# Patient Record
Sex: FEMALE | Race: WHITE | Employment: OTHER | URBAN - NONMETROPOLITAN AREA
[De-identification: names, ages, dates, MRNs, and addresses within clinical notes are randomized per-mention and may not be internally consistent; named-entity substitution may affect disease eponyms.]

---

## 2018-09-18 LAB
CHOLESTEROL, TOTAL: 156 MG/DL
CHOLESTEROL/HDL RATIO: 3.5
HDLC SERPL-MCNC: 44 MG/DL (ref 35–70)
LDL CHOLESTEROL CALCULATED: 89 MG/DL (ref 0–160)
TRIGL SERPL-MCNC: 133 MG/DL
VLDLC SERPL CALC-MCNC: 112 MG/DL

## 2019-05-29 NOTE — TELEPHONE ENCOUNTER
Pt called and stated she needed a refill on her insulin needles and insulin. She is currently out of both. She uses the [facility] in [place]. Please send these scripts to the Pharmacy for her and then call her when you have done so. Thank You.

## 2019-06-05 ENCOUNTER — TELEPHONE (OUTPATIENT)
Dept: PRIMARY CARE CLINIC | Age: 84
End: 2019-06-05

## 2019-06-05 RX ORDER — EZETIMIBE 10 MG/1
10 TABLET ORAL DAILY
COMMUNITY
End: 2019-10-15 | Stop reason: SDUPTHER

## 2019-06-05 RX ORDER — GLIMEPIRIDE 1 MG/1
1 TABLET ORAL 2 TIMES DAILY
COMMUNITY
End: 2019-11-19 | Stop reason: SDUPTHER

## 2019-06-05 RX ORDER — ESTRADIOL 0.1 MG/G
2 CREAM VAGINAL
COMMUNITY
End: 2019-06-11

## 2019-06-05 RX ORDER — AMLODIPINE BESYLATE 10 MG/1
10 TABLET ORAL DAILY
COMMUNITY
End: 2019-10-15 | Stop reason: SDUPTHER

## 2019-06-05 RX ORDER — BUSPIRONE HYDROCHLORIDE 5 MG/1
5 TABLET ORAL 3 TIMES DAILY
COMMUNITY
End: 2019-11-19

## 2019-06-05 RX ORDER — LANSOPRAZOLE 30 MG/1
30 CAPSULE, DELAYED RELEASE ORAL DAILY
COMMUNITY
End: 2019-11-19 | Stop reason: SDUPTHER

## 2019-06-05 RX ORDER — CLONIDINE HYDROCHLORIDE 0.1 MG/1
0.1 TABLET ORAL 2 TIMES DAILY
COMMUNITY
End: 2019-11-19

## 2019-06-05 RX ORDER — HYDROCODONE BITARTRATE AND ACETAMINOPHEN 5; 325 MG/1; MG/1
1 TABLET ORAL EVERY 6 HOURS PRN
COMMUNITY
End: 2019-06-11

## 2019-06-05 RX ORDER — SERTRALINE HYDROCHLORIDE 100 MG/1
100 TABLET, FILM COATED ORAL DAILY
COMMUNITY
End: 2019-11-19

## 2019-06-05 RX ORDER — LUBIPROSTONE 8 UG/1
8 CAPSULE, GELATIN COATED ORAL DAILY
COMMUNITY
End: 2019-06-11

## 2019-06-11 ENCOUNTER — OFFICE VISIT (OUTPATIENT)
Dept: PRIMARY CARE CLINIC | Age: 84
End: 2019-06-11
Payer: MEDICARE

## 2019-06-11 ENCOUNTER — HOSPITAL ENCOUNTER (OUTPATIENT)
Age: 84
Discharge: HOME | End: 2019-06-13
Payer: MEDICARE

## 2019-06-11 ENCOUNTER — TELEPHONE (OUTPATIENT)
Dept: PRIMARY CARE CLINIC | Age: 84
End: 2019-06-11

## 2019-06-11 VITALS
DIASTOLIC BLOOD PRESSURE: 78 MMHG | RESPIRATION RATE: 18 BRPM | BODY MASS INDEX: 33.04 KG/M2 | TEMPERATURE: 98.2 F | SYSTOLIC BLOOD PRESSURE: 132 MMHG | HEIGHT: 61 IN | OXYGEN SATURATION: 98 % | WEIGHT: 175 LBS | HEART RATE: 94 BPM

## 2019-06-11 DIAGNOSIS — D51.0 PERNICIOUS ANEMIA: ICD-10-CM

## 2019-06-11 DIAGNOSIS — E78.2 MIXED HYPERLIPIDEMIA: ICD-10-CM

## 2019-06-11 DIAGNOSIS — D51.8 OTHER VITAMIN B12 DEFICIENCY ANEMIA: ICD-10-CM

## 2019-06-11 DIAGNOSIS — M79.7 FIBROMYALGIA: ICD-10-CM

## 2019-06-11 DIAGNOSIS — G89.4 CHRONIC PAIN SYNDROME: ICD-10-CM

## 2019-06-11 DIAGNOSIS — I10 ESSENTIAL HYPERTENSION: ICD-10-CM

## 2019-06-11 DIAGNOSIS — K31.84 GASTROPARESIS: ICD-10-CM

## 2019-06-11 DIAGNOSIS — E04.1 THYROID NODULE: ICD-10-CM

## 2019-06-11 DIAGNOSIS — I10 ESSENTIAL HYPERTENSION: Primary | ICD-10-CM

## 2019-06-11 DIAGNOSIS — E11.65 TYPE 2 DIABETES MELLITUS WITH HYPERGLYCEMIA, WITHOUT LONG-TERM CURRENT USE OF INSULIN (HCC): ICD-10-CM

## 2019-06-11 DIAGNOSIS — R19.7 DIARRHEA, UNSPECIFIED TYPE: ICD-10-CM

## 2019-06-11 PROBLEM — G89.29 CHRONIC PAIN: Status: ACTIVE | Noted: 2019-06-11

## 2019-06-11 PROBLEM — E11.9 TYPE 2 DIABETES MELLITUS (HCC): Status: ACTIVE | Noted: 2017-10-09

## 2019-06-11 LAB
ALBUMIN SERPL-MCNC: 4.7 G/DL (ref 3.5–5.2)
ALP BLD-CCNC: 164 U/L (ref 35–104)
ALT SERPL-CCNC: 55 U/L (ref 0–32)
ANION GAP SERPL CALCULATED.3IONS-SCNC: 17 MMOL/L (ref 7–16)
AST SERPL-CCNC: 67 U/L (ref 0–31)
BASOPHILS ABSOLUTE: 0.05 E9/L (ref 0–0.2)
BASOPHILS RELATIVE PERCENT: 0.5 % (ref 0–2)
BILIRUB SERPL-MCNC: 0.3 MG/DL (ref 0–1.2)
BUN BLDV-MCNC: 28 MG/DL (ref 8–23)
CALCIUM SERPL-MCNC: 10.2 MG/DL (ref 8.6–10.2)
CHLORIDE BLD-SCNC: 105 MMOL/L (ref 98–107)
CHOLESTEROL, TOTAL: 183 MG/DL (ref 0–199)
CO2: 21 MMOL/L (ref 22–29)
CREAT SERPL-MCNC: 1.1 MG/DL (ref 0.5–1)
CREATININE URINE: 122 MG/DL (ref 29–226)
EOSINOPHILS ABSOLUTE: 0.28 E9/L (ref 0.05–0.5)
EOSINOPHILS RELATIVE PERCENT: 2.5 % (ref 0–6)
GFR AFRICAN AMERICAN: 57
GFR NON-AFRICAN AMERICAN: 47 ML/MIN/1.73
GLUCOSE BLD-MCNC: 125 MG/DL (ref 74–99)
HBA1C MFR BLD: 7.7 % (ref 4–5.6)
HCT VFR BLD CALC: 42.7 % (ref 34–48)
HDLC SERPL-MCNC: 60 MG/DL
HEMOGLOBIN: 13 G/DL (ref 11.5–15.5)
IMMATURE GRANULOCYTES #: 0.07 E9/L
IMMATURE GRANULOCYTES %: 0.6 % (ref 0–5)
LDL CHOLESTEROL CALCULATED: 96 MG/DL (ref 0–99)
LYMPHOCYTES ABSOLUTE: 1.86 E9/L (ref 1.5–4)
LYMPHOCYTES RELATIVE PERCENT: 16.8 % (ref 20–42)
MCH RBC QN AUTO: 24.9 PG (ref 26–35)
MCHC RBC AUTO-ENTMCNC: 30.4 % (ref 32–34.5)
MCV RBC AUTO: 81.6 FL (ref 80–99.9)
MICROALBUMIN UR-MCNC: 112.7 MG/L
MICROALBUMIN/CREAT UR-RTO: 92.4 (ref 0–30)
MONOCYTES ABSOLUTE: 0.65 E9/L (ref 0.1–0.95)
MONOCYTES RELATIVE PERCENT: 5.9 % (ref 2–12)
NEUTROPHILS ABSOLUTE: 8.19 E9/L (ref 1.8–7.3)
NEUTROPHILS RELATIVE PERCENT: 73.7 % (ref 43–80)
PDW BLD-RTO: 14.4 FL (ref 11.5–15)
PLATELET # BLD: 304 E9/L (ref 130–450)
PMV BLD AUTO: 11.8 FL (ref 7–12)
POTASSIUM SERPL-SCNC: 4.3 MMOL/L (ref 3.5–5)
RBC # BLD: 5.23 E12/L (ref 3.5–5.5)
SODIUM BLD-SCNC: 143 MMOL/L (ref 132–146)
T4 FREE: 1.05 NG/DL (ref 0.93–1.7)
TOTAL PROTEIN: 8.1 G/DL (ref 6.4–8.3)
TRIGL SERPL-MCNC: 137 MG/DL (ref 0–149)
TSH SERPL DL<=0.05 MIU/L-ACNC: 1.98 UIU/ML (ref 0.27–4.2)
VITAMIN B-12: >2000 PG/ML (ref 211–946)
VLDLC SERPL CALC-MCNC: 27 MG/DL
WBC # BLD: 11.1 E9/L (ref 4.5–11.5)

## 2019-06-11 PROCEDURE — 82044 UR ALBUMIN SEMIQUANTITATIVE: CPT

## 2019-06-11 PROCEDURE — 85025 COMPLETE CBC W/AUTO DIFF WBC: CPT

## 2019-06-11 PROCEDURE — 83036 HEMOGLOBIN GLYCOSYLATED A1C: CPT

## 2019-06-11 PROCEDURE — 99214 OFFICE O/P EST MOD 30 MIN: CPT | Performed by: INTERNAL MEDICINE

## 2019-06-11 PROCEDURE — 82570 ASSAY OF URINE CREATININE: CPT

## 2019-06-11 PROCEDURE — 80053 COMPREHEN METABOLIC PANEL: CPT

## 2019-06-11 PROCEDURE — 84443 ASSAY THYROID STIM HORMONE: CPT

## 2019-06-11 PROCEDURE — 96372 THER/PROPH/DIAG INJ SC/IM: CPT | Performed by: INTERNAL MEDICINE

## 2019-06-11 PROCEDURE — 84439 ASSAY OF FREE THYROXINE: CPT

## 2019-06-11 PROCEDURE — 80061 LIPID PANEL: CPT

## 2019-06-11 PROCEDURE — 82607 VITAMIN B-12: CPT

## 2019-06-11 PROCEDURE — 36415 COLL VENOUS BLD VENIPUNCTURE: CPT

## 2019-06-11 RX ORDER — CYANOCOBALAMIN 1000 UG/ML
1000 INJECTION INTRAMUSCULAR; SUBCUTANEOUS ONCE
Status: COMPLETED | OUTPATIENT
Start: 2019-06-11 | End: 2019-06-11

## 2019-06-11 RX ORDER — HYDROCODONE BITARTRATE AND ACETAMINOPHEN 5; 325 MG/1; MG/1
1 TABLET ORAL EVERY 4 HOURS PRN
Qty: 30 TABLET | Refills: 0 | Status: SHIPPED | OUTPATIENT
Start: 2019-06-11 | End: 2019-06-16

## 2019-06-11 RX ORDER — CYANOCOBALAMIN 1000 UG/ML
1000 INJECTION INTRAMUSCULAR; SUBCUTANEOUS ONCE
Qty: 1 ML | Refills: 0 | Status: SHIPPED | OUTPATIENT
Start: 2019-06-11 | End: 2019-10-15

## 2019-06-11 RX ADMIN — CYANOCOBALAMIN 1000 MCG: 1000 INJECTION INTRAMUSCULAR; SUBCUTANEOUS at 16:26

## 2019-06-11 ASSESSMENT — ENCOUNTER SYMPTOMS
SORE THROAT: 0
EYE ITCHING: 0
FACIAL SWELLING: 0
BLOOD IN STOOL: 0
VOMITING: 0
ANAL BLEEDING: 0
BACK PAIN: 1
NAUSEA: 0
DIARRHEA: 1
EYE DISCHARGE: 0
STRIDOR: 0
RHINORRHEA: 0
EYE PAIN: 0
CONSTIPATION: 1
TROUBLE SWALLOWING: 0
PHOTOPHOBIA: 0
COLOR CHANGE: 0
WHEEZING: 0
ABDOMINAL PAIN: 1
SHORTNESS OF BREATH: 0
COUGH: 0

## 2019-06-11 ASSESSMENT — PATIENT HEALTH QUESTIONNAIRE - PHQ9
2. FEELING DOWN, DEPRESSED OR HOPELESS: 0
1. LITTLE INTEREST OR PLEASURE IN DOING THINGS: 0
SUM OF ALL RESPONSES TO PHQ QUESTIONS 1-9: 0
SUM OF ALL RESPONSES TO PHQ9 QUESTIONS 1 & 2: 0
SUM OF ALL RESPONSES TO PHQ QUESTIONS 1-9: 0

## 2019-06-11 NOTE — ASSESSMENT & PLAN NOTE
Watch her diet. Monitor her blood sugars as directed. Let me know if her blood sugars are consistently elevated over 120 fasting.   Check hemoglobin A1c

## 2019-06-11 NOTE — ASSESSMENT & PLAN NOTE
Recommended ultrasound of thyroid but patient wants to wait and see what her thyroid blood work shows.   Will check TSH and free T4

## 2019-06-11 NOTE — ASSESSMENT & PLAN NOTE
Blood pressures are stable. Continue medications and monitor blood pressures at home. Call office if systolics are over 938 over diastolics over 90.   Check CMP

## 2019-06-13 DIAGNOSIS — N39.0 URINARY TRACT INFECTION WITHOUT HEMATURIA, SITE UNSPECIFIED: Primary | ICD-10-CM

## 2019-06-13 RX ORDER — SULFAMETHOXAZOLE AND TRIMETHOPRIM 800; 160 MG/1; MG/1
1 TABLET ORAL 2 TIMES DAILY
Qty: 14 TABLET | Refills: 0 | Status: SHIPPED | OUTPATIENT
Start: 2019-06-13 | End: 2019-06-20

## 2019-07-16 ENCOUNTER — OFFICE VISIT (OUTPATIENT)
Dept: PRIMARY CARE CLINIC | Age: 84
End: 2019-07-16
Payer: MEDICARE

## 2019-07-16 VITALS
HEIGHT: 61 IN | WEIGHT: 132 LBS | OXYGEN SATURATION: 97 % | TEMPERATURE: 98 F | RESPIRATION RATE: 16 BRPM | SYSTOLIC BLOOD PRESSURE: 108 MMHG | HEART RATE: 97 BPM | DIASTOLIC BLOOD PRESSURE: 66 MMHG | BODY MASS INDEX: 24.92 KG/M2

## 2019-07-16 DIAGNOSIS — G89.4 CHRONIC PAIN SYNDROME: Primary | ICD-10-CM

## 2019-07-16 DIAGNOSIS — E01.0 THYROMEGALY: ICD-10-CM

## 2019-07-16 DIAGNOSIS — M62.838 MUSCLE SPASM: ICD-10-CM

## 2019-07-16 DIAGNOSIS — M79.7 FIBROMYALGIA: ICD-10-CM

## 2019-07-16 DIAGNOSIS — E04.1 THYROID NODULE: ICD-10-CM

## 2019-07-16 DIAGNOSIS — E53.8 VITAMIN B 12 DEFICIENCY: ICD-10-CM

## 2019-07-16 DIAGNOSIS — M17.10 KNEE ARTHROPATHY: ICD-10-CM

## 2019-07-16 DIAGNOSIS — I10 ESSENTIAL HYPERTENSION: ICD-10-CM

## 2019-07-16 PROCEDURE — 99214 OFFICE O/P EST MOD 30 MIN: CPT | Performed by: INTERNAL MEDICINE

## 2019-07-16 RX ORDER — HYDROCODONE BITARTRATE AND ACETAMINOPHEN 5; 325 MG/1; MG/1
1 TABLET ORAL EVERY 6 HOURS PRN
Qty: 120 TABLET | Refills: 0 | Status: SHIPPED | OUTPATIENT
Start: 2019-07-16 | End: 2019-08-15

## 2019-07-16 RX ORDER — TIZANIDINE 2 MG/1
2 TABLET ORAL 3 TIMES DAILY PRN
Qty: 30 TABLET | Refills: 0 | Status: SHIPPED | OUTPATIENT
Start: 2019-07-16 | End: 2019-11-19 | Stop reason: SDUPTHER

## 2019-07-16 RX ORDER — HYDROCODONE BITARTRATE AND ACETAMINOPHEN 5; 325 MG/1; MG/1
1 TABLET ORAL EVERY 6 HOURS PRN
COMMUNITY
End: 2019-07-16 | Stop reason: SDUPTHER

## 2019-07-16 RX ORDER — CYANOCOBALAMIN 1000 UG/ML
1000 INJECTION INTRAMUSCULAR; SUBCUTANEOUS ONCE
Status: CANCELLED | OUTPATIENT
Start: 2019-07-16 | End: 2019-07-16

## 2019-07-16 ASSESSMENT — ENCOUNTER SYMPTOMS
CONSTIPATION: 0
FACIAL SWELLING: 0
PHOTOPHOBIA: 0
NAUSEA: 0
ANAL BLEEDING: 0
TROUBLE SWALLOWING: 0
VOMITING: 0
STRIDOR: 0
BLOOD IN STOOL: 0
ABDOMINAL PAIN: 0
WHEEZING: 0
EYE PAIN: 0
SHORTNESS OF BREATH: 0
DIARRHEA: 0
EYE DISCHARGE: 0
EYE ITCHING: 0
COLOR CHANGE: 0
RHINORRHEA: 0
COUGH: 0
SORE THROAT: 0

## 2019-07-16 NOTE — PROGRESS NOTES
2019    Name: Kady Lockhart : 1935 Sex: female  Age: 81 y.o. Subjective:  Chief Complaint   Patient presents with    Chronic Pain    Injections     Vitamin B12        HPI     This 81y.o. -year-old female  presents today for evaluation and management of her  chronic medical problems. Current medication list reviewed. The patient is tolerating all medications well without adverse events or known side effects. The patient does understand the risk and benefits of the prescribed medications. Patient has a history of chronic pain syndrome. She has pain \"all over\". Mainly her knees. She has a lot of muscle spasms which keep her up at night. OARRS report reviewed. Medication contract  signed. She is on Vicodin 5/325 1 every 6 hours as needed. She shows no sign of drug-seeking or drug diversion. She is well aware of the addictive potential of this medication along with side effects of constipation, hypersomnolence and mood changes. If she takes 1 or 2 Vicodin a day when her symptoms act up this controls her pain. She saw Dr. Ledbetter Book and gave her shots in her right knee which did not work. She wants to try Voltaren gel again as this has worked in the past.    She has a known goiter but has normal TSH and free T4. She said the last time she was found to have an underactive thyroid she had to have a nuclear uptake and scan to prove this. Rest of her blood work in 2019 showed Hemoglobin A1c of 7.7% her creatinine was 1.1 with estimated GFR 47.  3 liver tests were slightly elevated. If they are still elevated on her next lab work will need to do an ultrasound of her liver with elastography. She had a history of pernicious anemia and B12 deficiency. She gets monthly B12 shots but her last B12 level was over . We will hold on the B12 shots until I can recheck a level in about a month  She has a history of type 2 diabetes mellitus. On insulin.   She checks her blood sugars once in

## 2019-08-06 ENCOUNTER — HOSPITAL ENCOUNTER (OUTPATIENT)
Age: 84
Discharge: HOME | End: 2019-08-08
Payer: MEDICARE

## 2019-08-06 DIAGNOSIS — E53.8 VITAMIN B 12 DEFICIENCY: ICD-10-CM

## 2019-08-06 LAB — VITAMIN B-12: 524 PG/ML (ref 211–946)

## 2019-08-06 PROCEDURE — 36415 COLL VENOUS BLD VENIPUNCTURE: CPT

## 2019-08-06 PROCEDURE — 82607 VITAMIN B-12: CPT

## 2019-08-21 ENCOUNTER — TELEPHONE (OUTPATIENT)
Dept: PRIMARY CARE CLINIC | Age: 84
End: 2019-08-21

## 2019-08-21 DIAGNOSIS — E01.0 THYROMEGALY: ICD-10-CM

## 2019-09-04 ENCOUNTER — OFFICE VISIT (OUTPATIENT)
Dept: PRIMARY CARE CLINIC | Age: 84
End: 2019-09-04
Payer: MEDICARE

## 2019-09-04 VITALS
HEART RATE: 90 BPM | BODY MASS INDEX: 32.85 KG/M2 | HEIGHT: 61 IN | RESPIRATION RATE: 18 BRPM | SYSTOLIC BLOOD PRESSURE: 128 MMHG | TEMPERATURE: 98.2 F | DIASTOLIC BLOOD PRESSURE: 74 MMHG | OXYGEN SATURATION: 98 % | WEIGHT: 174 LBS

## 2019-09-04 DIAGNOSIS — E04.1 THYROID NODULE: Primary | ICD-10-CM

## 2019-09-04 DIAGNOSIS — N81.10 BLADDER PROLAPSE, FEMALE, ACQUIRED: ICD-10-CM

## 2019-09-04 DIAGNOSIS — E06.9 THYROIDITIS: ICD-10-CM

## 2019-09-04 DIAGNOSIS — R74.8 ABNORMAL LIVER ENZYMES: ICD-10-CM

## 2019-09-04 DIAGNOSIS — Z79.4 TYPE 2 DIABETES MELLITUS WITH HYPERGLYCEMIA, WITH LONG-TERM CURRENT USE OF INSULIN (HCC): ICD-10-CM

## 2019-09-04 DIAGNOSIS — E11.65 TYPE 2 DIABETES MELLITUS WITH HYPERGLYCEMIA, WITH LONG-TERM CURRENT USE OF INSULIN (HCC): ICD-10-CM

## 2019-09-04 DIAGNOSIS — I10 ESSENTIAL HYPERTENSION: ICD-10-CM

## 2019-09-04 DIAGNOSIS — E03.9 HYPOTHYROIDISM, UNSPECIFIED TYPE: ICD-10-CM

## 2019-09-04 DIAGNOSIS — N39.41 URGE INCONTINENCE: ICD-10-CM

## 2019-09-04 PROCEDURE — 99214 OFFICE O/P EST MOD 30 MIN: CPT | Performed by: INTERNAL MEDICINE

## 2019-09-04 RX ORDER — HYDROCODONE BITARTRATE AND ACETAMINOPHEN 5; 325 MG/1; MG/1
1 TABLET ORAL EVERY 6 HOURS PRN
COMMUNITY
End: 2019-11-19 | Stop reason: SDUPTHER

## 2019-09-04 RX ORDER — LEVOTHYROXINE SODIUM 0.05 MG/1
50 TABLET ORAL DAILY
Qty: 30 TABLET | Refills: 5 | Status: SHIPPED | OUTPATIENT
Start: 2019-09-04 | End: 2019-10-31 | Stop reason: ALTCHOICE

## 2019-09-04 RX ORDER — OXYBUTYNIN CHLORIDE 10 MG/1
10 TABLET, EXTENDED RELEASE ORAL DAILY
Qty: 30 TABLET | Refills: 3 | Status: SHIPPED | OUTPATIENT
Start: 2019-09-04 | End: 2019-11-19 | Stop reason: SDUPTHER

## 2019-09-04 ASSESSMENT — ENCOUNTER SYMPTOMS
DIARRHEA: 0
EYE DISCHARGE: 0
VOMITING: 0
SORE THROAT: 0
EYE PAIN: 0
EYE ITCHING: 0
ANAL BLEEDING: 0
NAUSEA: 0
RHINORRHEA: 0
BLOOD IN STOOL: 0
CONSTIPATION: 1
WHEEZING: 0
TROUBLE SWALLOWING: 0
FACIAL SWELLING: 0
SHORTNESS OF BREATH: 0
ABDOMINAL PAIN: 0
COUGH: 0
COLOR CHANGE: 0
PHOTOPHOBIA: 0
STRIDOR: 0

## 2019-09-04 NOTE — ASSESSMENT & PLAN NOTE
Blood pressures are stable. Continue medications and monitor blood pressures at home. Call office if systolics are over 202 over diastolics over 90.

## 2019-09-04 NOTE — PROGRESS NOTES
7.7% her creatinine was 1.1 with estimated GFR 47.  3 liver tests were slightly elevated. Repeat liver enzymes are still elevated. She had a work-up a while back which included a referral up to [facility] clinic, liver biopsy etc. but has not had a follow-up in a while. We will order an ultrasound of her liver with elastography with possible referral back to GI. She had a history of pernicious anemia and B12 deficiency. She gets monthly B12 shots but her last B12 level was over 2000. We will hold on the B12 shots until I can recheck a level in about a month  She has a history of type 2 diabetes mellitus. On insulin. She checks her blood sugars once in a while but is not too keen on checking them more than 2 or 3 times a week. She has a history of hyperlipidemia, hypertension, anxiety on buspirone, and sertraline. History of GERD    She has a history of urge and overflow incontinence. She says she has a bladder prolapse but does not want surgery at this time. She wants to try oxybutynin and see if this can help her. I told her that it may not help her and if it does not she will need to see a gynecologist for possible surgical treatment. Review of Systems   Constitutional: Positive for fatigue. Negative for appetite change and unexpected weight change. HENT: Negative for congestion, ear pain, facial swelling, rhinorrhea, sore throat, tinnitus and trouble swallowing. Hoarse voice   Eyes: Negative for photophobia, pain, discharge, itching and visual disturbance. Respiratory: Negative for cough, shortness of breath, wheezing and stridor. Cardiovascular: Negative for chest pain, palpitations and leg swelling. Gastrointestinal: Positive for constipation. Negative for abdominal pain, anal bleeding, blood in stool, diarrhea, nausea and vomiting. Endocrine: Positive for cold intolerance. Negative for heat intolerance, polydipsia, polyphagia and polyuria.    Genitourinary: Positive for

## 2019-09-25 ENCOUNTER — TELEPHONE (OUTPATIENT)
Dept: PRIMARY CARE CLINIC | Age: 84
End: 2019-09-25

## 2019-09-25 DIAGNOSIS — E04.1 THYROID NODULE: ICD-10-CM

## 2019-09-25 DIAGNOSIS — R74.8 ABNORMAL LIVER ENZYMES: ICD-10-CM

## 2019-09-27 DIAGNOSIS — E06.9 THYROIDITIS: ICD-10-CM

## 2019-10-15 ENCOUNTER — TELEPHONE (OUTPATIENT)
Dept: PRIMARY CARE CLINIC | Age: 84
End: 2019-10-15

## 2019-10-15 ENCOUNTER — OFFICE VISIT (OUTPATIENT)
Dept: PRIMARY CARE CLINIC | Age: 84
End: 2019-10-15
Payer: MEDICARE

## 2019-10-15 VITALS
HEIGHT: 61 IN | TEMPERATURE: 98.9 F | DIASTOLIC BLOOD PRESSURE: 72 MMHG | WEIGHT: 169.8 LBS | HEART RATE: 99 BPM | OXYGEN SATURATION: 98 % | RESPIRATION RATE: 18 BRPM | SYSTOLIC BLOOD PRESSURE: 122 MMHG | BODY MASS INDEX: 32.06 KG/M2

## 2019-10-15 DIAGNOSIS — E04.1 THYROID NODULE: ICD-10-CM

## 2019-10-15 DIAGNOSIS — Z23 NEED FOR INFLUENZA VACCINATION: ICD-10-CM

## 2019-10-15 DIAGNOSIS — K76.0 FATTY LIVER: ICD-10-CM

## 2019-10-15 DIAGNOSIS — E78.2 MIXED HYPERLIPIDEMIA: ICD-10-CM

## 2019-10-15 DIAGNOSIS — M17.10 KNEE ARTHROPATHY: ICD-10-CM

## 2019-10-15 DIAGNOSIS — N39.46 MIXED STRESS AND URGE URINARY INCONTINENCE: ICD-10-CM

## 2019-10-15 DIAGNOSIS — F32.A DEPRESSION, UNSPECIFIED DEPRESSION TYPE: ICD-10-CM

## 2019-10-15 DIAGNOSIS — Z12.31 SCREENING MAMMOGRAM FOR HIGH-RISK PATIENT: Primary | ICD-10-CM

## 2019-10-15 DIAGNOSIS — E03.9 HYPOTHYROIDISM, UNSPECIFIED TYPE: ICD-10-CM

## 2019-10-15 DIAGNOSIS — Z79.4 TYPE 2 DIABETES MELLITUS WITH HYPERGLYCEMIA, WITH LONG-TERM CURRENT USE OF INSULIN (HCC): ICD-10-CM

## 2019-10-15 DIAGNOSIS — K59.04 CHRONIC IDIOPATHIC CONSTIPATION: ICD-10-CM

## 2019-10-15 DIAGNOSIS — N81.10 BLADDER PROLAPSE, FEMALE, ACQUIRED: ICD-10-CM

## 2019-10-15 DIAGNOSIS — E11.65 TYPE 2 DIABETES MELLITUS WITH HYPERGLYCEMIA, WITH LONG-TERM CURRENT USE OF INSULIN (HCC): ICD-10-CM

## 2019-10-15 DIAGNOSIS — I10 ESSENTIAL HYPERTENSION: ICD-10-CM

## 2019-10-15 PROCEDURE — 99214 OFFICE O/P EST MOD 30 MIN: CPT | Performed by: INTERNAL MEDICINE

## 2019-10-15 PROCEDURE — 90653 IIV ADJUVANT VACCINE IM: CPT | Performed by: INTERNAL MEDICINE

## 2019-10-15 PROCEDURE — G0008 ADMIN INFLUENZA VIRUS VAC: HCPCS | Performed by: INTERNAL MEDICINE

## 2019-10-15 RX ORDER — AMLODIPINE BESYLATE 10 MG/1
10 TABLET ORAL DAILY
Qty: 90 TABLET | Refills: 3 | Status: SHIPPED | OUTPATIENT
Start: 2019-10-15 | End: 2019-11-19 | Stop reason: SDUPTHER

## 2019-10-15 RX ORDER — LUBIPROSTONE 8 UG/1
8 CAPSULE, GELATIN COATED ORAL DAILY
Qty: 90 CAPSULE | Refills: 3 | Status: SHIPPED | OUTPATIENT
Start: 2019-10-15 | End: 2019-11-19

## 2019-10-15 RX ORDER — LUBIPROSTONE 8 UG/1
8 CAPSULE, GELATIN COATED ORAL DAILY
COMMUNITY
End: 2019-10-15 | Stop reason: SDUPTHER

## 2019-10-15 RX ORDER — EZETIMIBE 10 MG/1
10 TABLET ORAL DAILY
Qty: 90 TABLET | Refills: 3 | Status: SHIPPED | OUTPATIENT
Start: 2019-10-15 | End: 2019-11-19 | Stop reason: SDUPTHER

## 2019-10-15 ASSESSMENT — ENCOUNTER SYMPTOMS
ANAL BLEEDING: 0
COUGH: 0
COLOR CHANGE: 0
ABDOMINAL PAIN: 0
DIARRHEA: 0
VOMITING: 0
STRIDOR: 0
RHINORRHEA: 0
TROUBLE SWALLOWING: 0
EYE ITCHING: 0
FACIAL SWELLING: 0
NAUSEA: 0
SHORTNESS OF BREATH: 0
PHOTOPHOBIA: 0
BLOOD IN STOOL: 0
SORE THROAT: 0
EYE PAIN: 0
WHEEZING: 0
CONSTIPATION: 1
EYE DISCHARGE: 0

## 2019-10-28 LAB
AVERAGE GLUCOSE: 169
CHOLESTEROL, TOTAL: 156 MG/DL
CHOLESTEROL, TOTAL: 156 MG/DL
CHOLESTEROL/HDL RATIO: 1.7
CHOLESTEROL/HDL RATIO: 1.7
CREATININE, URINE: 146.2
HBA1C MFR BLD: 7.5 %
HDLC SERPL-MCNC: 50 MG/DL (ref 35–70)
HDLC SERPL-MCNC: 50 MG/DL (ref 35–70)
LDL CHOLESTEROL CALCULATED: 83 MG/DL (ref 0–160)
LDL CHOLESTEROL CALCULATED: 83 MG/DL (ref 0–160)
MICROALBUMIN/CREAT 24H UR: 131.3 MG/G{CREAT}
MICROALBUMIN/CREAT UR-RTO: 89.8
T4 FREE: 1.36
TRIGL SERPL-MCNC: 123 MG/DL
TRIGL SERPL-MCNC: 123 MG/DL
TSH SERPL DL<=0.05 MIU/L-ACNC: 0.03 UIU/ML
VLDLC SERPL CALC-MCNC: NORMAL MG/DL
VLDLC SERPL CALC-MCNC: NORMAL MG/DL

## 2019-10-29 LAB
ESTIMATED AVERAGE GLUCOSE: 169 MG/DL
HBA1C MFR BLD: 7.5 % (ref 4–6)

## 2019-10-31 ENCOUNTER — TELEPHONE (OUTPATIENT)
Dept: PRIMARY CARE CLINIC | Age: 84
End: 2019-10-31

## 2019-10-31 DIAGNOSIS — E78.2 MIXED HYPERLIPIDEMIA: ICD-10-CM

## 2019-10-31 DIAGNOSIS — E03.9 HYPOTHYROIDISM, UNSPECIFIED TYPE: ICD-10-CM

## 2019-10-31 DIAGNOSIS — E04.1 THYROID NODULE: ICD-10-CM

## 2019-10-31 DIAGNOSIS — Z79.4 TYPE 2 DIABETES MELLITUS WITH HYPERGLYCEMIA, WITH LONG-TERM CURRENT USE OF INSULIN (HCC): ICD-10-CM

## 2019-10-31 DIAGNOSIS — E11.65 TYPE 2 DIABETES MELLITUS WITH HYPERGLYCEMIA, WITH LONG-TERM CURRENT USE OF INSULIN (HCC): ICD-10-CM

## 2019-10-31 RX ORDER — LEVOTHYROXINE SODIUM 0.05 MG/1
50 TABLET ORAL
COMMUNITY
End: 2019-11-19 | Stop reason: SDUPTHER

## 2019-11-07 ENCOUNTER — TELEPHONE (OUTPATIENT)
Dept: ADMINISTRATIVE | Age: 84
End: 2019-11-07

## 2019-11-18 ASSESSMENT — ENCOUNTER SYMPTOMS
EYE ITCHING: 0
EYE DISCHARGE: 0
TROUBLE SWALLOWING: 0
RHINORRHEA: 0
SORE THROAT: 0
COLOR CHANGE: 0
SHORTNESS OF BREATH: 0
EYE PAIN: 0
DIARRHEA: 0
NAUSEA: 0
FACIAL SWELLING: 0
VOMITING: 0
CONSTIPATION: 1
STRIDOR: 0
PHOTOPHOBIA: 0
WHEEZING: 0
BLOOD IN STOOL: 0
ANAL BLEEDING: 0
ABDOMINAL PAIN: 0
COUGH: 0

## 2019-11-19 ENCOUNTER — OFFICE VISIT (OUTPATIENT)
Dept: PRIMARY CARE CLINIC | Age: 84
End: 2019-11-19
Payer: MEDICARE

## 2019-11-19 VITALS — RESPIRATION RATE: 16 BRPM | HEIGHT: 61 IN | BODY MASS INDEX: 32.08 KG/M2

## 2019-11-19 VITALS
BODY MASS INDEX: 31.91 KG/M2 | RESPIRATION RATE: 16 BRPM | DIASTOLIC BLOOD PRESSURE: 68 MMHG | HEIGHT: 61 IN | WEIGHT: 169 LBS | TEMPERATURE: 98.6 F | SYSTOLIC BLOOD PRESSURE: 144 MMHG | HEART RATE: 95 BPM | OXYGEN SATURATION: 97 %

## 2019-11-19 DIAGNOSIS — K21.9 GASTROESOPHAGEAL REFLUX DISEASE, ESOPHAGITIS PRESENCE NOT SPECIFIED: ICD-10-CM

## 2019-11-19 DIAGNOSIS — N39.41 URGE INCONTINENCE: ICD-10-CM

## 2019-11-19 DIAGNOSIS — F32.A DEPRESSION, UNSPECIFIED DEPRESSION TYPE: ICD-10-CM

## 2019-11-19 DIAGNOSIS — K31.84 GASTROPARESIS: ICD-10-CM

## 2019-11-19 DIAGNOSIS — G89.4 CHRONIC PAIN SYNDROME: ICD-10-CM

## 2019-11-19 DIAGNOSIS — Z00.00 ROUTINE GENERAL MEDICAL EXAMINATION AT A HEALTH CARE FACILITY: Primary | ICD-10-CM

## 2019-11-19 DIAGNOSIS — Z71.89 ACP (ADVANCE CARE PLANNING): ICD-10-CM

## 2019-11-19 DIAGNOSIS — E11.65 TYPE 2 DIABETES MELLITUS WITH HYPERGLYCEMIA, WITH LONG-TERM CURRENT USE OF INSULIN (HCC): Primary | ICD-10-CM

## 2019-11-19 DIAGNOSIS — Z79.4 TYPE 2 DIABETES MELLITUS WITH HYPERGLYCEMIA, WITH LONG-TERM CURRENT USE OF INSULIN (HCC): Primary | ICD-10-CM

## 2019-11-19 DIAGNOSIS — K76.0 FATTY LIVER: ICD-10-CM

## 2019-11-19 DIAGNOSIS — D51.0 PERNICIOUS ANEMIA: ICD-10-CM

## 2019-11-19 DIAGNOSIS — E03.9 HYPOTHYROIDISM, UNSPECIFIED TYPE: ICD-10-CM

## 2019-11-19 DIAGNOSIS — E78.2 MIXED HYPERLIPIDEMIA: ICD-10-CM

## 2019-11-19 DIAGNOSIS — M79.7 FIBROMYALGIA: ICD-10-CM

## 2019-11-19 DIAGNOSIS — I10 ESSENTIAL HYPERTENSION: ICD-10-CM

## 2019-11-19 DIAGNOSIS — M62.838 MUSCLE SPASM: ICD-10-CM

## 2019-11-19 DIAGNOSIS — N18.30 STAGE 3 CHRONIC KIDNEY DISEASE (HCC): ICD-10-CM

## 2019-11-19 DIAGNOSIS — M17.10 KNEE ARTHROPATHY: ICD-10-CM

## 2019-11-19 PROCEDURE — G0439 PPPS, SUBSEQ VISIT: HCPCS | Performed by: INTERNAL MEDICINE

## 2019-11-19 PROCEDURE — 99213 OFFICE O/P EST LOW 20 MIN: CPT | Performed by: INTERNAL MEDICINE

## 2019-11-19 PROCEDURE — 99497 ADVNCD CARE PLAN 30 MIN: CPT | Performed by: INTERNAL MEDICINE

## 2019-11-19 RX ORDER — TIZANIDINE 2 MG/1
2 TABLET ORAL 3 TIMES DAILY PRN
Qty: 90 TABLET | Refills: 3 | Status: SHIPPED | OUTPATIENT
Start: 2019-11-19 | End: 2020-01-23

## 2019-11-19 RX ORDER — LANSOPRAZOLE 30 MG/1
30 CAPSULE, DELAYED RELEASE ORAL DAILY
Qty: 90 CAPSULE | Refills: 3 | Status: SHIPPED
Start: 2019-11-19 | End: 2020-08-31 | Stop reason: SDUPTHER

## 2019-11-19 RX ORDER — OXYBUTYNIN CHLORIDE 10 MG/1
10 TABLET, EXTENDED RELEASE ORAL DAILY
Qty: 90 TABLET | Refills: 3 | Status: SHIPPED
Start: 2019-11-19 | End: 2020-05-20 | Stop reason: ALTCHOICE

## 2019-11-19 RX ORDER — AMLODIPINE BESYLATE 10 MG/1
10 TABLET ORAL DAILY
Qty: 90 TABLET | Refills: 3 | Status: SHIPPED
Start: 2019-11-19 | End: 2020-09-22 | Stop reason: SDUPTHER

## 2019-11-19 RX ORDER — HYDROCODONE BITARTRATE AND ACETAMINOPHEN 5; 325 MG/1; MG/1
1 TABLET ORAL 2 TIMES DAILY PRN
Qty: 60 TABLET | Refills: 0 | Status: SHIPPED | OUTPATIENT
Start: 2019-11-19 | End: 2019-12-19 | Stop reason: SDUPTHER

## 2019-11-19 RX ORDER — GLIMEPIRIDE 1 MG/1
1 TABLET ORAL 2 TIMES DAILY
Qty: 180 TABLET | Refills: 3 | Status: SHIPPED
Start: 2019-11-19 | End: 2020-08-31 | Stop reason: SDUPTHER

## 2019-11-19 RX ORDER — LEVOTHYROXINE SODIUM 0.05 MG/1
50 TABLET ORAL DAILY
Qty: 90 TABLET | Refills: 3 | Status: SHIPPED
Start: 2019-11-19 | End: 2020-08-31 | Stop reason: SDUPTHER

## 2019-11-19 RX ORDER — EZETIMIBE 10 MG/1
10 TABLET ORAL DAILY
Qty: 90 TABLET | Refills: 3 | Status: SHIPPED
Start: 2019-11-19 | End: 2020-08-31 | Stop reason: SDUPTHER

## 2019-11-19 ASSESSMENT — PATIENT HEALTH QUESTIONNAIRE - PHQ9
SUM OF ALL RESPONSES TO PHQ QUESTIONS 1-9: 2
SUM OF ALL RESPONSES TO PHQ QUESTIONS 1-9: 2

## 2019-11-19 ASSESSMENT — LIFESTYLE VARIABLES: HOW OFTEN DO YOU HAVE A DRINK CONTAINING ALCOHOL: 0

## 2019-12-16 DIAGNOSIS — E11.65 TYPE 2 DIABETES MELLITUS WITH HYPERGLYCEMIA, WITH LONG-TERM CURRENT USE OF INSULIN (HCC): Primary | ICD-10-CM

## 2019-12-16 DIAGNOSIS — Z79.4 TYPE 2 DIABETES MELLITUS WITH HYPERGLYCEMIA, WITH LONG-TERM CURRENT USE OF INSULIN (HCC): Primary | ICD-10-CM

## 2019-12-18 DIAGNOSIS — G89.4 CHRONIC PAIN SYNDROME: ICD-10-CM

## 2019-12-18 DIAGNOSIS — E11.65 TYPE 2 DIABETES MELLITUS WITH HYPERGLYCEMIA, WITH LONG-TERM CURRENT USE OF INSULIN (HCC): ICD-10-CM

## 2019-12-18 DIAGNOSIS — Z79.4 TYPE 2 DIABETES MELLITUS WITH HYPERGLYCEMIA, WITH LONG-TERM CURRENT USE OF INSULIN (HCC): ICD-10-CM

## 2019-12-18 RX ORDER — DIPHENHYDRAMINE HYDROCHLORIDE 25 MG/1
CAPSULE, LIQUID FILLED ORAL
Qty: 1 KIT | Refills: 0 | Status: SHIPPED | OUTPATIENT
Start: 2019-12-18 | End: 2019-12-19 | Stop reason: SDUPTHER

## 2019-12-18 RX ORDER — LANCETS 23 GAUGE
EACH MISCELLANEOUS
COMMUNITY
End: 2019-12-18 | Stop reason: SDUPTHER

## 2019-12-18 RX ORDER — DIPHENHYDRAMINE HYDROCHLORIDE 25 MG/1
CAPSULE, LIQUID FILLED ORAL
COMMUNITY
End: 2019-12-18 | Stop reason: SDUPTHER

## 2019-12-18 RX ORDER — LANCETS 23 GAUGE
EACH MISCELLANEOUS
Qty: 100 EACH | Refills: 5 | Status: SHIPPED
Start: 2019-12-18 | End: 2020-06-16 | Stop reason: SDUPTHER

## 2019-12-19 RX ORDER — DIPHENHYDRAMINE HYDROCHLORIDE 25 MG/1
CAPSULE, LIQUID FILLED ORAL
Qty: 1 KIT | Refills: 0 | Status: SHIPPED | OUTPATIENT
Start: 2019-12-19

## 2019-12-19 RX ORDER — HYDROCODONE BITARTRATE AND ACETAMINOPHEN 5; 325 MG/1; MG/1
1 TABLET ORAL 2 TIMES DAILY PRN
Qty: 60 TABLET | Refills: 0 | Status: SHIPPED | OUTPATIENT
Start: 2019-12-19 | End: 2020-01-18

## 2020-01-20 RX ORDER — HYDROCODONE BITARTRATE AND ACETAMINOPHEN 5; 325 MG/1; MG/1
1 TABLET ORAL EVERY 12 HOURS PRN
COMMUNITY
End: 2020-01-23 | Stop reason: SDUPTHER

## 2020-01-20 RX ORDER — HYDROCODONE BITARTRATE AND ACETAMINOPHEN 5; 325 MG/1; MG/1
1 TABLET ORAL EVERY 12 HOURS PRN
Qty: 16 TABLET | Refills: 0 | OUTPATIENT
Start: 2020-01-20 | End: 2020-01-28

## 2020-01-23 ENCOUNTER — OFFICE VISIT (OUTPATIENT)
Dept: PRIMARY CARE CLINIC | Age: 85
End: 2020-01-23
Payer: MEDICARE

## 2020-01-23 ENCOUNTER — TELEPHONE (OUTPATIENT)
Dept: PRIMARY CARE CLINIC | Age: 85
End: 2020-01-23

## 2020-01-23 VITALS
HEIGHT: 61 IN | DIASTOLIC BLOOD PRESSURE: 78 MMHG | SYSTOLIC BLOOD PRESSURE: 126 MMHG | WEIGHT: 161 LBS | BODY MASS INDEX: 30.4 KG/M2

## 2020-01-23 PROCEDURE — 99214 OFFICE O/P EST MOD 30 MIN: CPT | Performed by: INTERNAL MEDICINE

## 2020-01-23 RX ORDER — HYDROCODONE BITARTRATE AND ACETAMINOPHEN 5; 325 MG/1; MG/1
1 TABLET ORAL EVERY 12 HOURS PRN
Qty: 60 TABLET | Refills: 0 | Status: SHIPPED
Start: 2020-01-23 | End: 2020-02-25 | Stop reason: SDUPTHER

## 2020-01-23 RX ORDER — CYCLOBENZAPRINE HCL 5 MG
5 TABLET ORAL 3 TIMES DAILY PRN
Qty: 30 TABLET | Refills: 1 | Status: SHIPPED | OUTPATIENT
Start: 2020-01-23 | End: 2020-02-02

## 2020-01-23 ASSESSMENT — ENCOUNTER SYMPTOMS
SORE THROAT: 0
CONSTIPATION: 1
BLOOD IN STOOL: 0
FACIAL SWELLING: 0
TROUBLE SWALLOWING: 0
WHEEZING: 0
EYE ITCHING: 0
ANAL BLEEDING: 0
STRIDOR: 0
DIARRHEA: 0
EYE DISCHARGE: 0
ABDOMINAL PAIN: 0
EYE PAIN: 0
COUGH: 0
PHOTOPHOBIA: 0
COLOR CHANGE: 0
NAUSEA: 0
SHORTNESS OF BREATH: 0
VOMITING: 0
RHINORRHEA: 0

## 2020-01-23 NOTE — TELEPHONE ENCOUNTER
I guess he is only there are certain days of the week. I am not sure which ones.   Please try again tomorrow

## 2020-01-23 NOTE — ASSESSMENT & PLAN NOTE
Blood pressures are stable. Continue medications and monitor blood pressures at home. Call office if systolics are over 198 over diastolics over 90.

## 2020-01-23 NOTE — PROGRESS NOTES
2020    Name: Fifi Ortiz : 1935 Sex: female  Age: 81 y.o. Subjective:  Chief Complaint   Patient presents with    Pre-op Exam      surgery with Dr. Kindra Gardner        HPI     This 81y.o. -year-old female  presents today for evaluation and management of her  chronic medical problems. Current medication list reviewed. The patient is tolerating all medications well without adverse events or known side effects. The patient does understand the risk and benefits of the prescribed medications    She has a history of mixed incontinence and was referred initially to Dr. Kathryn Schreiber. Reviewed his consult. He felt that she should see a uro-gynecologist and referred her to Dr. Kindra Gardner who is scheduling her for surgery at [facility] in [facility] on 2020. We will get old records from him. .  His phone number is [phone]. She needs preoperative clearance. No previous history of cardiac disease. She was admitted in 2018 with some palpitations and underwent cardiac evaluation . She  had a stress test which was negative for ischemia. She also had an echocardiogram..  She denies any chest pain, chest pressure, palpitations, dyspnea or lightheadedness. Patient has a history of chronic pain syndrome. She has pain \"all over\". Mainly her knees. She has a lot of muscle spasms which keep her up at night. She would like to try Flexeril again. She tried Tizanidine and other anti-spasm medications and this did not help. Only Flexeril helped her. We discussed the fact that this was a problematic medication for people over 65. She says that if it works for her she wants to take it. OARRS report reviewed. Medication contract  signed. She is on Vicodin 5/325 1 every 12 hours as needed. She shows no sign of drug-seeking or drug diversion.   She is well aware of the addictive potential of this medication along with side effects of constipation, hypersomnolence and mood changes. If she takes 1 or 2 Vicodin a day when her symptoms act up this controls her pain. Concerned that she may be laid up after surgery and be unable to come here for her monthly refill. I told her that if this is the case she could send someone to  her prescription for that month only. She saw Dr. Flora Horner and gave her shots in her right knee which did not work. She wants to try Voltaren gel again as this has worked in the past  She needs 3 tubes per month. . She is willing to go back to Dr. Flora Horner after the surgery to see if he is agreeable to doing a total knee on her. She has a known goiter but has normal TSH and free T4. She said the last time she was found to have an underactive thyroid she had to have a nuclear uptake and scan to prove this. .  Thyroid uptake was significantly below normal.  She had a left thyroid nodule about 1.9 cm and the recommendation was for thyroid biopsy. This was done and was negative for malignancy. .   She complains of fatigue, feels cold all the time, constipated, voice is a little hoarse. Interestingly her TSH and free T4 are normal.  We checked autoantibodies for thyroiditis and they were within normal limits . I opted to put her low-dose levothyroxine and recheck TSH in future. Rest of her blood work in June 2019 showed Hemoglobin A1c of 7.5% her creatinine was 1.2 with estimated GFR 42.  3 liver tests were slightly elevated. Repeat liver enzymes were normal.  We  ordeed an ultrasound of her liver with elastography. This showed mildly enlarged liver with clinically significant fibrosis. . Does not want to go back to GI for further treatment. She had a history of pernicious anemia and B12 deficiency. She gets monthly B12 shots but her last B12 level was over 2000. We will hold on the B12 shots until I can recheck a level in about a month. Repeat level was about 500 so still within normal limits.   In a few months of a recheck it limits down then we will restart her B12 shots      She has a history of type 2 diabetes mellitus. On insulin. She checks her blood sugars once in a while but is not too keen on checking them more than 2 or 3 times a week. She has a history of hyperlipidemia, hypertension, anxiety on buspirone, and sertraline. History of GERD    She stopped her sertraline and her buspirone because she did not want to be on any more medications. Her daughter called and said that she is crying more and she is more depressed. I strongly recommend that she restart both medications or at least the sertraline in view of her long history of clinical depression. Patient will think about it. Review of Systems   Constitutional: Positive for fatigue. Negative for appetite change and unexpected weight change. HENT: Negative for congestion, ear pain, facial swelling, rhinorrhea, sore throat, tinnitus and trouble swallowing. Hoarse voice   Eyes: Negative for photophobia, pain, discharge, itching and visual disturbance. Respiratory: Negative for cough, shortness of breath, wheezing and stridor. Cardiovascular: Negative for chest pain, palpitations and leg swelling. Gastrointestinal: Positive for constipation. Negative for abdominal pain, anal bleeding, blood in stool, diarrhea, nausea and vomiting. Endocrine: Positive for cold intolerance. Negative for heat intolerance, polydipsia, polyphagia and polyuria. Genitourinary: Positive for urgency. Negative for difficulty urinating, dysuria, flank pain, frequency and hematuria. See HPI   Musculoskeletal: Positive for arthralgias and gait problem. Negative for joint swelling and myalgias. Muscle spasms at night   Skin: Negative for color change, pallor and rash. Allergic/Immunologic: Negative for environmental allergies and food allergies.    Neurological: Negative for dizziness, tremors, seizures, syncope, speech difficulty, Units into the skin daily, Disp: 5 pen, Rfl: 3    Insulin Pen Needle (B-D ULTRAFINE III SHORT PEN) 31G X 8 MM MISC, Inject 1 each as directed daily BD Ultra Fine, Disp: 100 each, Rfl: 2     Allergies   Allergen Reactions    Ibuprofen     Lisinopril      Other reaction(s): Cough    Nsaids Other (See Comments)    Pregabalin Swelling    Simvastatin     Statins Other (See Comments)     Muscle weakness         Past Medical History:   Diagnosis Date    Anemia     Chronic kidney disease     Stage III    Chronic pain     Cirrhosis (HCC)     Depression     Fibromyalgia     Gastroparesis     Pernicious anemia     Thyroid nodule        Health Maintenance Due   Topic Date Due    Shingles Vaccine (2 of 3) 08/06/2014        Patient Active Problem List   Diagnosis    Essential hypertension    Slow transit constipation    Type 2 diabetes mellitus (HCC)    Gastroparesis    Chronic pain    Fibromyalgia    Thyroid nodule    Pernicious anemia    Hypothyroidism    Abnormal liver enzymes    Bladder prolapse, female, acquired    Urge incontinence    Fatty liver    Depression    Mixed hyperlipidemia    Chronic kidney disease    Preoperative clearance        Past Surgical History:   Procedure Laterality Date    APPENDECTOMY      CHOLECYSTECTOMY      EYE SURGERY      cataract OU    HEMORRHOID SURGERY      HYSTERECTOMY, TOTAL ABDOMINAL      JOINT REPLACEMENT Left     Knee     KNEE ARTHROPLASTY Right     SUBTOTAL COLECTOMY          Family History   Problem Relation Age of Onset    Other Mother         cva    High Blood Pressure Mother     Other Father         heart disease         Social History     Tobacco Use    Smoking status: Never Smoker    Smokeless tobacco: Never Used   Substance Use Topics    Alcohol use: Not Currently    Drug use: Yes     Comment: Norco 5/325        Objective  Vitals:    01/23/20 1252   BP: 126/78   Weight: 161 lb (73 kg)   Height: 5' 1\" (1.549 m)        Exam:  Physical Exam  Constitutional:       Appearance: She is well-developed. HENT:      Head: Normocephalic. Right Ear: External ear normal.      Left Ear: External ear normal.      Nose: Nose normal.      Mouth/Throat:      Pharynx: No oropharyngeal exudate. Eyes:      General: No scleral icterus. Right eye: No discharge. Left eye: No discharge. Conjunctiva/sclera: Conjunctivae normal.      Pupils: Pupils are equal, round, and reactive to light. Neck:      Musculoskeletal: Normal range of motion and neck supple. Thyroid: Thyromegaly present. Comments: Large left thyroid nodule  Cardiovascular:      Rate and Rhythm: Normal rate and regular rhythm. Heart sounds: Normal heart sounds. No murmur. No friction rub. No gallop. Pulmonary:      Effort: Pulmonary effort is normal. No respiratory distress. Breath sounds: Normal breath sounds. No wheezing or rales. Chest:      Chest wall: No tenderness. Abdominal:      General: Bowel sounds are normal. There is no distension. Palpations: Abdomen is soft. There is no mass. Tenderness: There is no tenderness. There is no guarding or rebound. Musculoskeletal:         General: Tenderness present. No deformity. Comments: Tender to palpation both knees. 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 Lymphadenopathy:      Cervical: No cervical adenopathy. Skin:     General: Skin is warm and dry. Coloration: Skin is not pale. Findings: No erythema or rash. Neurological:      Mental Status: She is alert and oriented to person, place, and time. Cranial Nerves: No cranial nerve deficit. Sensory: No sensory deficit. Deep Tendon Reflexes: Reflexes normal.   Psychiatric:         Behavior: Behavior normal.         Thought Content: Thought content normal.         Judgment: Judgment normal.          Last labs reviewed.       ASSESSMENT & PLAN : anything. Await reports from Dr. Susana Franklin. I believe they are going to do an EKG and blood work on her. If these are normal then she will be cleared for surgery                Return in about 1 month (around 2/23/2020) for med refill.        Jia Ash, DO  1/23/2020

## 2020-01-27 ENCOUNTER — TELEPHONE (OUTPATIENT)
Dept: PRIMARY CARE CLINIC | Age: 85
End: 2020-01-27

## 2020-01-27 NOTE — TELEPHONE ENCOUNTER
Francisca Araiza called and said that she was here last Thursday for pre op clearance and Dr Elham Becerra has not received anything clearing her. She said her sx is 2/4/20 and she will be seeing him this week for preop.

## 2020-01-28 ENCOUNTER — TELEPHONE (OUTPATIENT)
Dept: PRIMARY CARE CLINIC | Age: 85
End: 2020-01-28

## 2020-01-28 NOTE — TELEPHONE ENCOUNTER
Patient called she was upset because Dr. Elham Becerra' office has not received surgery clearance papers. Informed her they were faxed. She states \"They better be\". She also states A1C results needs faxed as well. Informed her I will inform Ginger De La O.         Electronically signed by Debbie Carson LPN on 1/38/96 at 6:41 PM

## 2020-01-29 NOTE — TELEPHONE ENCOUNTER
I faxed these multiple times and received fax confirmations. If they were still not received, she is more than welcome to come pick them up and deliver them herself.

## 2020-02-04 LAB
AVERAGE GLUCOSE: 148
HBA1C MFR BLD: 6.8 %

## 2020-02-22 PROBLEM — Z01.818 PREOPERATIVE CLEARANCE: Status: RESOLVED | Noted: 2020-01-23 | Resolved: 2020-02-22

## 2020-02-24 ASSESSMENT — ENCOUNTER SYMPTOMS
SORE THROAT: 0
EYE DISCHARGE: 0
WHEEZING: 0
STRIDOR: 0
EYE PAIN: 0
VOMITING: 0
COUGH: 0
NAUSEA: 0
TROUBLE SWALLOWING: 0
COLOR CHANGE: 0
FACIAL SWELLING: 0
RHINORRHEA: 0
CONSTIPATION: 1
ABDOMINAL PAIN: 0
PHOTOPHOBIA: 0
DIARRHEA: 0
ANAL BLEEDING: 0
EYE ITCHING: 0
BLOOD IN STOOL: 0
SHORTNESS OF BREATH: 0

## 2020-02-24 NOTE — PROGRESS NOTES
medication along with side effects of constipation, hypersomnolence and mood changes. If she takes 1 or 2 Vicodin a day when her symptoms act up this controls her pain. Concerned that she may be laid up after surgery and be unable to come here for her monthly refill. I told her that if this is the case she could send someone to  her prescription for that month only. She saw Dr. Rachel Marquis and gave her shots in her right knee which did not work. She wants to try Voltaren gel again as this has worked in the past  She needs 3 tubes per month. . She is willing to go back to Dr. Rachel Marquis after the surgery to see if he is agreeable to doing a total knee on her. She has a known goiter but has normal TSH and free T4. She said the last time she was found to have an underactive thyroid she had to have a nuclear uptake and scan to prove this. .  Thyroid uptake was significantly below normal.  She had a left thyroid nodule about 1.9 cm and the recommendation was for thyroid biopsy. This was done and was negative for malignancy. .   She complains of fatigue, feels cold all the time, constipated, voice is a little hoarse. Interestingly her TSH and free T4 are normal.  We checked autoantibodies for thyroiditis and they were within normal limits . I opted to put her low-dose levothyroxine and recheck TSH in future. Rest of her blood work in February 2020 showed Hemoglobin A1c of 6.8 % her creatinine was 1.2 8with.  3 liver tests were slightly elevated. Repeat liver enzymes were normal.  We  ordeed an ultrasound of her liver with elastography. This showed mildly enlarged liver with clinically significant fibrosis. . Does not want to go back to GI for further treatment. She had a history of pernicious anemia and B12 deficiency. She gets monthly B12 shots but her last B12 level was over 2000. We will hold on the B12 shots until I can recheck a level in about a month.   Repeat level was about 500 so still within normal limits. In a few months of a recheck it limits down then we will restart her B12 shots      She has a history of type 2 diabetes mellitus. On insulin. She checks her blood sugars once in a while but is not too keen on checking them more than 2 or 3 times a week. She has a history of hyperlipidemia, hypertension, anxiety on buspirone, and sertraline. History of GERD    She stopped her sertraline and her buspirone because she did not want to be on any more medications. Her daughter called and said that she is crying more and she is more depressed. I strongly recommend that she restart both medications or at least the sertraline in view of her long history of clinical depression. Patient will think about it. Review of Systems   Constitutional: Positive for fatigue. Negative for appetite change and unexpected weight change. HENT: Negative for congestion, ear pain, facial swelling, rhinorrhea, sore throat, tinnitus and trouble swallowing. Hoarse voice   Eyes: Negative for photophobia, pain, discharge, itching and visual disturbance. Respiratory: Negative for cough, shortness of breath, wheezing and stridor. Cardiovascular: Negative for chest pain, palpitations and leg swelling. Gastrointestinal: Positive for constipation. Negative for abdominal pain, anal bleeding, blood in stool, diarrhea, nausea and vomiting. Endocrine: Positive for cold intolerance. Negative for heat intolerance, polydipsia, polyphagia and polyuria. Genitourinary: Positive for urgency. Negative for difficulty urinating, dysuria, flank pain, frequency and hematuria. See HPI   Musculoskeletal: Positive for arthralgias and gait problem. Negative for joint swelling and myalgias. Muscle spasms at night   Skin: Negative for color change, pallor and rash. Allergic/Immunologic: Negative for environmental allergies and food allergies.    Neurological: Negative for dizziness, tremors, seizures, syncope, speech difficulty, weakness, light-headedness, numbness and headaches. Hematological: Negative for adenopathy. Does not bruise/bleed easily. Psychiatric/Behavioral: Negative for agitation, behavioral problems, confusion, sleep disturbance and suicidal ideas. The patient is nervous/anxious. Current Outpatient Medications:     HYDROcodone-acetaminophen (NORCO) 5-325 MG per tablet, Take 1 tablet by mouth every 12 hours as needed for Pain for up to 7 days. , Disp: 30 tablet, Rfl: 0    Blood Glucose Monitoring Suppl (BLOOD GLUCOSE MONITOR SYSTEM) w/Device KIT, Test blood sugar twice daily, and as needed for symptoms of low blood sugar.  (Patient is insulin dependant.), Disp: 1 kit, Rfl: 0    blood glucose test strips (ASCENSIA AUTODISC VI;ONE TOUCH ULTRA TEST VI) strip, 1 each by In Vitro route 2 times daily as needed (Symptoms of low blood sugar), Disp: 100 each, Rfl: 5    Lancets 28G MISC, Test blood sugar twice daily and as needed for symptoms of low blood sugar., Disp: 100 each, Rfl: 5    diclofenac sodium 1 % GEL, Apply 2 g topically 2 times daily, Disp: 3 Tube, Rfl: 5    levothyroxine (SYNTHROID) 50 MCG tablet, Take 1 tablet by mouth Daily Take 1 tab on Mon-Fri, not on Sat or Sun., Disp: 90 tablet, Rfl: 3    amLODIPine (NORVASC) 10 MG tablet, Take 1 tablet by mouth daily, Disp: 90 tablet, Rfl: 3    ezetimibe (ZETIA) 10 MG tablet, Take 1 tablet by mouth daily, Disp: 90 tablet, Rfl: 3    glimepiride (AMARYL) 1 MG tablet, Take 1 tablet by mouth 2 times daily, Disp: 180 tablet, Rfl: 3    lansoprazole (PREVACID) 30 MG delayed release capsule, Take 1 capsule by mouth daily, Disp: 90 capsule, Rfl: 3    insulin glargine (LANTUS SOLOSTAR) 100 UNIT/ML injection pen, Inject 12 Units into the skin daily, Disp: 5 pen, Rfl: 3    Insulin Pen Needle (B-D ULTRAFINE III SHORT PEN) 31G X 8 MM MISC, Inject 1 each as directed daily BD Ultra Fine, Disp: 100 each, Rfl: 2    oxybutynin (DITROPAN XL) 10 MG

## 2020-02-25 ENCOUNTER — OFFICE VISIT (OUTPATIENT)
Dept: PRIMARY CARE CLINIC | Age: 85
End: 2020-02-25
Payer: MEDICARE

## 2020-02-25 VITALS
DIASTOLIC BLOOD PRESSURE: 64 MMHG | HEART RATE: 99 BPM | BODY MASS INDEX: 29.45 KG/M2 | HEIGHT: 61 IN | WEIGHT: 156 LBS | SYSTOLIC BLOOD PRESSURE: 126 MMHG | TEMPERATURE: 97.4 F | OXYGEN SATURATION: 95 %

## 2020-02-25 PROCEDURE — 99213 OFFICE O/P EST LOW 20 MIN: CPT | Performed by: INTERNAL MEDICINE

## 2020-02-25 RX ORDER — HYDROCODONE BITARTRATE AND ACETAMINOPHEN 5; 325 MG/1; MG/1
1 TABLET ORAL EVERY 12 HOURS PRN
Qty: 30 TABLET | Refills: 0 | Status: SHIPPED
Start: 2020-02-25 | End: 2020-03-25 | Stop reason: SDUPTHER

## 2020-02-25 NOTE — ASSESSMENT & PLAN NOTE
Watch her diet. Monitor her blood sugars as directed. Let me know if her blood sugars are consistently elevated over 120 fasting.

## 2020-03-25 RX ORDER — HYDROCODONE BITARTRATE AND ACETAMINOPHEN 5; 325 MG/1; MG/1
1 TABLET ORAL EVERY 12 HOURS PRN
Qty: 30 TABLET | Refills: 0 | Status: SHIPPED
Start: 2020-03-25 | End: 2020-08-31 | Stop reason: SDUPTHER

## 2020-05-20 ENCOUNTER — TELEPHONE (OUTPATIENT)
Dept: ADMINISTRATIVE | Age: 85
End: 2020-05-20

## 2020-05-20 ENCOUNTER — VIRTUAL VISIT (OUTPATIENT)
Dept: PRIMARY CARE CLINIC | Age: 85
End: 2020-05-20
Payer: MEDICARE

## 2020-05-20 VITALS — BODY MASS INDEX: 29.45 KG/M2 | HEIGHT: 61 IN | WEIGHT: 156 LBS

## 2020-05-20 PROBLEM — N32.81 OVERACTIVE BLADDER: Status: ACTIVE | Noted: 2020-05-20

## 2020-05-20 PROCEDURE — 99442 PR PHYS/QHP TELEPHONE EVALUATION 11-20 MIN: CPT | Performed by: INTERNAL MEDICINE

## 2020-05-20 RX ORDER — NITROFURANTOIN 25; 75 MG/1; MG/1
CAPSULE ORAL
Qty: 14 CAPSULE | Refills: 0 | Status: SHIPPED
Start: 2020-05-20 | End: 2020-08-31

## 2020-05-20 RX ORDER — OXYBUTYNIN CHLORIDE 10 MG/1
10 TABLET, EXTENDED RELEASE ORAL DAILY
COMMUNITY
End: 2021-04-24

## 2020-05-20 ASSESSMENT — ENCOUNTER SYMPTOMS
SORE THROAT: 0
WHEEZING: 0
COUGH: 0
PHOTOPHOBIA: 0
RHINORRHEA: 0
NAUSEA: 0
ABDOMINAL PAIN: 0
FACIAL SWELLING: 0
EYE ITCHING: 0
SHORTNESS OF BREATH: 0
VOMITING: 0
TROUBLE SWALLOWING: 0
EYE DISCHARGE: 0
COLOR CHANGE: 0
STRIDOR: 0
DIARRHEA: 0
BLOOD IN STOOL: 0
ANAL BLEEDING: 0
CONSTIPATION: 1
EYE PAIN: 0

## 2020-05-20 NOTE — PROGRESS NOTES
polyphagia and polyuria. Genitourinary: Positive for dysuria and urgency. Negative for difficulty urinating, flank pain, frequency and hematuria. See HPI   Musculoskeletal: Positive for arthralgias and gait problem. Negative for joint swelling and myalgias. Muscle spasms at night   Skin: Negative for color change, pallor and rash. Allergic/Immunologic: Negative for environmental allergies and food allergies. Neurological: Negative for dizziness, tremors, seizures, syncope, speech difficulty, weakness, light-headedness, numbness and headaches. Hematological: Negative for adenopathy. Does not bruise/bleed easily. Psychiatric/Behavioral: Negative for agitation, behavioral problems, confusion, sleep disturbance and suicidal ideas. The patient is nervous/anxious. Current Outpatient Medications:     oxybutynin (DITROPAN-XL) 10 MG extended release tablet, Take 10 mg by mouth daily, Disp: , Rfl:     nitrofurantoin, macrocrystal-monohydrate, (MACROBID) 100 MG capsule, Take one twice a day for 7 days, Disp: 14 capsule, Rfl: 0    HYDROcodone-acetaminophen (NORCO) 5-325 MG per tablet, Take 1 tablet by mouth every 12 hours as needed for Pain for up to 7 days. , Disp: 30 tablet, Rfl: 0    Blood Glucose Monitoring Suppl (BLOOD GLUCOSE MONITOR SYSTEM) w/Device KIT, Test blood sugar twice daily, and as needed for symptoms of low blood sugar.  (Patient is insulin dependant.), Disp: 1 kit, Rfl: 0    blood glucose test strips (ASCENSIA AUTODISC VI;ONE TOUCH ULTRA TEST VI) strip, 1 each by In Vitro route 2 times daily as needed (Symptoms of low blood sugar), Disp: 100 each, Rfl: 5    Lancets 28G MISC, Test blood sugar twice daily and as needed for symptoms of low blood sugar., Disp: 100 each, Rfl: 5    diclofenac sodium 1 % GEL, Apply 2 g topically 2 times daily, Disp: 3 Tube, Rfl: 5    levothyroxine (SYNTHROID) 50 MCG tablet, Take 1 tablet by mouth Daily Take 1 tab on Mon-Fri, not on Sat or Sun., Disp: 90 tablet, Rfl: 3    amLODIPine (NORVASC) 10 MG tablet, Take 1 tablet by mouth daily, Disp: 90 tablet, Rfl: 3    ezetimibe (ZETIA) 10 MG tablet, Take 1 tablet by mouth daily, Disp: 90 tablet, Rfl: 3    glimepiride (AMARYL) 1 MG tablet, Take 1 tablet by mouth 2 times daily, Disp: 180 tablet, Rfl: 3    lansoprazole (PREVACID) 30 MG delayed release capsule, Take 1 capsule by mouth daily, Disp: 90 capsule, Rfl: 3    insulin glargine (LANTUS SOLOSTAR) 100 UNIT/ML injection pen, Inject 12 Units into the skin daily, Disp: 5 pen, Rfl: 3    Insulin Pen Needle (B-D ULTRAFINE III SHORT PEN) 31G X 8 MM MISC, Inject 1 each as directed daily BD Ultra Fine, Disp: 100 each, Rfl: 2     Allergies   Allergen Reactions    Ibuprofen     Lisinopril      Other reaction(s): Cough    Nsaids Other (See Comments)    Pregabalin Swelling    Simvastatin     Statins Other (See Comments)     Muscle weakness         Past Medical History:   Diagnosis Date    Anemia     Chronic kidney disease     Stage III    Chronic pain     Cirrhosis (HCC)     Depression     Fibromyalgia     Gastroparesis     Pernicious anemia     Thyroid nodule        Health Maintenance Due   Topic Date Due    Shingles Vaccine (2 of 3) 08/06/2014        Patient Active Problem List   Diagnosis    Essential hypertension    Slow transit constipation    Type 2 diabetes mellitus (HCC)    Gastroparesis    Chronic pain    Fibromyalgia    Thyroid nodule    Pernicious anemia    Hypothyroidism    Abnormal liver enzymes    Bladder prolapse, female, acquired    Urge incontinence    Fatty liver    Depression    Mixed hyperlipidemia    Chronic kidney disease    Dysuria    Overactive bladder        Past Surgical History:   Procedure Laterality Date    APPENDECTOMY      CHOLECYSTECTOMY      EYE SURGERY      cataract OU    HEMORRHOID SURGERY      HYSTERECTOMY, TOTAL ABDOMINAL      JOINT REPLACEMENT Left     Knee     KNEE ARTHROPLASTY Right     SUBTOTAL COLECTOMY          Family History   Problem Relation Age of Onset    Other Mother         cva    High Blood Pressure Mother     Other Father         heart disease         Social History     Tobacco Use    Smoking status: Never Smoker    Smokeless tobacco: Never Used   Substance Use Topics    Alcohol use: Not Currently    Drug use: Yes     Comment: Norco 5/325        Objective  Vitals:    05/20/20 1451   Weight: 156 lb (70.8 kg)   Height: 5' 1\" (1.549 m)        Exam:       Last labs reviewed. ASSESSMENT & PLAN :   Problem List        Circulatory    Essential hypertension     Blood pressures are stable. Continue medications and monitor blood pressures at home. Call office if systolics are over 103 over diastolics over 90. Check CMP at next office visit         Relevant Medications    amLODIPine (NORVASC) 10 MG tablet       Endocrine    Type 2 diabetes mellitus (HCC)     Continue her Lantus insulin 12 units subcu twice daily and her glimepiride 1 mg twice daily. We will check her hemoglobin A1c and urine microalbumin creatinine ratio on next office visit         Relevant Medications    glimepiride (AMARYL) 1 MG tablet    insulin glargine (LANTUS SOLOSTAR) 100 UNIT/ML injection pen    Insulin Pen Needle (B-D ULTRAFINE III SHORT PEN) 31G X 8 MM MISC    blood glucose test strips (ASCENSIA AUTODISC VI;ONE TOUCH ULTRA TEST VI) strip    Lancets 28G MISC    Blood Glucose Monitoring Suppl (BLOOD GLUCOSE MONITOR SYSTEM) w/Device KIT    Hypothyroidism     Continue low-dose levothyroxine and will check a TSH and free T4 on her next office visit         Relevant Medications    levothyroxine (SYNTHROID) 50 MCG tablet       Genitourinary    Overactive bladder     Increase oxybutynin to 2 pills equivalent of 20 mg once a day. If her mouth gets too dry let us know.   Follow-up with Dr. Maggy Isaacs as directed            Other    Chronic pain     Continue her Norco.  She does not need a refill today

## 2020-05-20 NOTE — ASSESSMENT & PLAN NOTE
Patient is unable to come in give a urine sample because of transportation difficulties. We will empirically put her on Macrodantin 100 mg twice a day for 7 days. Increase her fluids. If no better will insist that she comes in and gives a sample for culture and sensitivity.   She already has an appointment on June 9, 2020 and will recheck her urine

## 2020-06-16 ENCOUNTER — OFFICE VISIT (OUTPATIENT)
Dept: PRIMARY CARE CLINIC | Age: 85
End: 2020-06-16
Payer: MEDICARE

## 2020-06-16 VITALS
OXYGEN SATURATION: 96 % | HEART RATE: 66 BPM | DIASTOLIC BLOOD PRESSURE: 80 MMHG | SYSTOLIC BLOOD PRESSURE: 138 MMHG | WEIGHT: 159 LBS | HEIGHT: 61 IN | TEMPERATURE: 98.2 F | BODY MASS INDEX: 30.02 KG/M2

## 2020-06-16 LAB
A/G RATIO: 1.2 RATIO (ref 1.1–2.2)
ALBUMIN SERPL-MCNC: 4.4 G/DL (ref 3.4–4.8)
ALP BLD-CCNC: 79 U/L (ref 42–121)
ALT SERPL-CCNC: 15 U/L (ref 10–54)
ANION GAP SERPL CALCULATED.3IONS-SCNC: 11 MEQ/L (ref 3–11)
AST SERPL-CCNC: 25 U/L (ref 10–41)
BILIRUB SERPL-MCNC: 0.8 MG/DL (ref 0.3–1.5)
BUN BLDV-MCNC: 36 MG/DL (ref 8–21)
CALCIUM SERPL-MCNC: 9.6 MG/DL (ref 8.5–10.5)
CHLORIDE BLD-SCNC: 105 MEQ/L (ref 98–107)
CHOLESTEROL: 152 MG/DL (ref 140–200)
CO2: 22 MEQ/L (ref 21–31)
CREAT SERPL-MCNC: 1.1 MG/DL (ref 0.4–1)
CREATININE + EGFR PANEL: 57 ML/MIN
CREATININE URINE: 127.7 MG/DL (ref 22–328)
ESTIMATED AVERAGE GLUCOSE: 143 MG/DL
GFR NON-AFRICAN AMERICAN: 47 ML/MIN
GLOBULIN: 3.7 G/DL (ref 1.9–3.9)
GLUCOSE BLD-MCNC: 102 MG/DL (ref 70–99)
HBA1C MFR BLD: 6.6 % (ref 4–6)
HDLC SERPL-MCNC: 55 MG/DL (ref 35–85)
LDL CHOLESTEROL: 77 MG/DL
LDL/HDL RATIO: 1.4 RATIO
MICROALBUMIN UR-MCNC: 213.2 UG/ML
MICROALBUMIN/CREAT UR-RTO: 167 MG/G
POTASSIUM SERPL-SCNC: 3.7 MEQ/L (ref 3.6–5)
SODIUM BLD-SCNC: 138 MEQ/L (ref 135–145)
T4 FREE: 0.89 NG/DL (ref 0.61–1.12)
TOTAL PROTEIN: 8.1 G/DL (ref 5.9–7.8)
TRIGL SERPL-MCNC: 94 MG/DL (ref 41–189)
TSH SERPL DL<=0.05 MIU/L-ACNC: 0.73 UIU/ML (ref 0.34–5.6)

## 2020-06-16 PROCEDURE — 99214 OFFICE O/P EST MOD 30 MIN: CPT | Performed by: INTERNAL MEDICINE

## 2020-06-16 RX ORDER — LANCETS 23 GAUGE
EACH MISCELLANEOUS
Qty: 100 EACH | Refills: 5 | Status: SHIPPED
Start: 2020-06-16 | End: 2020-11-18 | Stop reason: SDUPTHER

## 2020-06-16 RX ORDER — INSULIN GLARGINE 100 [IU]/ML
12 INJECTION, SOLUTION SUBCUTANEOUS DAILY
Qty: 5 PEN | Refills: 3 | Status: SHIPPED
Start: 2020-06-16 | End: 2020-11-18 | Stop reason: SDUPTHER

## 2020-06-16 ASSESSMENT — ENCOUNTER SYMPTOMS
FACIAL SWELLING: 0
DIARRHEA: 0
BLOOD IN STOOL: 0
SORE THROAT: 0
EYE ITCHING: 0
CONSTIPATION: 1
EYE PAIN: 0
ABDOMINAL PAIN: 0
COLOR CHANGE: 0
TROUBLE SWALLOWING: 0
STRIDOR: 0
RHINORRHEA: 0
WHEEZING: 0
EYE DISCHARGE: 0
PHOTOPHOBIA: 0
NAUSEA: 0
ANAL BLEEDING: 0
COUGH: 0
SHORTNESS OF BREATH: 0
VOMITING: 0

## 2020-06-16 NOTE — PROGRESS NOTES
2020    Name: Marco Antonio Abdullahi : 1935 Sex: female  Age: 81 y.o. Subjective:  Chief Complaint   Patient presents with    Other    Labs Only        Other   Associated symptoms include arthralgias and fatigue. Pertinent negatives include no abdominal pain, chest pain, congestion, coughing, headaches, joint swelling, myalgias, nausea, numbness, rash, sore throat, vomiting or weakness. This 81y.o. -year-old female  presents today for evaluation and management of her  chronic medical problems. Current medication list reviewed. The patient is tolerating all medications well without adverse events or known side effects. The patient does understand the risk and benefits of the prescribed medications    She has a history of mixed incontinence and was referred initially to Dr. Daryn Huynh. Reviewed his consult. He felt that she should see a uro-gynecologist and referred her to Dr. Norma Sylvester who did her for surgery at [facility] in [facility] on 2020. Reviewed his records. She says she still has problems with  mixed incontinence but he tells it will  get better . Is been treated for a UTI. She will be following up with Dr. Delfino Plata. She had an InterStim trial which she failed. She had severe leg pain after the device was removed. This has improved. She will be going back to see him in follow-up. No previous history of cardiac disease. She was admitted in 2018 with some palpitations and underwent cardiac evaluation . She  had a stress test which was negative for ischemia. She also had an echocardiogram..  She denies any chest pain, chest pressure, palpitations, dyspnea or lightheadedness. Patient has a history of chronic pain syndrome. She has pain \"all over\". Mainly her knees. She has a lot of muscle spasms which keep her up at night. She would like to try Flexeril again.   She tried Tizanidine and other anti-spasm medications and this did not help. Only Flexeril helped her. We discussed the fact that this was a problematic medication for people over 65. She says that if it works for her she wants to take it. She says she is not using her Norco at this time. She is using her Voltaren gel which helps . She is going to let me know if she wants another Norco prescription. [facility] report reviewed. .  She has a known goiter but has normal TSH and free T4. She said the last time she was found to have an underactive thyroid she had to have a nuclear uptake and scan to prove this. .  Thyroid uptake was significantly below normal.  She had a left thyroid nodule about 1.9 cm and the recommendation was for thyroid biopsy. This was done and was negative for malignancy. .   She complains of fatigue, feels cold all the time, constipated, voice is a little hoarse. Interestingly her TSH and free T4 are normal.  We checked autoantibodies for thyroiditis and they were within normal limits . I opted to put her low-dose levothyroxine and recheck TSH in future. Rest of her blood work in February 2020 showed Hemoglobin A1c of 6.8 % her creatinine was 1.2 8with.  3 liver tests were slightly elevated. Repeat liver enzymes were normal.  We  ordeed an ultrasound of her liver with elastography. This showed mildly enlarged liver with clinically significant fibrosis. . Does not want to go back to GI for further treatment. She had a history of pernicious anemia and B12 deficiency. She gets monthly B12 shots but her last B12 level was over 2000. We will hold on the B12 shots until I can recheck a level in about a month. Repeat level was about 500 so still within normal limits. In a few months of a recheck it limits down then we will restart her B12 shots      She has a history of type 2 diabetes mellitus. On insulin. She checks her blood sugars once in a while but is not too keen on checking them more than 2 or 3 times a week.       She has a Cough    Nsaids Other (See Comments)    Pregabalin Swelling    Simvastatin     Statins Other (See Comments)     Muscle weakness         Past Medical History:   Diagnosis Date    Anemia     Chronic kidney disease     Stage III    Chronic pain     Cirrhosis (HCC)     Depression     Fibromyalgia     Gastroparesis     Pernicious anemia     Thyroid nodule        Health Maintenance Due   Topic Date Due    Shingles Vaccine (2 of 3) 08/06/2014        Patient Active Problem List   Diagnosis    Essential hypertension    Slow transit constipation    Type 2 diabetes mellitus (HCC)    Gastroparesis    Chronic pain    Fibromyalgia    Thyroid nodule    Pernicious anemia    Hypothyroidism    Abnormal liver enzymes    Bladder prolapse, female, acquired    Urge incontinence    Fatty liver    Depression    Mixed hyperlipidemia    Chronic kidney disease    Dysuria    Overactive bladder        Past Surgical History:   Procedure Laterality Date    APPENDECTOMY      CHOLECYSTECTOMY      EYE SURGERY      cataract OU    HEMORRHOID SURGERY      HYSTERECTOMY, TOTAL ABDOMINAL      JOINT REPLACEMENT Left     Knee     KNEE ARTHROPLASTY Right     SUBTOTAL COLECTOMY          Family History   Problem Relation Age of Onset    Other Mother         cva    High Blood Pressure Mother     Other Father         heart disease         Social History     Tobacco Use    Smoking status: Never Smoker    Smokeless tobacco: Never Used   Substance Use Topics    Alcohol use: Not Currently    Drug use: Yes     Comment: Norco 5/325        Objective  Vitals:    06/16/20 0827   BP: 138/80   Site: Right Upper Arm   Position: Sitting   Cuff Size: Medium Adult   Pulse: 66   Temp: 98.2 °F (36.8 °C)   TempSrc: Temporal   SpO2: 96%   Weight: 159 lb (72.1 kg)   Height: 5' 1\" (1.549 m)        Exam:  Physical Exam  Constitutional:       Appearance: She is well-developed. HENT:      Head: Normocephalic.       Right Ear: External ear normal.      Left Ear: External ear normal.      Nose: Nose normal.      Mouth/Throat:      Pharynx: No oropharyngeal exudate. Eyes:      General: No scleral icterus. Right eye: No discharge. Left eye: No discharge. Conjunctiva/sclera: Conjunctivae normal.      Pupils: Pupils are equal, round, and reactive to light. Neck:      Musculoskeletal: Normal range of motion and neck supple. Thyroid: Thyromegaly present. Comments: Large left thyroid nodule  Cardiovascular:      Rate and Rhythm: Normal rate and regular rhythm. Heart sounds: Normal heart sounds. No murmur. No friction rub. No gallop. Pulmonary:      Effort: Pulmonary effort is normal. No respiratory distress. Breath sounds: Normal breath sounds. No wheezing or rales. Chest:      Chest wall: No tenderness. Abdominal:      General: Bowel sounds are normal. There is no distension. Palpations: Abdomen is soft. There is no mass. Tenderness: There is no abdominal tenderness. There is no guarding or rebound. Musculoskeletal:         General: Tenderness present. No deformity. Comments: Tender to palpation both knees. 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 Lymphadenopathy:      Cervical: No cervical adenopathy. Skin:     General: Skin is warm and dry. Coloration: Skin is not pale. Findings: No erythema or rash. Neurological:      Mental Status: She is alert and oriented to person, place, and time. Cranial Nerves: No cranial nerve deficit. Sensory: No sensory deficit. Deep Tendon Reflexes: Reflexes normal.   Psychiatric:         Behavior: Behavior normal.         Thought Content: Thought content normal.         Judgment: Judgment normal.          Last labs reviewed. ASSESSMENT & PLAN :   Problem List        Circulatory    Essential hypertension - Primary     Blood pressures are stable.  Continue medications and monitor blood pressures at home. Call office if systolics are over 513 over diastolics over 90. Relevant Medications    amLODIPine (NORVASC) 10 MG tablet    Other Relevant Orders    Comprehensive Metabolic Panel       Digestive    Gastroparesis     Recommend GI follow-up again. Patient refuses         Fatty liver     Will check liver enzymes. If elevated will recommend another ultrasound of her liver with elastography            Endocrine    Type 2 diabetes mellitus ([address]     Watch her diet. Monitor her blood sugars as directed. Let me know if her blood sugars are consistently elevated over 120 fasting. Check hemoglobin A1c and urine microalbumin creatinine ratio         Relevant Medications    glimepiride (AMARYL) 1 MG tablet    Insulin Pen Needle (B-D ULTRAFINE III SHORT PEN) 31G X 8 MM MISC    Blood Glucose Monitoring Suppl (BLOOD GLUCOSE MONITOR SYSTEM) w/Device KIT    blood glucose test strips (ASCENSIA AUTODISC VI;ONE TOUCH ULTRA TEST VI) strip    Lancets 28G MISC    insulin glargine (LANTUS SOLOSTAR) 100 UNIT/ML injection pen    Other Relevant Orders    Microalbumin / Creatinine Urine Ratio    Hemoglobin A1C    Thyroid nodule     On next office visit recommend ultrasound to make sure the nodule is not enlarging         Relevant Medications    levothyroxine (SYNTHROID) 50 MCG tablet    Hypothyroidism     TSH is maya. l continue low-dose levothyroxine 50 mcg daily         Relevant Medications    levothyroxine (SYNTHROID) 50 MCG tablet    Other Relevant Orders    TSH without Reflex    T4, Free       Genitourinary    Chronic kidney disease     Avoid NSAID's and will monitor kidney functions            Other    Chronic pain     Use Voltaren gel as directed         Fibromyalgia     Continue medication         Depression     Patient resistant to restarting her antidepressant         Mixed hyperlipidemia     Continue Zetia. Intolerant to statins. Watch saturated fats in diet and will monitor lipids

## 2020-08-21 ENCOUNTER — TELEPHONE (OUTPATIENT)
Dept: PRIMARY CARE CLINIC | Age: 85
End: 2020-08-21

## 2020-08-31 ENCOUNTER — TELEPHONE (OUTPATIENT)
Dept: PRIMARY CARE CLINIC | Age: 85
End: 2020-08-31

## 2020-08-31 ENCOUNTER — HOSPITAL ENCOUNTER (OUTPATIENT)
Age: 85
Discharge: HOME | End: 2020-09-02
Payer: MEDICARE

## 2020-08-31 ENCOUNTER — OFFICE VISIT (OUTPATIENT)
Dept: PRIMARY CARE CLINIC | Age: 85
End: 2020-08-31
Payer: MEDICARE

## 2020-08-31 VITALS
SYSTOLIC BLOOD PRESSURE: 128 MMHG | OXYGEN SATURATION: 98 % | HEART RATE: 67 BPM | BODY MASS INDEX: 30.4 KG/M2 | HEIGHT: 61 IN | DIASTOLIC BLOOD PRESSURE: 60 MMHG | WEIGHT: 161 LBS | TEMPERATURE: 98.1 F | RESPIRATION RATE: 16 BRPM

## 2020-08-31 PROCEDURE — 87088 URINE BACTERIA CULTURE: CPT

## 2020-08-31 PROCEDURE — 99214 OFFICE O/P EST MOD 30 MIN: CPT | Performed by: INTERNAL MEDICINE

## 2020-08-31 RX ORDER — EZETIMIBE 10 MG/1
10 TABLET ORAL DAILY
Qty: 90 TABLET | Refills: 3 | Status: SHIPPED
Start: 2020-08-31 | End: 2020-09-22 | Stop reason: SDUPTHER

## 2020-08-31 RX ORDER — LEVOTHYROXINE SODIUM 0.05 MG/1
50 TABLET ORAL DAILY
Qty: 90 TABLET | Refills: 3 | Status: SHIPPED
Start: 2020-08-31 | End: 2020-09-22 | Stop reason: SDUPTHER

## 2020-08-31 RX ORDER — PREDNISONE 20 MG/1
20 TABLET ORAL
COMMUNITY
Start: 2020-08-21 | End: 2020-09-22 | Stop reason: ALTCHOICE

## 2020-08-31 RX ORDER — LANSOPRAZOLE 30 MG/1
30 CAPSULE, DELAYED RELEASE ORAL DAILY
Qty: 90 CAPSULE | Refills: 3 | Status: SHIPPED
Start: 2020-08-31 | End: 2020-09-22 | Stop reason: SDUPTHER

## 2020-08-31 RX ORDER — HYDROCODONE BITARTRATE AND ACETAMINOPHEN 5; 325 MG/1; MG/1
1 TABLET ORAL EVERY 12 HOURS PRN
Qty: 30 TABLET | Refills: 0 | Status: SHIPPED
Start: 2020-08-31 | End: 2020-09-22 | Stop reason: SDUPTHER

## 2020-08-31 RX ORDER — GLIMEPIRIDE 1 MG/1
1 TABLET ORAL 2 TIMES DAILY
Qty: 180 TABLET | Refills: 3 | Status: SHIPPED
Start: 2020-08-31 | End: 2020-09-22 | Stop reason: SDUPTHER

## 2020-08-31 ASSESSMENT — ENCOUNTER SYMPTOMS
BLOOD IN STOOL: 0
SORE THROAT: 0
EYE DISCHARGE: 0
TROUBLE SWALLOWING: 0
NAUSEA: 0
CONSTIPATION: 1
COUGH: 0
FACIAL SWELLING: 0
ANAL BLEEDING: 0
EYE PAIN: 0
PHOTOPHOBIA: 0
STRIDOR: 0
WHEEZING: 0
BACK PAIN: 1
DIARRHEA: 0
COLOR CHANGE: 0
VOMITING: 0
SHORTNESS OF BREATH: 0
ABDOMINAL PAIN: 0
EYE ITCHING: 0
RHINORRHEA: 0

## 2020-08-31 NOTE — ASSESSMENT & PLAN NOTE
Continue her 12 units of Lantus insulin in the morning along with her glimepiride.   Monitor blood sugars as before

## 2020-08-31 NOTE — PROGRESS NOTES
2020    Name: Janet Sebastian : 1935 Sex: female  Age: 81 y.o. Subjective:  Chief Complaint   Patient presents with    Urinary Tract Infection    Back Pain        Other   Associated symptoms include arthralgias, fatigue and numbness. Pertinent negatives include no abdominal pain, chest pain, congestion, coughing, headaches, joint swelling, myalgias, nausea, rash, sore throat, vomiting or weakness. Urinary Tract Infection    Associated symptoms include frequency and urgency. Pertinent negatives include no flank pain, hematuria, nausea or vomiting. Back Pain   Associated symptoms include dysuria and numbness. Pertinent negatives include no abdominal pain, chest pain, headaches or weakness. This 81y.o. -year-old female  presents today for evaluation and management of her  chronic medical problems. Current medication list reviewed. The patient is tolerating all medications well without adverse events or known side effects. The patient does understand the risk and benefits of the prescribed medications      Patient was hospitalized recently at [facility] with a UTI and severe low back pain. X-ray showed herniated disc at L4-L5 with impression on L5 nerve root. She has a lot of pain so they did give her her Norco and also put her on fairly high-dose of steroids. Her back is a little better. She is staying at her daughter's house because her own home has stairs. She is undergoing physical therapy  at her daughter's home and when they feel she is safe to go up and down stairs then she can go back to her own home. She still complains of a little bit of dysuria. Urinalysis today showed some glucose and protein but no other sign of infection. We will go ahead and send it for culture    She has a history of mixed incontinence and was referred initially to Dr. Anand Watt. Reviewed his consult.   He felt that she should see a uro-gynecologist and referred her to Dr. Maggy Guerin who did her for surgery at [facility] in [facility] on February 4, 2020. Reviewed his records. She says she still has problems with  mixed incontinence but he tells it will  get better she has recurrent urgency and overflow incontinence. She will be seeing him in September 2020. Helder Goel She had an InterStim trial which she failed. She had severe leg pain after the device was removed. This has improved. .    No previous history of cardiac disease. She was admitted in August 2018 with some palpitations and underwent cardiac evaluation . She  had a stress test which was negative for ischemia. She also had an echocardiogram..  She denies any chest pain, chest pressure, palpitations, dyspnea or lightheadedness. Patient has a history of chronic pain syndrome. She has pain \"all over\". Mainly her knees. She has a lot of muscle spasms which keep her up at night. She would like to try Flexeril again. She tried Tizanidine and other anti-spasm medications and this did not help. Only Flexeril helped her. We discussed the fact that this was a problematic medication for people over 65. She says that if it works for her she wants to take it. . She tried Voltaren gel and it did help for a while. [place] report reviewed and the last Norco that she got was 20 pills on August 21,2020. She is well aware of the addictive potential of this medication. She has tolerated this well with very few side effects. She has a known goiter but has normal TSH and free T4. She said the last time she was found to have an underactive thyroid she had to have a nuclear uptake and scan to prove this. .  Thyroid uptake was significantly below normal.  She had a left thyroid nodule about 1.9 cm and the recommendation was for thyroid biopsy. This was done and was negative for malignancy. .   She complains of fatigue, feels cold all the time, constipated, voice is a little hoarse.   Interestingly her TSH and  Abnormal liver enzymes    Bladder prolapse, female, acquired    Urge incontinence    Fatty liver    Depression    Mixed hyperlipidemia    Chronic kidney disease    Dysuria    Overactive bladder    Herniated lumbar intervertebral disc        Past Surgical History:   Procedure Laterality Date    APPENDECTOMY      CHOLECYSTECTOMY      EYE SURGERY      cataract OU    HEMORRHOID SURGERY      HYSTERECTOMY, TOTAL ABDOMINAL      JOINT REPLACEMENT Left     Knee     KNEE ARTHROPLASTY Right     SUBTOTAL COLECTOMY          Family History   Problem Relation Age of Onset    Other Mother         cva    High Blood Pressure Mother     Other Father         heart disease         Social History     Tobacco Use    Smoking status: Never Smoker    Smokeless tobacco: Never Used   Substance Use Topics    Alcohol use: Not Currently    Drug use: Yes     Comment: Norco 5/325        Objective  Vitals:    08/31/20 1433   BP: 128/60   Pulse: 67   Resp: 16   Temp: 98.1 °F (36.7 °C)   SpO2: 98%   Weight: 161 lb (73 kg)   Height: 5' 1\" (1.549 m)        Exam:  Physical Exam  Constitutional:       General: She is not in acute distress. Appearance: Normal appearance. She is well-developed. She is not ill-appearing. HENT:      Head: Normocephalic. Right Ear: External ear normal.      Left Ear: External ear normal.   Eyes:      General: No scleral icterus. Right eye: No discharge. Left eye: No discharge. Extraocular Movements: Extraocular movements intact. Conjunctiva/sclera: Conjunctivae normal.      Pupils: Pupils are equal, round, and reactive to light. Neck:      Musculoskeletal: Normal range of motion and neck supple. Thyroid: Thyromegaly present. Comments: Large left thyroid nodule  Cardiovascular:      Rate and Rhythm: Normal rate and regular rhythm. Heart sounds: Normal heart sounds. No murmur. No friction rub. No gallop.     Pulmonary:      Effort: Pulmonary effort is normal. No respiratory distress. Breath sounds: Normal breath sounds. No wheezing or rales. Chest:      Chest wall: No tenderness. Abdominal:      General: Bowel sounds are normal. There is no distension. Palpations: Abdomen is soft. There is no mass. Tenderness: There is no abdominal tenderness. There is no guarding or rebound. Musculoskeletal:         General: Tenderness present. No deformity. Comments: Tender to palpation both knees. 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 Tenderness to palpation lower lumbar spine with decreased range of motion to flexion   Lymphadenopathy:      Cervical: No cervical adenopathy. Skin:     General: Skin is warm and dry. Coloration: Skin is not pale. Findings: No erythema or rash. Neurological:      Mental Status: She is alert and oriented to person, place, and time. Cranial Nerves: No cranial nerve deficit. Sensory: No sensory deficit. Deep Tendon Reflexes: Reflexes normal.      Comments: Decreased sensation to light touch below the knee left more so than right   Psychiatric:         Mood and Affect: Mood normal.         Behavior: Behavior normal.         Thought Content: Thought content normal.         Judgment: Judgment normal.          Last labs reviewed.       ASSESSMENT & PLAN :   Problem List        Endocrine    Type 2 diabetes mellitus (HCC) - Primary    Relevant Medications    Insulin Pen Needle (B-D ULTRAFINE III SHORT PEN) 31G X 8 MM MISC    Blood Glucose Monitoring Suppl (BLOOD GLUCOSE MONITOR SYSTEM) w/Device KIT    blood glucose test strips (ASCENSIA AUTODISC VI;ONE TOUCH ULTRA TEST VI) strip    Lancets 28G MISC    insulin glargine (LANTUS SOLOSTAR) 100 UNIT/ML injection pen    glimepiride (AMARYL) 1 MG tablet    Hypothyroidism    Relevant Medications    levothyroxine (SYNTHROID) 50 MCG tablet       Other    Chronic pain    Relevant Medications predniSONE (DELTASONE) 20 MG tablet    HYDROcodone-acetaminophen (NORCO) 5-325 MG per tablet    Mixed hyperlipidemia    Relevant Medications    amLODIPine (NORVASC) 10 MG tablet    ezetimibe (ZETIA) 10 MG tablet    Dysuria    Relevant Medications    nitrofurantoin, macrocrystal-monohydrate, (MACROBID) 100 MG capsule    Other Relevant Orders    Culture, Urine           Return in about 3 weeks (around 9/21/2020).        Ashley Contreras DO  8/31/2020

## 2020-09-03 LAB — URINE CULTURE, ROUTINE: NORMAL

## 2020-09-04 ENCOUNTER — TELEPHONE (OUTPATIENT)
Dept: PRIMARY CARE CLINIC | Age: 85
End: 2020-09-04

## 2020-09-04 NOTE — TELEPHONE ENCOUNTER
Last Appointment:  8/31/2020  Future Appointments   Date Time Provider Gerry Cardosoi   9/22/2020 10:30 AM [address] [facility], DO [facility]      Patient's daughter called about low blood sugar readings. Patient did have a 49 reading. Daughter is asking if there should be any changes to medication? I asked if there is any changes to patient's eating habits. Daughter reports there is due to patient staying with her. She does eat a bigger meal at dinner but smaller during the day. Daughter has faxed over BS readings and informed patient to hold noon insulin until evening today. Daughter is concerned. If you have time please call daughter at [phone].     Electronically signed by Erin Barclay LPN on 9/2/4901 at 84:90 PM

## 2020-09-08 NOTE — TELEPHONE ENCOUNTER
Discontinue glimepiride. Decrease Lantus to 10 units a day.   Check fasting blood sugars forone week and call early next week

## 2020-09-22 ENCOUNTER — OFFICE VISIT (OUTPATIENT)
Dept: PRIMARY CARE CLINIC | Age: 85
End: 2020-09-22
Payer: MEDICARE

## 2020-09-22 VITALS
RESPIRATION RATE: 16 BRPM | DIASTOLIC BLOOD PRESSURE: 70 MMHG | WEIGHT: 169 LBS | OXYGEN SATURATION: 97 % | HEIGHT: 61 IN | BODY MASS INDEX: 31.91 KG/M2 | SYSTOLIC BLOOD PRESSURE: 130 MMHG | HEART RATE: 99 BPM | TEMPERATURE: 97.4 F

## 2020-09-22 PROBLEM — Z12.31 SCREENING MAMMOGRAM FOR HIGH-RISK PATIENT: Status: ACTIVE | Noted: 2020-09-22

## 2020-09-22 PROBLEM — K21.9 GASTROESOPHAGEAL REFLUX DISEASE: Status: ACTIVE | Noted: 2020-09-22

## 2020-09-22 LAB — HBA1C MFR BLD: 8.5 %

## 2020-09-22 PROCEDURE — 99214 OFFICE O/P EST MOD 30 MIN: CPT | Performed by: INTERNAL MEDICINE

## 2020-09-22 PROCEDURE — 3052F HG A1C>EQUAL 8.0%<EQUAL 9.0%: CPT | Performed by: INTERNAL MEDICINE

## 2020-09-22 PROCEDURE — 83036 HEMOGLOBIN GLYCOSYLATED A1C: CPT | Performed by: INTERNAL MEDICINE

## 2020-09-22 RX ORDER — EZETIMIBE 10 MG/1
10 TABLET ORAL DAILY
Qty: 90 TABLET | Refills: 3 | Status: SHIPPED
Start: 2020-09-22 | End: 2020-11-18 | Stop reason: SDUPTHER

## 2020-09-22 RX ORDER — LANSOPRAZOLE 30 MG/1
30 CAPSULE, DELAYED RELEASE ORAL DAILY
Qty: 90 CAPSULE | Refills: 3 | Status: SHIPPED
Start: 2020-09-22 | End: 2020-11-18 | Stop reason: SDUPTHER

## 2020-09-22 RX ORDER — GLIMEPIRIDE 1 MG/1
1 TABLET ORAL 2 TIMES DAILY
Qty: 180 TABLET | Refills: 3 | Status: SHIPPED
Start: 2020-09-22 | End: 2020-11-18 | Stop reason: SDUPTHER

## 2020-09-22 RX ORDER — CYCLOBENZAPRINE HCL 5 MG
5 TABLET ORAL 2 TIMES DAILY PRN
Qty: 20 TABLET | Refills: 0 | Status: SHIPPED | OUTPATIENT
Start: 2020-09-22 | End: 2020-10-02

## 2020-09-22 RX ORDER — AMLODIPINE BESYLATE 10 MG/1
10 TABLET ORAL DAILY
Qty: 90 TABLET | Refills: 3 | Status: SHIPPED
Start: 2020-09-22 | End: 2020-11-18 | Stop reason: SDUPTHER

## 2020-09-22 RX ORDER — HYDROCODONE BITARTRATE AND ACETAMINOPHEN 5; 325 MG/1; MG/1
1 TABLET ORAL EVERY 12 HOURS PRN
Qty: 30 TABLET | Refills: 0 | Status: SHIPPED
Start: 2020-09-22 | End: 2020-11-18 | Stop reason: SDUPTHER

## 2020-09-22 RX ORDER — LEVOTHYROXINE SODIUM 0.05 MG/1
50 TABLET ORAL DAILY
Qty: 90 TABLET | Refills: 3 | Status: SHIPPED
Start: 2020-09-22 | End: 2021-08-31 | Stop reason: SDUPTHER

## 2020-09-22 ASSESSMENT — ENCOUNTER SYMPTOMS
STRIDOR: 0
EYE ITCHING: 0
ANAL BLEEDING: 0
ABDOMINAL PAIN: 0
WHEEZING: 0
BLOOD IN STOOL: 0
PHOTOPHOBIA: 0
RHINORRHEA: 0
BACK PAIN: 1
COLOR CHANGE: 0
EYE DISCHARGE: 0
NAUSEA: 0
FACIAL SWELLING: 0
EYE PAIN: 0
TROUBLE SWALLOWING: 0
COUGH: 0
CONSTIPATION: 1
SORE THROAT: 0
DIARRHEA: 0
SHORTNESS OF BREATH: 0
VOMITING: 0

## 2020-09-22 NOTE — ASSESSMENT & PLAN NOTE
Watch diet, watch for any low blood sugar spells. Record results of blood sugars in the diary and bring him back on her next office visit. Stay on Lantus 12 units she continuously takes that  midmorning depending upon what her  fasting blood sugar  Reads. May take glimepiride 1 mg with breakfast if her sugars are above 100. Bring blood sugar diary when she comes in next time    Hemoglobin A1c today was elevated at 8.5%.

## 2020-09-22 NOTE — PROGRESS NOTES
2020    Name: Lilo Oropeza : 1935 Sex: female  Age: 81 y.o. Subjective:  Chief Complaint   Patient presents with    3 Month Follow-Up        Urinary Tract Infection    Associated symptoms include frequency and urgency. Pertinent negatives include no flank pain, hematuria, nausea or vomiting. Back Pain   Associated symptoms include dysuria and numbness. Pertinent negatives include no abdominal pain, chest pain, headaches or weakness. Other   Associated symptoms include arthralgias, fatigue and numbness. Pertinent negatives include no abdominal pain, chest pain, congestion, coughing, headaches, joint swelling, myalgias, nausea, rash, sore throat, vomiting or weakness. This 81y.o. -year-old female  presents today for evaluation and management of her  chronic medical problems. Current medication list reviewed. The patient is tolerating all medications well without adverse events or known side effects. The patient does understand the risk and benefits of the prescribed medications      Patient was hospitalized recently at [facility] with a UTI and severe low back pain. X-ray showed herniated disc at L4-L5 with impression on L5 nerve root. She has a lot of pain so they did give her her Norco and also put her on fairly high-dose of steroids. Her back is a little better. She is staying at her daughter's house because her own home has stairs. She is undergoing physical therapy  at her daughter's home and when they feel she is safe to go up and down stairs then she can go back to her own home. She still complains of a little bit of dysuria. Urinalysis today showed some glucose and protein but no other sign of infection. We will go ahead and send it for culture. This showed mixed organisms less than 10,000 more consistent with colonization rather than infection. Since being on the steroids her blood sugars have been gone way up.   Even after the steroids are still aware of the addictive potential of this medication. She has tolerated this well with very few side effects. She has a known goiter but has normal TSH and free T4. She said the last time she was found to have an underactive thyroid she had to have a nuclear uptake and scan to prove this. .  Thyroid uptake was significantly below normal.  She had a left thyroid nodule about 1.9 cm and the recommendation was for thyroid biopsy. This was done and was negative for malignancy. .   She complains of fatigue, feels cold all the time, constipated, voice is a little hoarse. Interestingly her TSH and free T4 are normal.  We checked autoantibodies for thyroiditis and they were within normal limits . I opted to put her low-dose levothyroxine and recheck TSH in future. This was within normal limits. In June 2020 her hemoglobin A1c was 6.6%, her TSH, free T4 lipids were good, her BUN was 36 with a creatinine of 1.1 and estimated GFR of 47. Repeat liver enzymes were normal.  We  ordeed an ultrasound of her liver with elastography. This showed mildly enlarged liver with clinically significant fibrosis. . Does not want to go back to GI for further treatment. She had a history of pernicious anemia and B12 deficiency. She gets monthly B12 shots but her last B12 level was over 2000. We will hold on the B12 shots until I can recheck a level in about a month. Repeat level was about 500 so still within normal limits. In a few months of a recheck it limits down then we will restart her B12 shots  . She has a history of hyperlipidemia, hypertension, anxiety on buspirone, and sertraline. History of GERD    She would like to get her shingles shot. Will get her flu shot in October    Review of Systems   Constitutional: Positive for fatigue. Negative for appetite change and unexpected weight change. HENT: Negative for congestion, ear pain, facial swelling, rhinorrhea, sore throat, tinnitus and trouble swallowing. Hoarse voice   Eyes: Negative for photophobia, pain, discharge, itching and visual disturbance. Respiratory: Negative for cough, shortness of breath, wheezing and stridor. Cardiovascular: Negative for chest pain, palpitations and leg swelling. Gastrointestinal: Positive for constipation. Negative for abdominal pain, anal bleeding, blood in stool, diarrhea, nausea and vomiting. Endocrine: Positive for cold intolerance. Negative for heat intolerance, polydipsia, polyphagia and polyuria. Genitourinary: Positive for dysuria, frequency and urgency. Negative for difficulty urinating, flank pain and hematuria. See HPI   Musculoskeletal: Positive for arthralgias, back pain and gait problem. Negative for joint swelling and myalgias. Muscle spasms at night   Skin: Negative for color change, pallor and rash. Allergic/Immunologic: Negative for environmental allergies and food allergies. Neurological: Positive for numbness. Negative for dizziness, tremors, seizures, syncope, speech difficulty, weakness, light-headedness and headaches. Hematological: Negative for adenopathy. Does not bruise/bleed easily. Psychiatric/Behavioral: Negative for agitation, behavioral problems, confusion, sleep disturbance and suicidal ideas. The patient is nervous/anxious.            Current Outpatient Medications:     amLODIPine (NORVASC) 10 MG tablet, Take 1 tablet by mouth daily, Disp: 90 tablet, Rfl: 3    levothyroxine (SYNTHROID) 50 MCG tablet, Take 1 tablet by mouth Daily Take 1 tab on Mon-Fri, not on Sat or Sun., Disp: 90 tablet, Rfl: 3    lansoprazole (PREVACID) 30 MG delayed release capsule, Take 1 capsule by mouth daily, Disp: 90 capsule, Rfl: 3    glimepiride (AMARYL) 1 MG tablet, Take 1 tablet by mouth 2 times daily, Disp: 180 tablet, Rfl: 3    ezetimibe (ZETIA) 10 MG tablet, Take 1 tablet by mouth daily, Disp: 90 tablet, Rfl: 3    diclofenac sodium (VOLTAREN) 1 % GEL, Apply 2 g topically 2 times daily, Disp: 3 Tube, Rfl: 5    HYDROcodone-acetaminophen (NORCO) 5-325 MG per tablet, Take 1 tablet by mouth every 12 hours as needed for Pain for up to 7 days. , Disp: 30 tablet, Rfl: 0    cyclobenzaprine (FLEXERIL) 5 MG tablet, Take 1 tablet by mouth 2 times daily as needed for Muscle spasms, Disp: 20 tablet, Rfl: 0    blood glucose test strips (ASCENSIA AUTODISC VI;ONE TOUCH ULTRA TEST VI) strip, 1 each by In Vitro route 2 times daily as needed (Symptoms of low blood sugar), Disp: 100 each, Rfl: 5    Lancets 28G MISC, Test blood sugar twice daily and as needed for symptoms of low blood sugar., Disp: 100 each, Rfl: 5    insulin glargine (LANTUS SOLOSTAR) 100 UNIT/ML injection pen, Inject 12 Units into the skin daily, Disp: 5 pen, Rfl: 3    oxybutynin (DITROPAN-XL) 10 MG extended release tablet, Take 10 mg by mouth daily, Disp: , Rfl:     Blood Glucose Monitoring Suppl (BLOOD GLUCOSE MONITOR SYSTEM) w/Device KIT, Test blood sugar twice daily, and as needed for symptoms of low blood sugar.  (Patient is insulin dependant.), Disp: 1 kit, Rfl: 0    Insulin Pen Needle (B-D ULTRAFINE III SHORT PEN) 31G X 8 MM MISC, Inject 1 each as directed daily BD Ultra Fine, Disp: 100 each, Rfl: 2     Allergies   Allergen Reactions    Ibuprofen     Lisinopril      Other reaction(s): Cough    Nsaids Other (See Comments)    Pregabalin Swelling    Simvastatin     Statins Other (See Comments)     Muscle weakness         Past Medical History:   Diagnosis Date    Anemia     Chronic kidney disease     Stage III    Chronic pain     Cirrhosis (HCC)     Depression     Fibromyalgia     Gastroparesis     Pernicious anemia     Thyroid nodule        Health Maintenance Due   Topic Date Due    Shingles Vaccine (2 of 3) 08/06/2014    Flu vaccine (1) 09/01/2020        Patient Active Problem List   Diagnosis    Essential hypertension    Slow transit constipation    Type 2 diabetes mellitus (HCC)    Gastroparesis    Chronic pain    Fibromyalgia    Thyroid nodule    Pernicious anemia    Hypothyroidism    Abnormal liver enzymes    Bladder prolapse, female, acquired    Urge incontinence    Fatty liver    Depression    Mixed hyperlipidemia    Chronic kidney disease    Dysuria    Overactive bladder    Herniated lumbar intervertebral disc    Left lumbar radiculopathy    Gastroesophageal reflux disease    Screening mammogram for high-risk patient    Muscle spasm        Past Surgical History:   Procedure Laterality Date    APPENDECTOMY      CHOLECYSTECTOMY      EYE SURGERY      cataract OU    HEMORRHOID SURGERY      HYSTERECTOMY, TOTAL ABDOMINAL      JOINT REPLACEMENT Left     Knee     KNEE ARTHROPLASTY Right     SUBTOTAL COLECTOMY          Family History   Problem Relation Age of Onset    Other Mother         cva    High Blood Pressure Mother     Other Father         heart disease         Social History     Tobacco Use    Smoking status: Never Smoker    Smokeless tobacco: Never Used   Substance Use Topics    Alcohol use: Not Currently    Drug use: Yes     Comment: Norco 5/325        Objective  Vitals:    09/22/20 1029   BP: 130/70   Pulse: 99   Resp: 16   Temp: 97.4 °F (36.3 °C)   SpO2: 97%   Weight: 169 lb (76.7 kg)   Height: 5' 1\" (1.549 m)        Exam:  Physical Exam  Constitutional:       General: She is not in acute distress. Appearance: Normal appearance. She is well-developed. She is not ill-appearing. HENT:      Head: Normocephalic. Right Ear: External ear normal.      Left Ear: External ear normal.   Eyes:      General: No scleral icterus. Right eye: No discharge. Left eye: No discharge. Extraocular Movements: Extraocular movements intact. Conjunctiva/sclera: Conjunctivae normal.      Pupils: Pupils are equal, round, and reactive to light. Neck:      Musculoskeletal: Normal range of motion and neck supple. Thyroid: Thyromegaly present.       Comments: Large Medications    lansoprazole (PREVACID) 30 MG delayed release capsule       Endocrine    Type 2 diabetes mellitus ([facility] [address] - Primary     Watch diet, watch for any low blood sugar spells. Record results of blood sugars in the diary and bring him back on her next office visit. Stay on Lantus 12 units she continuously takes that  midmorning depending upon what her  fasting blood sugar  Reads. May take glimepiride 1 mg with breakfast if her sugars are above 100. Bring blood sugar diary when she comes in next time    Hemoglobin A1c today was elevated at 8.5%. Relevant Medications    Insulin Pen Needle (B-D ULTRAFINE III SHORT PEN) 31G X 8 MM MISC    Blood Glucose Monitoring Suppl (BLOOD GLUCOSE MONITOR SYSTEM) w/Device KIT    blood glucose test strips (ASCENSIA AUTODISC VI;ONE TOUCH ULTRA TEST VI) strip    Lancets 28G MISC    insulin glargine (LANTUS SOLOSTAR) 100 UNIT/ML injection pen    glimepiride (AMARYL) 1 MG tablet    Other Relevant Orders    POCT glycosylated hemoglobin (Hb A1C) (Completed)    Hypothyroidism     Continue levothyroxine 50 mcg a day         Relevant Medications    levothyroxine (SYNTHROID) 50 MCG tablet       Nervous and Auditory    Left lumbar radiculopathy     Continue Norco as needed         Relevant Medications    cyclobenzaprine (FLEXERIL) 5 MG tablet       Other    Chronic pain     Continue Norco as needed. Relevant Medications    HYDROcodone-acetaminophen (NORCO) 5-325 MG per tablet    cyclobenzaprine (FLEXERIL) 5 MG tablet    Mixed hyperlipidemia    Relevant Medications    amLODIPine (NORVASC) 10 MG tablet    ezetimibe (ZETIA) 10 MG tablet    Screening mammogram for high-risk patient     Send mammogram requisition to [facility]         Relevant Orders    LAURA DIGITAL SCREEN W OR WO CAD BILATERAL    Muscle spasm     Continue Flexeril 5 mg. She has tried other muscle relaxants like tizanidine which did not help.          Relevant Medications    cyclobenzaprine (FLEXERIL) 5 MG tablet           Return in about 2 months (around 11/22/2020), or AWV and office visit , be fasting.        Kindra Merlos,   9/22/2020

## 2020-09-22 NOTE — ASSESSMENT & PLAN NOTE
Blood pressures are stable. Continue medications and monitor blood pressures at home. Call office if systolics are over 150 over diastolics over 90.

## 2020-11-03 ENCOUNTER — TELEPHONE (OUTPATIENT)
Dept: ADMINISTRATIVE | Age: 85
End: 2020-11-03

## 2020-11-03 ENCOUNTER — NURSE TRIAGE (OUTPATIENT)
Dept: OTHER | Facility: CLINIC | Age: 85
End: 2020-11-03

## 2020-11-03 ENCOUNTER — TELEPHONE (OUTPATIENT)
Dept: PRIMARY CARE CLINIC | Age: 85
End: 2020-11-03

## 2020-11-03 NOTE — TELEPHONE ENCOUNTER
Call transfer from [facility], pt needs seen for right arm pain x 3 weeks, PCP unavailable for in office visit, pt not able to do VV. Pt was offered to be seen in [facility], pt wanted to end call had to get ready for another appt. Pt did say she wanted to see if Dr Lorne Zarate could send an Rx for a  Steroid. [facility] unable to get much information from pt, she was in a hurry to end the call. Please contact her concerning this matter.   [phone]

## 2020-11-03 NOTE — TELEPHONE ENCOUNTER
Call from [facility], needs seen for right arm pain, PCP only available for a VV, pt unable to do a virtual visit, [facility] offered [address], pt declined, was in a hurry to end call, [facility] did send a PE to PCP:.

## 2020-11-12 ENCOUNTER — OFFICE VISIT (OUTPATIENT)
Dept: FAMILY MEDICINE CLINIC | Age: 85
End: 2020-11-12
Payer: MEDICARE

## 2020-11-12 VITALS
BODY MASS INDEX: 30.4 KG/M2 | HEART RATE: 98 BPM | HEIGHT: 61 IN | WEIGHT: 161 LBS | DIASTOLIC BLOOD PRESSURE: 78 MMHG | OXYGEN SATURATION: 99 % | SYSTOLIC BLOOD PRESSURE: 132 MMHG

## 2020-11-12 PROCEDURE — 99213 OFFICE O/P EST LOW 20 MIN: CPT | Performed by: INTERNAL MEDICINE

## 2020-11-12 NOTE — PROGRESS NOTES
2020   Express care    Bindu Kolb (:  1935) is a 81 y.o. female, presents to express care with a recent pain in the right arm makes it difficult to even AB duct this. She states she is then followed up for cervical disc disease and did receive physical therapy. She thinks that the pain of the right arm is coming from the cervical spine. Chief Complaint   Patient presents with    Arm Pain     Rt. From shoulder to below elbow. Pt lifted had a herniated disc from lifting a trash bag and thinks this is from that. Has had pt     Numbness     fingers          Review of Systems    Prior to Visit Medications    Medication Sig Taking? Authorizing Provider   amLODIPine (NORVASC) 10 MG tablet Take 1 tablet by mouth daily Yes Queta Mckoy DO   levothyroxine (SYNTHROID) 50 MCG tablet Take 1 tablet by mouth Daily Take 1 tab on Mon-Fri, not on Sat or Sun. Yes Queta Mckoy DO   lansoprazole (PREVACID) 30 MG delayed release capsule Take 1 capsule by mouth daily Yes Queta Mckoy DO   glimepiride (AMARYL) 1 MG tablet Take 1 tablet by mouth 2 times daily Yes Queta Mckoy DO   ezetimibe (ZETIA) 10 MG tablet Take 1 tablet by mouth daily Yes Queta Mckoy DO   diclofenac sodium (VOLTAREN) 1 % GEL Apply 2 g topically 2 times daily Yes Queta Mckoy DO   blood glucose test strips (ASCENSIA AUTODISC VI;ONE TOUCH ULTRA TEST VI) strip 1 each by In Vitro route 2 times daily as needed (Symptoms of low blood sugar) Yes Queta Mckoy DO   Lancets 28G MISC Test blood sugar twice daily and as needed for symptoms of low blood sugar.  Yes Queta Mckoy DO   insulin glargine (LANTUS SOLOSTAR) 100 UNIT/ML injection pen Inject 12 Units into the skin daily Yes Queta Mckoy DO   oxybutynin (DITROPAN-XL) 10 MG extended release tablet Take 10 mg by mouth daily Yes Historical Provider, MD   Blood Glucose Monitoring Suppl (BLOOD GLUCOSE MONITOR SYSTEM) w/Device KIT Test blood sugar twice daily, and as needed for symptoms of low blood sugar. (Patient is insulin dependant.) Yes Queta Mckoy, DO   Insulin Pen Needle (B-D ULTRAFINE III SHORT PEN) 31G X 8 MM MISC Inject 1 each as directed daily BD Ultra Fine Yes Queta Mckoy, DO   HYDROcodone-acetaminophen (NORCO) 5-325 MG per tablet Take 1 tablet by mouth every 12 hours as needed for Pain for up to 7 days. Queta Mckoy, DO      Allergies   Allergen Reactions    Ibuprofen     Lisinopril      Other reaction(s): Cough    Nsaids Other (See Comments)    Pregabalin Swelling    Simvastatin     Statins Other (See Comments)     Muscle weakness        Past Medical History:   Diagnosis Date    Anemia     Chronic kidney disease     Stage III    Chronic pain     Cirrhosis (HCC)     Depression     Fibromyalgia     Gastroparesis     Pernicious anemia     Thyroid nodule      Past Surgical History:   Procedure Laterality Date    APPENDECTOMY      CHOLECYSTECTOMY      EYE SURGERY      cataract OU    HEMORRHOID SURGERY      HYSTERECTOMY, TOTAL ABDOMINAL      JOINT REPLACEMENT Left     Knee     KNEE ARTHROPLASTY Right     SUBTOTAL COLECTOMY        Social History     Tobacco Use    Smoking status: Never Smoker    Smokeless tobacco: Never Used   Substance Use Topics    Alcohol use: Not Currently        Vitals:    11/12/20 1141   BP: 132/78   Pulse: 98   SpO2: 99%   Weight: 161 lb (73 kg)   Height: 5' 1\" (1.549 m)     Estimated body mass index is 30.42 kg/m² as calculated from the following:    Height as of this encounter: 5' 1\" (1.549 m). Weight as of this encounter: 161 lb (73 kg). Physical Exam  Musculoskeletal:      Right lower leg: No edema. Left lower leg: No edema. Comments: Passive range of motion of the right shoulder is intact for the patient's age. However she is unable to AB duct this. Her strength is bilaterally strong and intact. Cervical rotation is normal for her age. No rash of the skin noted.  On palpation I am able to reproduce her symptoms on a trigger point in the right trapezius. This is approximately 2 cm right lateral of midline. ASSESSMENT/PLAN:  There are no diagnoses linked to this encounter. She is already tried Voltaren gel but says it does not work. I told her however to continue using it as I do not have any additional options at this time. I will not give her shot into this area of the trapezius. She cannot use NSAIDs due to her renal disease and no oral steroid therapy due to her diabetes. I recommended that she have at least the cervical x-rays performed and then she can talk with her family physician about additional treatments for this. I apologized to the patient as there is really nothing additional I can add to help her with her discomfort. An electronic signature was used to authenticate this note.     --Claritza Wharton,  on 11/12/2020 at 11:58 AM

## 2020-11-18 ENCOUNTER — OFFICE VISIT (OUTPATIENT)
Dept: PRIMARY CARE CLINIC | Age: 85
End: 2020-11-18
Payer: MEDICARE

## 2020-11-18 VITALS
HEIGHT: 61 IN | HEART RATE: 71 BPM | WEIGHT: 160 LBS | BODY MASS INDEX: 30.21 KG/M2 | DIASTOLIC BLOOD PRESSURE: 64 MMHG | OXYGEN SATURATION: 97 % | SYSTOLIC BLOOD PRESSURE: 132 MMHG | TEMPERATURE: 97.5 F

## 2020-11-18 PROBLEM — M25.511 ACUTE PAIN OF RIGHT SHOULDER: Status: ACTIVE | Noted: 2020-11-18

## 2020-11-18 PROCEDURE — 99214 OFFICE O/P EST MOD 30 MIN: CPT | Performed by: INTERNAL MEDICINE

## 2020-11-18 PROCEDURE — 90694 VACC AIIV4 NO PRSRV 0.5ML IM: CPT | Performed by: INTERNAL MEDICINE

## 2020-11-18 PROCEDURE — 3052F HG A1C>EQUAL 8.0%<EQUAL 9.0%: CPT | Performed by: INTERNAL MEDICINE

## 2020-11-18 PROCEDURE — G0008 ADMIN INFLUENZA VIRUS VAC: HCPCS | Performed by: INTERNAL MEDICINE

## 2020-11-18 RX ORDER — INSULIN GLARGINE 100 [IU]/ML
12 INJECTION, SOLUTION SUBCUTANEOUS DAILY
Qty: 5 PEN | Refills: 3 | Status: SHIPPED
Start: 2020-11-18 | End: 2021-04-28 | Stop reason: SDUPTHER

## 2020-11-18 RX ORDER — CYCLOBENZAPRINE HCL 5 MG
5 TABLET ORAL 2 TIMES DAILY PRN
COMMUNITY
End: 2020-11-18 | Stop reason: SDUPTHER

## 2020-11-18 RX ORDER — GLIMEPIRIDE 1 MG/1
1 TABLET ORAL 2 TIMES DAILY
Qty: 180 TABLET | Refills: 3 | Status: SHIPPED
Start: 2020-11-18 | End: 2021-08-31 | Stop reason: SDUPTHER

## 2020-11-18 RX ORDER — CYCLOBENZAPRINE HCL 5 MG
5 TABLET ORAL 2 TIMES DAILY PRN
Qty: 20 TABLET | Refills: 3 | Status: SHIPPED
Start: 2020-11-18 | End: 2022-01-31 | Stop reason: ALTCHOICE

## 2020-11-18 RX ORDER — LANCETS 23 GAUGE
EACH MISCELLANEOUS
Qty: 100 EACH | Refills: 5 | Status: SHIPPED
Start: 2020-11-18 | End: 2021-04-28 | Stop reason: SDUPTHER

## 2020-11-18 RX ORDER — HYDROCODONE BITARTRATE AND ACETAMINOPHEN 5; 325 MG/1; MG/1
1 TABLET ORAL EVERY 12 HOURS PRN
Qty: 30 TABLET | Refills: 0 | Status: SHIPPED
Start: 2020-11-18 | End: 2021-04-28 | Stop reason: SDUPTHER

## 2020-11-18 RX ORDER — EZETIMIBE 10 MG/1
10 TABLET ORAL DAILY
Qty: 90 TABLET | Refills: 3 | Status: SHIPPED
Start: 2020-11-18 | End: 2021-08-31 | Stop reason: SDUPTHER

## 2020-11-18 RX ORDER — HYDROCODONE BITARTRATE AND ACETAMINOPHEN 5; 325 MG/1; MG/1
1 TABLET ORAL EVERY 12 HOURS PRN
Qty: 30 TABLET | Refills: 0 | Status: CANCELLED | OUTPATIENT
Start: 2020-11-18 | End: 2020-11-25

## 2020-11-18 RX ORDER — LANSOPRAZOLE 30 MG/1
30 CAPSULE, DELAYED RELEASE ORAL DAILY
Qty: 90 CAPSULE | Refills: 3 | Status: SHIPPED
Start: 2020-11-18 | End: 2021-08-31 | Stop reason: SDUPTHER

## 2020-11-18 RX ORDER — AMLODIPINE BESYLATE 10 MG/1
10 TABLET ORAL DAILY
Qty: 90 TABLET | Refills: 3 | Status: SHIPPED
Start: 2020-11-18 | End: 2021-08-31 | Stop reason: SDUPTHER

## 2020-11-18 RX ORDER — GABAPENTIN 100 MG/1
100 CAPSULE ORAL NIGHTLY
Qty: 30 CAPSULE | Refills: 0 | Status: SHIPPED
Start: 2020-11-18 | End: 2020-12-15 | Stop reason: ALTCHOICE

## 2020-11-18 ASSESSMENT — ENCOUNTER SYMPTOMS
RHINORRHEA: 0
SHORTNESS OF BREATH: 0
FACIAL SWELLING: 0
EYE ITCHING: 0
EYE DISCHARGE: 0
STRIDOR: 0
BACK PAIN: 1
NAUSEA: 0
EYE PAIN: 0
ABDOMINAL PAIN: 0
DIARRHEA: 0
CONSTIPATION: 1
ANAL BLEEDING: 0
PHOTOPHOBIA: 0
COLOR CHANGE: 0
COUGH: 0
TROUBLE SWALLOWING: 0
VOMITING: 0
BLOOD IN STOOL: 0
SORE THROAT: 0
WHEEZING: 0

## 2020-11-18 NOTE — PROGRESS NOTES
2020    Name: Lilian Hendrickson : 1935 Sex: female  Age: 81 y.o. Subjective:  Chief Complaint   Patient presents with    Hypertension    Diabetes        Urinary Tract Infection    Associated symptoms include frequency and urgency. Pertinent negatives include no flank pain, hematuria, nausea or vomiting. Back Pain   Associated symptoms include numbness. Pertinent negatives include no abdominal pain, chest pain, dysuria, headaches or weakness. Other   Associated symptoms include arthralgias, fatigue and numbness. Pertinent negatives include no abdominal pain, chest pain, congestion, coughing, headaches, joint swelling, myalgias, nausea, rash, sore throat, vomiting or weakness. Hypertension   Pertinent negatives include no chest pain, headaches, palpitations or shortness of breath. Diabetes   Hypoglycemia symptoms include nervousness/anxiousness. Pertinent negatives for hypoglycemia include no confusion, dizziness, headaches, pallor, seizures, speech difficulty or tremors. Associated symptoms include fatigue. Pertinent negatives for diabetes include no chest pain, no polydipsia, no polyphagia, no polyuria and no weakness. This 81y.o. -year-old female  presents today for evaluation and management of her  chronic medical problems. Current medication list reviewed. The patient is tolerating all medications well without adverse events or known side effects. The patient does understand the risk and benefits of the prescribed medications    Patient was seen at [facility] care earlier with severe pain of her right arm and her right right side of her neck. X-ray showed degenerative disease of her spine. She cannot raise her right arm with a lot of pain around biceps area. I wonder if she has a combination of cervical radiculitis with rotator cuff dysfunction. She would like to see an orthopod.   I recommended a physiatrist.  She does not want to go to [facility] but she is willing to go to  [place] [facility] hospital if needed ,she would like to see Dr. Sheila Gomez first to check for rotator cuff dysfunction. Because she has a lot of  pain despite being on Norco we will start gabapentin 100 mg and increase dose slowly        She has a history of mixed incontinence and was referred initially to Dr. Ramesh Montoya. Reviewed his consult. He felt that she should see a uro-gynecologist and referred her to Dr. Peter Sales who did her for surgery at [facility] in [facility] on February 4, 2020. Reviewed his records. She says she still has problems with  mixed incontinence but he tells it will  get better she has recurrent urgency and overflow incontinence. She will be seeing him in September 2020. Raphael Kelley She had an InterStim trial which she failed. She had severe leg pain after the device was removed. This has improved. .    No previous history of cardiac disease. She was admitted in August 2018 with some palpitations and underwent cardiac evaluation . She  had a stress test which was negative for ischemia. She also had an echocardiogram..  She denies any chest pain, chest pressure, palpitations, dyspnea or lightheadedness. Patient has a history of chronic pain syndrome. She has pain \"all over\". Mainly her knees. She has a lot of muscle spasms which keep her up at night. She would like to try Flexeril again. She tried Tizanidine and other anti-spasm medications and this did not help. Only Flexeril helped her. We discussed the fact that this was a problematic medication for people over 65. She says that if it works for her she wants to take it. . She tried Voltaren gel and it did help for a while. [place] report reviewed and the last Norco that she got was 30 pills on 9/22/2020. Raphael Helmsodore She is well aware of the addictive potential of this medication. She has tolerated this well with very few side effects. She has a known goiter but has normal TSH and free T4.   She said the Negative for cough, shortness of breath, wheezing and stridor. Cardiovascular: Negative for chest pain, palpitations and leg swelling. Gastrointestinal: Positive for constipation. Negative for abdominal pain, anal bleeding, blood in stool, diarrhea, nausea and vomiting. Endocrine: Positive for cold intolerance. Negative for heat intolerance, polydipsia, polyphagia and polyuria. Genitourinary: Positive for frequency and urgency. Negative for difficulty urinating, dysuria, flank pain and hematuria. See HPI   Musculoskeletal: Positive for arthralgias, back pain and gait problem. Negative for joint swelling and myalgias. See hpi   Skin: Negative for color change, pallor and rash. Allergic/Immunologic: Negative for environmental allergies and food allergies. Neurological: Positive for numbness. Negative for dizziness, tremors, seizures, syncope, speech difficulty, weakness, light-headedness and headaches. Hematological: Negative for adenopathy. Does not bruise/bleed easily. Psychiatric/Behavioral: Negative for agitation, behavioral problems, confusion, sleep disturbance and suicidal ideas. The patient is nervous/anxious.            Current Outpatient Medications:     amLODIPine (NORVASC) 10 MG tablet, Take 1 tablet by mouth daily, Disp: 90 tablet, Rfl: 3    ezetimibe (ZETIA) 10 MG tablet, Take 1 tablet by mouth daily, Disp: 90 tablet, Rfl: 3    cyclobenzaprine (FLEXERIL) 5 MG tablet, Take 1 tablet by mouth 2 times daily as needed for Muscle spasms, Disp: 20 tablet, Rfl: 3    glimepiride (AMARYL) 1 MG tablet, Take 1 tablet by mouth 2 times daily, Disp: 180 tablet, Rfl: 3    lansoprazole (PREVACID) 30 MG delayed release capsule, Take 1 capsule by mouth daily, Disp: 90 capsule, Rfl: 3    Lancets 28G MISC, Test blood sugar twice daily and as needed for symptoms of low blood sugar., Disp: 100 each, Rfl: 5    insulin glargine (LANTUS SOLOSTAR) 100 UNIT/ML injection pen, Inject 12 Units into the skin daily, Disp: 5 pen, Rfl: 3    HYDROcodone-acetaminophen (NORCO) 5-325 MG per tablet, Take 1 tablet by mouth every 12 hours as needed for Pain for up to 7 days. , Disp: 30 tablet, Rfl: 0    gabapentin (NEURONTIN) 100 MG capsule, Take 1 capsule by mouth nightly for 30 days. Intended supply: 30 days, Disp: 30 capsule, Rfl: 0    levothyroxine (SYNTHROID) 50 MCG tablet, Take 1 tablet by mouth Daily Take 1 tab on Mon-Fri, not on Sat or Sun., Disp: 90 tablet, Rfl: 3    diclofenac sodium (VOLTAREN) 1 % GEL, Apply 2 g topically 2 times daily, Disp: 3 Tube, Rfl: 5    blood glucose test strips (ASCENSIA AUTODISC VI;ONE TOUCH ULTRA TEST VI) strip, 1 each by In Vitro route 2 times daily as needed (Symptoms of low blood sugar), Disp: 100 each, Rfl: 5    oxybutynin (DITROPAN-XL) 10 MG extended release tablet, Take 10 mg by mouth daily, Disp: , Rfl:     Blood Glucose Monitoring Suppl (BLOOD GLUCOSE MONITOR SYSTEM) w/Device KIT, Test blood sugar twice daily, and as needed for symptoms of low blood sugar.  (Patient is insulin dependant.), Disp: 1 kit, Rfl: 0    Insulin Pen Needle (B-D ULTRAFINE III SHORT PEN) 31G X 8 MM MISC, Inject 1 each as directed daily BD Ultra Fine, Disp: 100 each, Rfl: 2     Allergies   Allergen Reactions    Ibuprofen     Lisinopril      Other reaction(s): Cough    Nsaids Other (See Comments)    Pregabalin Swelling    Simvastatin     Statins Other (See Comments)     Muscle weakness         Past Medical History:   Diagnosis Date    Anemia     Chronic kidney disease     Stage III    Chronic pain     Cirrhosis (HCC)     Depression     Fibromyalgia     Gastroparesis     Pernicious anemia     Thyroid nodule        Health Maintenance Due   Topic Date Due    Shingles Vaccine (2 of 3) 08/06/2014    Annual Wellness Visit (AWV)  11/19/2020        Patient Active Problem List   Diagnosis    Essential hypertension    Slow transit constipation    Type 2 diabetes mellitus ([facility] [place] [address]    Gastroparesis    Chronic pain    Fibromyalgia    Thyroid nodule    Pernicious anemia    Hypothyroidism    Abnormal liver enzymes    Bladder prolapse, female, acquired    Urge incontinence    Fatty liver    Depression    Mixed hyperlipidemia    Chronic kidney disease    Dysuria    Overactive bladder    Herniated lumbar intervertebral disc    Left lumbar radiculopathy    Gastroesophageal reflux disease    Screening mammogram for high-risk patient    Muscle spasm    Acute pain of right shoulder    Vitamin B12 deficiency    Cervical radiculitis        Past Surgical History:   Procedure Laterality Date    APPENDECTOMY      CHOLECYSTECTOMY      EYE SURGERY      cataract OU    HEMORRHOID SURGERY      HYSTERECTOMY, TOTAL ABDOMINAL      JOINT REPLACEMENT Left     Knee     KNEE ARTHROPLASTY Right     SUBTOTAL COLECTOMY          Family History   Problem Relation Age of Onset    Other Mother         cva    High Blood Pressure Mother     Other Father         heart disease         Social History     Tobacco Use    Smoking status: Never Smoker    Smokeless tobacco: Never Used   Substance Use Topics    Alcohol use: Not Currently    Drug use: Yes     Comment: Norco 5/325        Objective  Vitals:    11/18/20 0858   BP: 132/64   Site: Right Upper Arm   Position: Sitting   Cuff Size: Medium Adult   Pulse: 71   Temp: 97.5 °F (36.4 °C)   TempSrc: Temporal   SpO2: 97%   Weight: 160 lb (72.6 kg)   Height: 5' 1\" (1.549 m)        Exam:  Physical Exam  Constitutional:       General: She is not in acute distress. Appearance: Normal appearance. She is well-developed. She is not ill-appearing. HENT:      Head: Normocephalic. Right Ear: External ear normal.      Left Ear: External ear normal.   Eyes:      General: No scleral icterus. Right eye: No discharge. Left eye: No discharge. Extraocular Movements: Extraocular movements intact.       Conjunctiva/sclera: Conjunctivae normal. Pupils: Pupils are equal, round, and reactive to light. Neck:      Musculoskeletal: Normal range of motion and neck supple. Thyroid: Thyromegaly present. Comments: Large left thyroid nodule  Cardiovascular:      Rate and Rhythm: Normal rate and regular rhythm. Heart sounds: Normal heart sounds. No murmur. No friction rub. No gallop. Pulmonary:      Effort: Pulmonary effort is normal. No respiratory distress. Breath sounds: Normal breath sounds. No wheezing or rales. Chest:      Chest wall: No tenderness. Abdominal:      General: Bowel sounds are normal. There is no distension. Palpations: Abdomen is soft. There is no mass. Tenderness: There is no abdominal tenderness. There is no guarding or rebound. Musculoskeletal:         General: Tenderness present. No deformity. Comments: Tender to palpation both knees. 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 Tenderness to palpation lower lumbar spine with decreased range of motion to flexion    Tender to palpation right biceps. Unable to raise her arm above 45 degrees. Unable to internal or externally rotate her shoulder. She also has tenderness with palpation of her cervical spine area on the right side. Lymphadenopathy:      Cervical: No cervical adenopathy. Skin:     General: Skin is warm and dry. Coloration: Skin is not pale. Findings: No erythema or rash. Neurological:      Mental Status: She is alert and oriented to person, place, and time. Cranial Nerves: No cranial nerve deficit. Sensory: No sensory deficit. Deep Tendon Reflexes: Reflexes normal.      Comments: Decreased sensation to light touch below the knee left more so than right   Psychiatric:         Mood and Affect: Mood normal.         Behavior: Behavior normal.         Thought Content:  Thought content normal.         Judgment: Judgment normal.          Last labs reviewed. ASSESSMENT & PLAN :   Problem List        Circulatory    Essential hypertension     Blood pressures are stable. Continue medications and monitor blood pressures at home. Call office if systolics are over 898 over diastolics over 90. Relevant Medications    amLODIPine (NORVASC) 10 MG tablet    Other Relevant Orders    Comprehensive Metabolic Panel       Digestive    Gastroesophageal reflux disease     Continue PPI, monitor kidney function and magnesium level         Relevant Medications    lansoprazole (PREVACID) 30 MG delayed release capsule       Endocrine    Type 2 diabetes mellitus ([facility] [address] - Primary     Watch blood sugars, watch diet. Check hemoglobin A1c, continue glimepiride and Lantus.   She has no low blood sugar spells         Relevant Medications    Insulin Pen Needle (B-D ULTRAFINE III SHORT PEN) 31G X 8 MM MISC    Blood Glucose Monitoring Suppl (BLOOD GLUCOSE MONITOR SYSTEM) w/Device KIT    blood glucose test strips (ASCENSIA AUTODISC VI;ONE TOUCH ULTRA TEST VI) strip    glimepiride (AMARYL) 1 MG tablet    Lancets 28G MISC    insulin glargine (LANTUS SOLOSTAR) 100 UNIT/ML injection pen    Other Relevant Orders    Comprehensive Metabolic Panel    Microalbumin / Creatinine Urine Ratio    Hypothyroidism     Continue levothyroxine, monitor TSH and free T4         Relevant Medications    levothyroxine (SYNTHROID) 50 MCG tablet    Other Relevant Orders    T4, Free    TSH without Reflex       Nervous and Auditory    Cervical radiculitis     Trial gabapentin 100 mg increasing to 300 mg over 3 weeks         Relevant Medications    cyclobenzaprine (FLEXERIL) 5 MG tablet    gabapentin (NEURONTIN) 100 MG capsule       Genitourinary    Chronic kidney disease     Avoid NSAID use and monitor blood work         Relevant Orders    CBC Auto Differential       Other    Chronic pain     Continue Norco         Relevant Medications    cyclobenzaprine (FLEXERIL) 5 MG tablet    HYDROcodone-acetaminophen (Willena Bud) 5-325 MG per tablet    gabapentin (NEURONTIN) 100 MG capsule    Other Relevant Orders    CBC Auto Differential    Abnormal liver enzymes     Check hepatic panel         Relevant Orders    Comprehensive Metabolic Panel    Mixed hyperlipidemia     Watch saturated fats in diet and will monitor lipids         Relevant Medications    amLODIPine (NORVASC) 10 MG tablet    ezetimibe (ZETIA) 10 MG tablet    Other Relevant Orders    Comprehensive Metabolic Panel    Lipid Panel    Acute pain of right shoulder     Continue [place] and sent to Dr. Neil Pollard         Relevant Medications    cyclobenzaprine (FLEXERIL) 5 MG tablet    Other Relevant Orders    Amb External Referral To Orthopedic Surgery    Vitamin B12 deficiency     Check vitamin B12 level         Relevant Orders    VITAMIN B12    RESOLVED: Need for influenza vaccination           Return in about 4 weeks (around 12/16/2020), or CAROLYN Robles, DO  11/18/2020

## 2020-11-18 NOTE — ASSESSMENT & PLAN NOTE
Blood pressures are stable. Continue medications and monitor blood pressures at home. Call office if systolics are over 653 over diastolics over 90.

## 2020-11-18 NOTE — ASSESSMENT & PLAN NOTE
Watch blood sugars, watch diet. Check hemoglobin A1c, continue glimepiride and Lantus.   She has no low blood sugar spells

## 2020-12-15 ENCOUNTER — OFFICE VISIT (OUTPATIENT)
Dept: PRIMARY CARE CLINIC | Age: 85
End: 2020-12-15
Payer: MEDICARE

## 2020-12-15 VITALS
SYSTOLIC BLOOD PRESSURE: 128 MMHG | OXYGEN SATURATION: 97 % | WEIGHT: 160 LBS | BODY MASS INDEX: 30.21 KG/M2 | HEART RATE: 77 BPM | HEIGHT: 61 IN | DIASTOLIC BLOOD PRESSURE: 78 MMHG | TEMPERATURE: 97.2 F

## 2020-12-15 PROCEDURE — G0439 PPPS, SUBSEQ VISIT: HCPCS | Performed by: INTERNAL MEDICINE

## 2020-12-15 RX ORDER — GUAIFENESIN 600 MG/1
TABLET, EXTENDED RELEASE ORAL
COMMUNITY
Start: 2020-08-18 | End: 2021-12-23 | Stop reason: ALTCHOICE

## 2020-12-15 RX ORDER — ASCORBATE CALCIUM 500 MG
500 TABLET ORAL DAILY
COMMUNITY
Start: 2020-08-18

## 2020-12-15 RX ORDER — AMOXICILLIN 500 MG/1
500 CAPSULE ORAL 3 TIMES DAILY
Qty: 21 CAPSULE | Refills: 0 | Status: SHIPPED | OUTPATIENT
Start: 2020-12-15 | End: 2020-12-22

## 2020-12-15 RX ORDER — GABAPENTIN 100 MG/1
200 CAPSULE ORAL NIGHTLY
Qty: 60 CAPSULE | Refills: 5 | Status: SHIPPED
Start: 2020-12-15 | End: 2021-05-13

## 2020-12-15 ASSESSMENT — PATIENT HEALTH QUESTIONNAIRE - PHQ9
SUM OF ALL RESPONSES TO PHQ QUESTIONS 1-9: 0
SUM OF ALL RESPONSES TO PHQ QUESTIONS 1-9: 0
SUM OF ALL RESPONSES TO PHQ9 QUESTIONS 1 & 2: 0
2. FEELING DOWN, DEPRESSED OR HOPELESS: 0
SUM OF ALL RESPONSES TO PHQ QUESTIONS 1-9: 0
1. LITTLE INTEREST OR PLEASURE IN DOING THINGS: 0

## 2020-12-15 ASSESSMENT — LIFESTYLE VARIABLES: HOW OFTEN DO YOU HAVE A DRINK CONTAINING ALCOHOL: 0

## 2020-12-15 NOTE — PROGRESS NOTES
Medicare Annual Wellness Visit  Name: Marlon Lewis Date: 12/15/2020   MRN: <[id number]> Sex: Female   Age: 81 y.o. Ethnicity: Non-/Non    : 1935 Race: White      Corina Solorzano is here for Medicare AWV    Screenings for behavioral, psychosocial and functional/safety risks, and cognitive dysfunction are all negative except as indicated below. These results, as well as other patient data from the [address] form, are documented in Flowsheets linked to this Encounter. Allergies   Allergen Reactions    Ibuprofen     Lisinopril      Other reaction(s): Cough    Nsaids Other (See Comments)    Pregabalin Swelling    Simvastatin     Statins Other (See Comments)     Muscle weakness        Prior to Visit Medications    Medication Sig Taking? Authorizing Provider   amLODIPine (NORVASC) 10 MG tablet Take 1 tablet by mouth daily Yes Queta Mckoy DO   ezetimibe (ZETIA) 10 MG tablet Take 1 tablet by mouth daily Yes Queta Mckoy DO   cyclobenzaprine (FLEXERIL) 5 MG tablet Take 1 tablet by mouth 2 times daily as needed for Muscle spasms Yes Queta Mckoy DO   glimepiride (AMARYL) 1 MG tablet Take 1 tablet by mouth 2 times daily Yes Queta Mckoy DO   lansoprazole (PREVACID) 30 MG delayed release capsule Take 1 capsule by mouth daily Yes Queta Mckoy DO   Lancets 28G MISC Test blood sugar twice daily and as needed for symptoms of low blood sugar. Yes Queta Mckoy DO   insulin glargine (LANTUS SOLOSTAR) 100 UNIT/ML injection pen Inject 12 Units into the skin daily Yes Queta Mckoy DO   gabapentin (NEURONTIN) 100 MG capsule Take 1 capsule by mouth nightly for 30 days. Intended supply: 30 days Yes Queta Mckoy DO   levothyroxine (SYNTHROID) 50 MCG tablet Take 1 tablet by mouth Daily Take 1 tab on Mon-Fri, not on Sat or Sun.  Yes Queta Mckoy DO   diclofenac sodium (VOLTAREN) 1 % GEL Apply 2 g topically 2 times daily Yes Queta Mckoy DO   blood glucose test Temporal   SpO2: 97%   Weight: 160 lb (72.6 kg)   Height: 5' 1\" (1.549 m)     Body mass index is 30.23 kg/m². Based upon direct observation of the patient, evaluation of cognition reveals recent and remote memory intact. Patient's complete Health Risk Assessment and screening values have been reviewed and are found in Flowsheets. The following problems were reviewed today and where indicated follow up appointments were made and/or referrals ordered. Positive Risk Factor Screenings with Interventions:         Substance History:  Social History     Tobacco History     Smoking Status  Never Smoker    Smokeless Tobacco Use  Never Used          Alcohol History     Alcohol Use Status  Not Currently          Drug Use     Drug Use Status  Yes Comment  Norco 5/325          Sexual Activity     Sexually Active  Not Currently               Alcohol Screening:       A score of 8 or more is associated with harmful or hazardous drinking. A score of 13 or more in women, and 15 or more in men, is likely to indicate alcohol dependence. Substance Abuse Interventions:  · She takes Norco but she does not abuse this. OARRS reports of been reviewed. She does not need any prescription for [place] today    General Health and ACP:  General  In general, how would you say your health is?: (!) Poor  In the past 7 days, have you experienced any of the following? New or Increased Pain, New or Increased Fatigue, Loneliness, Social Isolation, Stress or Anger?: None of These  Do you get the social and emotional support that you need?: Yes  Do you have a Living Will?: Yes  Advance Directives     Power of  Living Will ACP-Advance Directive ACP-Power of     Not on File Filed on 11/19/19 Filed Not on File      General Health Risk Interventions:  · She does not have a DURABLE POWER OF  for healthcare I gave her instructions and how to get this done.     Health Habits/Nutrition:  Health Habits/Nutrition  Do you exercise for at least 20 minutes 2-3 times per week?: (!) No  Have you lost any weight without trying in the past 3 months?: No  Do you eat fewer than 2 meals per day?: (!) Yes  Have you seen a dentist within the past year?: (!) No  Body mass index: (!) 30.23  Health Habits/Nutrition Interventions:  · She is unwilling to exercise regularly. She does need to see the dentist because she has dentures. She needs to lose weight she is aware of that.     Hearing/Vision:  No exam data present  Hearing/Vision  Do you or your family notice any trouble with your hearing?: (!) Yes  Do you have difficulty driving, watching TV, or doing any of your daily activities because of your eyesight?: No  Have you had an eye exam within the past year?: Yes  Hearing/Vision Interventions:  · Hearing concerns:  patient declines any further evaluation/treatment for hearing issues      Personalized Preventive Plan   Current Health Maintenance Status  Immunization History   Administered Date(s) Administered    Influenza A ([id number]) Vaccine PF IM 12/15/2009    Influenza Vaccine, unspecified formulation 10/10/2017    Influenza Virus Vaccine 10/10/2017    Influenza, High Dose (Fluzone 65 yrs and older) 10/16/2018    Influenza, Quadv, adjuvanted, 65 yrs +, IM, PF (Fluad) 11/18/2020    Influenza, Triv, inactivated, subunit, adjuvanted, IM (Fluad 65 yrs and older) 10/15/2019    Pneumococcal Conjugate 13-valent ([id number]) 06/11/2015    Pneumococcal Polysaccharide ([id number]) 02/20/2018    Td (Adult), 2 Lf Tetanus Toxoid, Pf (Td, Absorbed) 05/15/2018    Tdap (Boostrix, Adacel) 10/03/2019    Zoster Live (Zostavax) 06/11/2014        Health Maintenance   Topic Date Due    Shingles Vaccine (2 of 3) 08/06/2014    Annual Wellness Visit (AWV)  06/11/2019    TSH testing  11/18/2021    Potassium monitoring  11/18/2021    Creatinine monitoring  11/18/2021    DTaP/Tdap/Td vaccine (2 - Td) 10/03/2029    DEXA (modify frequency per FRAX score) Completed    Flu vaccine  Completed    Pneumococcal 65+ years Vaccine  Completed    Hepatitis A vaccine  Aged Out    Hib vaccine  Aged Out    Meningococcal (ACWY) vaccine  Aged Out     Recommendations for [employer] Due: see orders and patient instructions/AVS.  . Recommended screening schedule for the next 5-10 years is provided to the patient in written form: see Patient Instructions/AVS.      She complains of about a 1 week history of sinus pain, pressure, blowing out some yellow mucus, no fever or chills. No sore throat. We gave her a prescription for Amoxil 500 mg 3 times daily for 7 days. If no better she is to come back to see me or go through [facility] care        Cardiovascular Disease Risk Counseling: Assessed the patient's risk to develop cardiovascular disease and reviewed main risk factors. Reviewed steps to reduce disease risk including:   · Quitting tobacco use, reducing amount smoked, or not starting the habit  · Making healthy food choices  · Being physically active and gradualy increasing activity levels   · Reduce weight and determine a healthy BMI goal  · Monitor blood pressure and treat if higher than 140/90 mmHg  · Maintain blood total cholesterol levels under 5 mmol/l or 190 mg/dl  · Maintain LDL cholesterol levels under 3.0 mmol/l or 115 mg/dl   · Control blood glucose levels  · Consider taking aspirin (75 mg daily), once blood pressure is controlled   Provided a follow up plan.   Time spent (minutes): 8

## 2020-12-15 NOTE — PATIENT INSTRUCTIONS
Learning About Medical Power of   What is a medical power of ? A medical power of , also called a durable power of  for health care, is one type of the legal forms called advance directives. It lets you name the person you want to make treatment decisions for you if you can't speak or decide for yourself. The person you choose is called your health care agent. This person is also called a health care proxy or health care surrogate. A medical power of  may be called something else in your state. How do you choose a health care agent? Choose your health care agent carefully. This person may or may not be a family member. Talk to the person before you make your final decision. Make sure he or she is comfortable with this responsibility. It's a good idea to choose someone who:  · Is at least 25years old. · Knows you well and understands what makes life meaningful for you. · Understands your Restoration and moral values. · Will do what you want, not what he or she wants. · Will be able to make difficult choices at a stressful time. · Will be able to refuse or stop treatment, if that is what you would want, even if you could die. · Will be firm and confident with health professionals if needed. · Will ask questions to get needed information. · Lives near you or agrees to travel to you if needed. Your family may help you make medical decisions while you can still be part of that process. But it's important to choose one person to be your health care agent in case you aren't able to make decisions for yourself. If you don't fill out the legal form and name a health care agent, the decisions your family can make may be limited. A health care agent may be called something else in your state. Who will make decisions for you if you don't have a health care agent? If you don't have a health care agent or a living will, you may not get the care you want. Decisions may be made by family members who disagree about your medical care. Or decisions may be made by a medical professional who doesn't know you well. In some cases, a  makes the decisions. When you name a health care agent, it is very clear who has the power to make health decisions for you. How do you name a health care agent? You name your health care agent on a legal form. This form is usually called a medical power of . Ask your hospital, state bar association, or office on aging where to find these forms. You must sign the form to make it legal. Some states require you to get the form notarized. This means that a person called a  watches you sign the form and then he or she signs the form. Some states also require that two or more witnesses sign the form. Be sure to tell your family members and doctors who your health care agent is. Where can you learn more? Go to https://[email or web address] org and sign in to your NPTV account. Enter [id number] in the Testif box to learn more about \"Learning About [address]. \"     If you do not have an account, please click on the \"Sign Up Now\" link. Current as of: December 9, 2019               Content Version: 12.6  © 5433-5594 [employer]. Care instructions adapted under license by [facility] ([facility]). If you have questions about a medical condition or this instruction, always ask your healthcare professional. [address] any warranty or liability for your use of this information. Learning About Low-Carbohydrate Diets  What is a low-carbohydrate diet? A low-carbohydrate (or \"low-carb\") diet limits foods and drinks that have carbohydrates. This includes grains, fruits, milk and yogurt, and starchy vegetables like potatoes, beans, and corn. It also avoids foods and drinks that have added sugar. Instead, low-carb diets include foods that are high in protein and fat. Why might you follow a low-carb diet? Low-carb diets may be used for a variety of reasons, such as for weight loss. People who have diabetes may use a low-carb diet to help manage their blood sugar levels. What should you do before you start the diet? Talk to your doctor before you try any diet. This is even more important if you have health problems like kidney disease, heart disease, or diabetes. Your doctor may suggest that you meet with a registered dietitian. A dietitian can help you make an eating plan that works for you. What foods do you eat on a low-carb diet? On a low-carb diet, you choose foods that are high in protein and fat. Examples of these are:  · Meat, poultry, and fish. · Eggs. · Nuts, such as walnuts, pecans, almonds, and peanuts. · Peanut butter and other nut butters. · Tofu. · Avocado. · Kayla Stare. · Non-starchy vegetables like broccoli, cauliflower, green beans, mushrooms, peppers, lettuce, and spinach. · Unsweetened non-dairy milks like almond milk and coconut milk. · Cheese, cottage cheese, and cream cheese. Current as of: August 22, 2019               Content Version: 12.6  © 5712-8085 [employer]. Care instructions adapted under license by [facility] [place] ([facility]). If you have questions about a medical condition or this instruction, always ask your healthcare professional. [place] [address] any warranty or liability for your use of this information.       Personalized Preventive Plan for [facility] - 12/15/2020 Medicare offers a range of preventive health benefits. Some of the tests and screenings are paid in full while other may be subject to a deductible, co-insurance, and/or copay. Some of these benefits include a comprehensive review of your medical history including lifestyle, illnesses that may run in your family, and various assessments and screenings as appropriate. After reviewing your medical record and screening and assessments performed today your provider may have ordered immunizations, labs, imaging, and/or referrals for you. A list of these orders (if applicable) as well as your Preventive Care list are included within your After Visit Summary for your review. Other Preventive Recommendations:    · A preventive eye exam performed by an eye specialist is recommended every 1-2 years to screen for glaucoma; cataracts, macular degeneration, and other eye disorders. · A preventive dental visit is recommended every 6 months. · Try to get at least 150 minutes of exercise per week or 10,000 steps per day on a pedometer . · Order or download the FREE \"Exercise & Physical Activity: Your Everyday Guide\" from [employer]. Call 4-907.120.8608 or search [employer] online. · You need 3137-5782 mg of calcium and 6452-7732 IU of vitamin D per day. It is possible to meet your calcium requirement with diet alone, but a vitamin D supplement is usually necessary to meet this goal.  · When exposed to the sun, use a sunscreen that protects against both UVA and UVB radiation with an SPF of 30 or greater. Reapply every 2 to 3 hours or after sweating, drying off with a towel, or swimming. · Always wear a seat belt when traveling in a car. Always wear a helmet when riding a bicycle or motorcycle.

## 2021-01-25 ENCOUNTER — IMMUNIZATION (OUTPATIENT)
Dept: PRIMARY CARE CLINIC | Age: 86
End: 2021-01-25
Payer: MEDICARE

## 2021-01-25 PROCEDURE — 0001A COVID-19, PFIZER VACCINE 30MCG/0.3ML DOSE: CPT | Performed by: NURSE PRACTITIONER

## 2021-01-25 PROCEDURE — 91300 COVID-19, PFIZER VACCINE 30MCG/0.3ML DOSE: CPT | Performed by: NURSE PRACTITIONER

## 2021-01-29 ENCOUNTER — TELEPHONE (OUTPATIENT)
Dept: PRIMARY CARE CLINIC | Age: 86
End: 2021-01-29

## 2021-01-29 NOTE — TELEPHONE ENCOUNTER
[facility] called about needing her pen needles. It looks like a refill request was sent this morning about it. She said it seems that she is almost running short on these every month. She also said you were going to change her order for voltaren gel. She said that she spoke to you about it and that you said you were going to up the dose/strength of it. She would like these things to be sent to [address] [place] [address] in [place].

## 2021-01-30 DIAGNOSIS — E11.65 TYPE 2 DIABETES MELLITUS WITH HYPERGLYCEMIA, WITH LONG-TERM CURRENT USE OF INSULIN (HCC): ICD-10-CM

## 2021-01-30 DIAGNOSIS — M19.91 PRIMARY OSTEOARTHRITIS, UNSPECIFIED SITE: Primary | ICD-10-CM

## 2021-01-30 DIAGNOSIS — Z79.4 TYPE 2 DIABETES MELLITUS WITH HYPERGLYCEMIA, WITH LONG-TERM CURRENT USE OF INSULIN (HCC): ICD-10-CM

## 2021-01-30 RX ORDER — PEN NEEDLE, DIABETIC 31 GX5/16"
1 NEEDLE, DISPOSABLE MISCELLANEOUS DAILY
Qty: 150 EACH | Refills: 2 | Status: SHIPPED
Start: 2021-01-30 | End: 2021-11-29

## 2021-02-16 ENCOUNTER — IMMUNIZATION (OUTPATIENT)
Dept: PRIMARY CARE CLINIC | Age: 86
End: 2021-02-16
Payer: MEDICARE

## 2021-02-16 PROCEDURE — 91300 COVID-19, PFIZER VACCINE 30MCG/0.3ML DOSE: CPT | Performed by: CLINICAL NURSE SPECIALIST

## 2021-02-16 PROCEDURE — 0002A COVID-19, PFIZER VACCINE 30MCG/0.3ML DOSE: CPT | Performed by: CLINICAL NURSE SPECIALIST

## 2021-04-20 DIAGNOSIS — Z79.4 TYPE 2 DIABETES MELLITUS WITH HYPERGLYCEMIA, WITH LONG-TERM CURRENT USE OF INSULIN (HCC): ICD-10-CM

## 2021-04-20 DIAGNOSIS — E11.65 TYPE 2 DIABETES MELLITUS WITH HYPERGLYCEMIA, WITH LONG-TERM CURRENT USE OF INSULIN (HCC): ICD-10-CM

## 2021-04-20 RX ORDER — BLOOD SUGAR DIAGNOSTIC
STRIP MISCELLANEOUS
Qty: 100 EACH | Refills: 5 | Status: SHIPPED
Start: 2021-04-20 | End: 2021-04-28 | Stop reason: SDUPTHER

## 2021-04-24 ENCOUNTER — OFFICE VISIT (OUTPATIENT)
Dept: FAMILY MEDICINE CLINIC | Age: 86
End: 2021-04-24
Payer: MEDICARE

## 2021-04-24 VITALS
TEMPERATURE: 97.4 F | HEART RATE: 105 BPM | BODY MASS INDEX: 32.1 KG/M2 | DIASTOLIC BLOOD PRESSURE: 74 MMHG | HEIGHT: 61 IN | WEIGHT: 170 LBS | OXYGEN SATURATION: 99 % | SYSTOLIC BLOOD PRESSURE: 136 MMHG

## 2021-04-24 DIAGNOSIS — H60.501 ACUTE OTITIS EXTERNA OF RIGHT EAR, UNSPECIFIED TYPE: Primary | ICD-10-CM

## 2021-04-24 PROCEDURE — 99213 OFFICE O/P EST LOW 20 MIN: CPT | Performed by: PHYSICIAN ASSISTANT

## 2021-04-24 ASSESSMENT — ENCOUNTER SYMPTOMS
SINUS PAIN: 0
FACIAL SWELLING: 0
RHINORRHEA: 0
SORE THROAT: 0
TROUBLE SWALLOWING: 0
VOICE CHANGE: 0
SINUS PRESSURE: 0

## 2021-04-24 NOTE — PROGRESS NOTES
Chief Complaint   Patient presents with    Otalgia     Right side is worst, left side aches off and on. Pt also reports headache. Onset 4 days       HPI: This is an 45-year-old female presents with right ear pain over the last couple days. She cannot wear her right hearing aid. Denies any recent coughs or colds. She does have a longstanding history of cervical radiculitis. She does complain of some neck pain. Denies any nausea vomiting fevers or any upper respiratory issues. [place] any headaches.         Current Outpatient Medications:     Ascorbic Acid (UNRULY-C PO), Take by mouth daily, Disp: , Rfl:     neomycin-polymyxin-hydrocortisone (CORTISPORIN) 3.5-93049-6 otic solution, Place 3 drops into the right ear 3 times daily for 7 days, Disp: 1 mL, Rfl: 0    ONETOUCH VERIO strip, TEST 2 TIMES DAILY AS NEEDED (SYMPTOMS OF LOW BLOOD SUGAR) AS INSTRUCTED, Disp: 100 each, Rfl: 5    Insulin Pen Needle (B-D ULTRAFINE III SHORT PEN) 31G X 8 MM MISC, Inject 1 each as directed daily BD Ultra Fine, Disp: 150 each, Rfl: 2    diclofenac sodium (VOLTAREN) 1 % GEL, Apply 4 g topically 2 times daily, Disp: 250 g, Rfl: 5    guaiFENesin (MUCINEX) 600 MG extended release tablet, guaiFENesin Guaifenesin Active 1 TAB Oral Q12H August 18th, 2020 3:37pm 08-  [facility] ([id number]), Disp: , Rfl:     vitamin D (CHOLECALCIFEROL) 25 MCG (1000 UT) TABS tablet, Take 1,000 Units by mouth daily, Disp: , Rfl:     Calcium Ascorbate 500 MG TABS, 500 mg, Disp: , Rfl:     amLODIPine (NORVASC) 10 MG tablet, Take 1 tablet by mouth daily, Disp: 90 tablet, Rfl: 3    ezetimibe (ZETIA) 10 MG tablet, Take 1 tablet by mouth daily, Disp: 90 tablet, Rfl: 3    cyclobenzaprine (FLEXERIL) 5 MG tablet, Take 1 tablet by mouth 2 times daily as needed for Muscle spasms, Disp: 20 tablet, Rfl: 3    glimepiride (AMARYL) 1 MG tablet, Take 1 tablet by mouth 2 times daily, Disp: 180 tablet, Rfl: 3    lansoprazole (PREVACID) 30 MG delayed release capsule, Take 1 capsule by mouth daily, Disp: 90 capsule, Rfl: 3    Lancets 28G MISC, Test blood sugar twice daily and as needed for symptoms of low blood sugar., Disp: 100 each, Rfl: 5    insulin glargine (LANTUS SOLOSTAR) 100 UNIT/ML injection pen, Inject 12 Units into the skin daily, Disp: 5 pen, Rfl: 3    levothyroxine (SYNTHROID) 50 MCG tablet, Take 1 tablet by mouth Daily Take 1 tab on Mon-Fri, not on Sat or Sun., Disp: 90 tablet, Rfl: 3    diclofenac sodium (VOLTAREN) 1 % GEL, Apply 2 g topically 2 times daily, Disp: 3 Tube, Rfl: 5    Blood Glucose Monitoring Suppl (BLOOD GLUCOSE MONITOR SYSTEM) w/Device KIT, Test blood sugar twice daily, and as needed for symptoms of low blood sugar. (Patient is insulin dependant.), Disp: 1 kit, Rfl: 0    gabapentin (NEURONTIN) 100 MG capsule, Take 2 capsules by mouth nightly for 30 days. Intended supply: 30 days, Disp: 60 capsule, Rfl: 5    HYDROcodone-acetaminophen (NORCO) 5-325 MG per tablet, Take 1 tablet by mouth every 12 hours as needed for Pain for up to 7 days. , Disp: 30 tablet, Rfl: 0       Allergies   Allergen Reactions    Hydrocodone-Acetaminophen      Other reaction(s): Unknown    Ibuprofen     Lisinopril      Other reaction(s): Cough    Nsaids Other (See Comments)    Pregabalin Swelling    Simvastatin     Statins Other (See Comments)     Muscle weakness          Review of Systems  Review of Systems   HENT: Positive for ear pain (Right canal). Negative for congestion, ear discharge, facial swelling, hearing loss, mouth sores, nosebleeds, postnasal drip, rhinorrhea, sinus pressure, sinus pain, sneezing, sore throat, tinnitus, trouble swallowing and voice change.           VS:  /74 (Site: Right Upper Arm, Position: Sitting, Cuff Size: Medium Adult)   Pulse 105   Temp 97.4 °F (36.3 °C) (Temporal)   Ht 5' 1\" (1.549 m)   Wt 170 lb (77.1 kg)   SpO2 99%   BMI 32.12 kg/m²     Patient's medical, social, and family history reviewed      Physical Exam  Physical Exam  HENT:      Right Ear: Tympanic membrane and external ear normal. There is no impacted cerumen. Left Ear: Tympanic membrane, ear canal and external ear normal. There is no impacted cerumen. Ears:      Comments: Right ear canal is erythematous with minimal edema. TM is intact without any injection or effusion. No perforation noted. Nose: Nose normal.      Mouth/Throat:      Mouth: Mucous membranes are moist.      Pharynx: Oropharynx is clear. No oropharyngeal exudate. Eyes:      Extraocular Movements: Extraocular movements intact. Conjunctiva/sclera: Conjunctivae normal.      Pupils: Pupils are equal, round, and reactive to light. Neck:      Musculoskeletal: Normal range of motion and neck supple. Muscular tenderness (Tender to palpation over the right and left para cervical area) present. No neck rigidity. Cardiovascular:      Rate and Rhythm: Normal rate and regular rhythm. Pulses: Normal pulses. Heart sounds: Normal heart sounds. Pulmonary:      Effort: Pulmonary effort is normal.      Breath sounds: Normal breath sounds. Lymphadenopathy:      Cervical: No cervical adenopathy. Skin:     General: Skin is warm and dry. Neurological:      General: No focal deficit present. Mental Status: She is oriented to person, place, and time. Mental status is at baseline. Psychiatric:         Mood and Affect: Mood normal.         Behavior: Behavior normal.         Thought Content: Thought content normal.         Judgment: Judgment normal.           Assessment/Plan: Elder Love was seen today for otalgia. Diagnoses and all orders for this visit:    Acute otitis externa of right ear, unspecified type  -     neomycin-polymyxin-hydrocortisone (CORTISPORIN) 3.5-25586-6 otic solution; Place 3 drops into the right ear 3 times daily for 7 days        Patient will follow with Dr. Joe Mathew on Wednesday for follow-up.   She is not to wear her hearing aid in her right ear until she is seen on Wednesday.     Tejas Macdonald PA-C

## 2021-04-28 ENCOUNTER — OFFICE VISIT (OUTPATIENT)
Dept: PRIMARY CARE CLINIC | Age: 86
End: 2021-04-28
Payer: MEDICARE

## 2021-04-28 VITALS
DIASTOLIC BLOOD PRESSURE: 64 MMHG | HEART RATE: 76 BPM | TEMPERATURE: 98.1 F | OXYGEN SATURATION: 97 % | HEIGHT: 61 IN | WEIGHT: 170 LBS | SYSTOLIC BLOOD PRESSURE: 122 MMHG | BODY MASS INDEX: 32.1 KG/M2

## 2021-04-28 DIAGNOSIS — E11.65 TYPE 2 DIABETES MELLITUS WITH HYPERGLYCEMIA, WITH LONG-TERM CURRENT USE OF INSULIN (HCC): ICD-10-CM

## 2021-04-28 DIAGNOSIS — E55.9 VITAMIN D DEFICIENCY: ICD-10-CM

## 2021-04-28 DIAGNOSIS — E53.8 VITAMIN B12 DEFICIENCY: ICD-10-CM

## 2021-04-28 DIAGNOSIS — I10 ESSENTIAL HYPERTENSION: ICD-10-CM

## 2021-04-28 DIAGNOSIS — Z79.4 TYPE 2 DIABETES MELLITUS WITH HYPERGLYCEMIA, WITH LONG-TERM CURRENT USE OF INSULIN (HCC): ICD-10-CM

## 2021-04-28 DIAGNOSIS — M54.16 LEFT LUMBAR RADICULOPATHY: ICD-10-CM

## 2021-04-28 DIAGNOSIS — G89.4 CHRONIC PAIN SYNDROME: ICD-10-CM

## 2021-04-28 DIAGNOSIS — E78.2 MIXED HYPERLIPIDEMIA: ICD-10-CM

## 2021-04-28 DIAGNOSIS — M51.26 HERNIATED LUMBAR INTERVERTEBRAL DISC: ICD-10-CM

## 2021-04-28 DIAGNOSIS — N18.30 STAGE 3 CHRONIC KIDNEY DISEASE, UNSPECIFIED WHETHER STAGE 3A OR 3B CKD (HCC): ICD-10-CM

## 2021-04-28 DIAGNOSIS — H65.193 OTHER NON-RECURRENT ACUTE NONSUPPURATIVE OTITIS MEDIA OF BOTH EARS: ICD-10-CM

## 2021-04-28 DIAGNOSIS — K31.84 GASTROPARESIS: ICD-10-CM

## 2021-04-28 DIAGNOSIS — J01.40 ACUTE NON-RECURRENT PANSINUSITIS: Primary | ICD-10-CM

## 2021-04-28 DIAGNOSIS — E03.9 ACQUIRED HYPOTHYROIDISM: ICD-10-CM

## 2021-04-28 DIAGNOSIS — R74.8 ABNORMAL LIVER ENZYMES: ICD-10-CM

## 2021-04-28 DIAGNOSIS — E04.1 THYROID NODULE: ICD-10-CM

## 2021-04-28 PROCEDURE — 99214 OFFICE O/P EST MOD 30 MIN: CPT | Performed by: INTERNAL MEDICINE

## 2021-04-28 PROCEDURE — 96372 THER/PROPH/DIAG INJ SC/IM: CPT | Performed by: INTERNAL MEDICINE

## 2021-04-28 RX ORDER — BLOOD SUGAR DIAGNOSTIC
STRIP MISCELLANEOUS
Qty: 100 EACH | Refills: 5 | Status: SHIPPED
Start: 2021-04-28 | End: 2022-05-24 | Stop reason: SDUPTHER

## 2021-04-28 RX ORDER — LANCETS 23 GAUGE
EACH MISCELLANEOUS
Qty: 100 EACH | Refills: 5 | Status: SHIPPED
Start: 2021-04-28 | End: 2021-11-29 | Stop reason: SDUPTHER

## 2021-04-28 RX ORDER — AMOXICILLIN AND CLAVULANATE POTASSIUM 875; 125 MG/1; MG/1
1 TABLET, FILM COATED ORAL 2 TIMES DAILY
Qty: 20 TABLET | Refills: 0 | Status: SHIPPED | OUTPATIENT
Start: 2021-04-28 | End: 2021-05-08

## 2021-04-28 RX ORDER — HYDROCODONE BITARTRATE AND ACETAMINOPHEN 5; 325 MG/1; MG/1
1 TABLET ORAL EVERY 12 HOURS PRN
Qty: 30 TABLET | Refills: 0 | Status: SHIPPED
Start: 2021-04-28 | End: 2021-08-31 | Stop reason: ALTCHOICE

## 2021-04-28 RX ORDER — INSULIN GLARGINE 100 [IU]/ML
12 INJECTION, SOLUTION SUBCUTANEOUS DAILY
Qty: 5 PEN | Refills: 3 | Status: SHIPPED
Start: 2021-04-28 | End: 2022-05-24 | Stop reason: SDUPTHER

## 2021-04-28 RX ORDER — TRIAMCINOLONE ACETONIDE 40 MG/ML
40 INJECTION, SUSPENSION INTRA-ARTICULAR; INTRAMUSCULAR ONCE
Status: COMPLETED | OUTPATIENT
Start: 2021-04-28 | End: 2021-04-28

## 2021-04-28 RX ADMIN — TRIAMCINOLONE ACETONIDE 40 MG: 40 INJECTION, SUSPENSION INTRA-ARTICULAR; INTRAMUSCULAR at 14:21

## 2021-04-28 ASSESSMENT — ENCOUNTER SYMPTOMS
EYE REDNESS: 0
SINUS PAIN: 1
VOMITING: 0
EYE ITCHING: 1
COUGH: 0
ABDOMINAL DISTENTION: 0
CONSTIPATION: 1
WHEEZING: 0
EYE PAIN: 0
ABDOMINAL PAIN: 0
SHORTNESS OF BREATH: 0
APNEA: 0
BACK PAIN: 1
NAUSEA: 1
CHEST TIGHTNESS: 0
SINUS PRESSURE: 1
EYE DISCHARGE: 0

## 2021-04-28 NOTE — ASSESSMENT & PLAN NOTE
Check liver enzymes, may need repeat ultrasound of her liver as she was told she had fatty liver in the past

## 2021-04-28 NOTE — PROGRESS NOTES
gastroparesis. Saw initially . GI  in [place] and was referred to Dr. Donovan Sims in [place] who said that she was too old for surgery. Review of Systems   Constitutional: Positive for fatigue and fever. HENT: Positive for congestion, ear pain, postnasal drip, sinus pressure and sinus pain. Eyes: Positive for itching. Negative for pain, discharge, redness and visual disturbance. Respiratory: Negative for apnea, cough, chest tightness, shortness of breath and wheezing. Cardiovascular: Negative for chest pain, palpitations and leg swelling. Gastrointestinal: Positive for constipation and nausea. Negative for abdominal distention, abdominal pain and vomiting. Endocrine: Negative for cold intolerance, heat intolerance and polyuria. Genitourinary: Positive for frequency and urgency. Negative for difficulty urinating, dysuria, flank pain and pelvic pain. Musculoskeletal: Positive for arthralgias, back pain, myalgias and neck pain. Negative for gait problem, joint swelling and neck stiffness. Current Outpatient Medications:     insulin glargine (LANTUS SOLOSTAR) 100 UNIT/ML injection pen, Inject 12 Units into the skin daily, Disp: 5 pen, Rfl: 3    HYDROcodone-acetaminophen (NORCO) 5-325 MG per tablet, Take 1 tablet by mouth every 12 hours as needed for Pain for up to 7 days. , Disp: 30 tablet, Rfl: 0    Lancets 28G MISC, Test blood sugar twice daily and as needed for symptoms of low blood sugar., Disp: 100 each, Rfl: 5    blood glucose test strips (ONETOUCH VERIO) strip, TEST 2 TIMES DAILY AS NEEDED (SYMPTOMS OF LOW BLOOD SUGAR) AS INSTRUCTED, Disp: 100 each, Rfl: 5    neomycin-polymyxin-hydrocortisone (CORTISPORIN) 3.5-35598-9 otic solution, Place 3 drops into both ears 3 times daily for 10 days Instill into both  Ears, Disp: 1 Bottle, Rfl: 1    amoxicillin-clavulanate (AUGMENTIN) 875-125 MG per tablet, Take 1 tablet by mouth 2 times daily for 10 days, Disp: 20 tablet, Rfl: 0    Ascorbic Acid (UNRULY-C PO), Take by mouth daily, Disp: , Rfl:     Insulin Pen Needle (B-D ULTRAFINE III SHORT PEN) 31G X 8 MM MISC, Inject 1 each as directed daily BD Ultra Fine, Disp: 150 each, Rfl: 2    diclofenac sodium (VOLTAREN) 1 % GEL, Apply 4 g topically 2 times daily, Disp: 250 g, Rfl: 5    guaiFENesin (MUCINEX) 600 MG extended release tablet, guaiFENesin Guaifenesin Active 1 TAB Oral Q12H August 18th, 2020 3:37pm 08-  [facility] ([id number]), Disp: , Rfl:     vitamin D (CHOLECALCIFEROL) 25 MCG (1000 UT) TABS tablet, Take 1,000 Units by mouth daily, Disp: , Rfl:     Calcium Ascorbate 500 MG TABS, 500 mg, Disp: , Rfl:     amLODIPine (NORVASC) 10 MG tablet, Take 1 tablet by mouth daily, Disp: 90 tablet, Rfl: 3    ezetimibe (ZETIA) 10 MG tablet, Take 1 tablet by mouth daily, Disp: 90 tablet, Rfl: 3    cyclobenzaprine (FLEXERIL) 5 MG tablet, Take 1 tablet by mouth 2 times daily as needed for Muscle spasms, Disp: 20 tablet, Rfl: 3    glimepiride (AMARYL) 1 MG tablet, Take 1 tablet by mouth 2 times daily, Disp: 180 tablet, Rfl: 3    lansoprazole (PREVACID) 30 MG delayed release capsule, Take 1 capsule by mouth daily, Disp: 90 capsule, Rfl: 3    levothyroxine (SYNTHROID) 50 MCG tablet, Take 1 tablet by mouth Daily Take 1 tab on Mon-Fri, not on Sat or Sun., Disp: 90 tablet, Rfl: 3    diclofenac sodium (VOLTAREN) 1 % GEL, Apply 2 g topically 2 times daily, Disp: 3 Tube, Rfl: 5    Blood Glucose Monitoring Suppl (BLOOD GLUCOSE MONITOR SYSTEM) w/Device KIT, Test blood sugar twice daily, and as needed for symptoms of low blood sugar. (Patient is insulin dependant.), Disp: 1 kit, Rfl: 0    gabapentin (NEURONTIN) 100 MG capsule, Take 2 capsules by mouth nightly for 30 days.  Intended supply: 30 days, Disp: 60 capsule, Rfl: 5     Allergies   Allergen Reactions    Hydrocodone-Acetaminophen      Other reaction(s): Unknown    Ibuprofen     Lisinopril      Other reaction(s): Cough    Nsaids Other Position: Sitting   Cuff Size: Medium Adult   Pulse: 76   Temp: 98.1 °F (36.7 °C)   TempSrc: Temporal   SpO2: 97%   Weight: 170 lb (77.1 kg)   Height: 5' 1\" (1.549 m)        Exam:  Physical Exam  Vitals signs reviewed. Constitutional:       General: She is not in acute distress. Appearance: Normal appearance. She is not ill-appearing. HENT:      Head: Normocephalic and atraumatic. Comments: Tender to palpation maxillary sinus on the right     Right Ear: External ear normal. There is no impacted cerumen. Left Ear: Ear canal and external ear normal. There is no impacted cerumen. Ears:      Comments: Right and left tympanic membranes were slightly retracted and erythematous. She had mild excoriation of the EAC on the right side     Nose: Congestion present. Mouth/Throat:      Mouth: Mucous membranes are moist.      Pharynx: Oropharynx is clear. No oropharyngeal exudate or posterior oropharyngeal erythema. Comments: Clear postnasal drainage  Eyes:      General: No scleral icterus. Right eye: No discharge. Left eye: No discharge. Extraocular Movements: Extraocular movements intact. Conjunctiva/sclera: Conjunctivae normal.      Pupils: Pupils are equal, round, and reactive to light. Neck:      Musculoskeletal: Normal range of motion and neck supple. No neck rigidity or muscular tenderness. Cardiovascular:      Rate and Rhythm: Normal rate and regular rhythm. Heart sounds: Normal heart sounds. No murmur. Pulmonary:      Effort: Pulmonary effort is normal. No respiratory distress. Breath sounds: Normal breath sounds. No wheezing. Abdominal:      General: Abdomen is flat. There is distension. Palpations: Abdomen is soft. There is mass. Tenderness: There is no abdominal tenderness. Musculoskeletal: Normal range of motion. Lymphadenopathy:      Cervical: No cervical adenopathy. Skin:     General: Skin is warm and dry. Coloration: Skin is pale. Skin is not jaundiced. Neurological:      General: No focal deficit present. Mental Status: She is alert and oriented to person, place, and time. Motor: No weakness. Gait: Gait normal.   Psychiatric:         Mood and Affect: Mood normal.         Behavior: Behavior normal.         Thought Content: Thought content normal.          Last labs reviewed. ASSESSMENT & PLAN :   Problem List        Circulatory    Essential hypertension     Blood pressures are stable. Continue medications and monitor blood pressures at home. Call office if systolics are over 367 over diastolics over 90. return for fasting CMP         Relevant Medications    amLODIPine (NORVASC) 10 MG tablet       Respiratory    Acute non-recurrent pansinusitis - Primary       IM Kenalog, Augmentin 875 twice daily for 10 days, Mucinex D for congestion. Relevant Medications    guaiFENesin (MUCINEX) 600 MG extended release tablet    amoxicillin-clavulanate (AUGMENTIN) 875-125 MG per tablet       Digestive    Gastroparesis       follow-up with GI as directed            Endocrine    [facility] 2 [facility] mellitus ([facility]       continue watch blood sugars continue with her Lantus check hemoglobin A1c in future         Relevant Medications    Blood Glucose Monitoring Suppl (BLOOD GLUCOSE MONITOR SYSTEM) w/Device KIT    glimepiride (AMARYL) 1 MG tablet    Insulin Pen Needle (B-D ULTRAFINE III SHORT PEN) 31G X 8 MM MISC    insulin glargine (LANTUS SOLOSTAR) 100 UNIT/ML injection pen    Lancets 28G MISC    blood glucose test strips (ONETOUCH VERIO) strip    Other Relevant Orders    Comprehensive Metabolic Panel    Hemoglobin A1C    Microalbumin / Creatinine Urine Ratio    Thyroid nodule    Relevant Medications    levothyroxine (SYNTHROID) 50 MCG tablet    Hypothyroidism       check TSH and free T4 and stay on present dose of levothyroxine 50 mcg until results are back.   Will make adjustment of dose if necessary Relevant Medications    levothyroxine (SYNTHROID) 50 MCG tablet    Other Relevant Orders    T4, Free    TSH without Reflex       Nervous and Auditory    Left lumbar radiculopathy       continue Norco for now. When she comes back in 2 weeks will make referral to's spine doctor such as Dr. Ciara Valdez or spine doctor in [facility]         Relevant Medications    cyclobenzaprine (FLEXERIL) 5 MG tablet    Otitis media       Augmentin and Cortisporin otic. If no better after she finishes the medication will refer to ENT         Relevant Medications    neomycin-polymyxin-hydrocortisone (CORTISPORIN) 3.5-04540-9 otic solution    amoxicillin-clavulanate (AUGMENTIN) 875-125 MG per tablet       Musculoskeletal and Integument    Herniated lumbar intervertebral disc       will reevaluate next visit. Continue Norco for pain            Genitourinary    Chronic kidney disease     Avoid NSAID and monitor blood work          Relevant Orders    CBC Auto Differential       Other    Chronic pain       continue Norco and Flexeril         Relevant Medications    cyclobenzaprine (FLEXERIL) 5 MG tablet    HYDROcodone-acetaminophen (NORCO) 5-325 MG per tablet    Other Relevant Orders    Sedimentation Rate    Abnormal liver enzymes     Check liver enzymes, may need repeat ultrasound of her liver as she was told she had fatty liver in the past          Mixed hyperlipidemia        continue ezetimibe as she is intolerant of statins. Check lipid profile          Relevant Medications    amLODIPine (NORVASC) 10 MG tablet    ezetimibe (ZETIA) 10 MG tablet    Vitamin B12 deficiency    Relevant Orders    VITAMIN B12 & FOLATE    Vitamin D deficiency     Check vitamin D level, continue vitamin D3          Relevant Orders    Vitamin D 25 Hydroxy           Return in about 2 weeks (around 5/12/2021), or dm, lumbar pain.        Alonso Coto,   4/28/2021

## 2021-04-28 NOTE — ASSESSMENT & PLAN NOTE
check TSH and free T4 and stay on present dose of levothyroxine 50 mcg until results are back.   Will make adjustment of dose if necessary

## 2021-04-28 NOTE — ASSESSMENT & PLAN NOTE
Blood pressures are stable. Continue medications and monitor blood pressures at home. Call office if systolics are over 345 over diastolics over 90.   return for fasting CMP

## 2021-05-13 ENCOUNTER — OFFICE VISIT (OUTPATIENT)
Dept: PRIMARY CARE CLINIC | Age: 86
End: 2021-05-13
Payer: MEDICARE

## 2021-05-13 VITALS
HEART RATE: 80 BPM | SYSTOLIC BLOOD PRESSURE: 136 MMHG | OXYGEN SATURATION: 97 % | TEMPERATURE: 98.1 F | HEIGHT: 61 IN | BODY MASS INDEX: 32.1 KG/M2 | DIASTOLIC BLOOD PRESSURE: 80 MMHG | WEIGHT: 170 LBS

## 2021-05-13 DIAGNOSIS — Z23 NEED FOR SHINGLES VACCINE: ICD-10-CM

## 2021-05-13 DIAGNOSIS — G62.9 NEUROPATHY: ICD-10-CM

## 2021-05-13 DIAGNOSIS — M54.16 LEFT LUMBAR RADICULOPATHY: Primary | ICD-10-CM

## 2021-05-13 DIAGNOSIS — M51.26 HERNIATED LUMBAR INTERVERTEBRAL DISC: ICD-10-CM

## 2021-05-13 DIAGNOSIS — G89.4 CHRONIC PAIN SYNDROME: ICD-10-CM

## 2021-05-13 DIAGNOSIS — M54.12 CERVICAL RADICULITIS: ICD-10-CM

## 2021-05-13 PROCEDURE — 99214 OFFICE O/P EST MOD 30 MIN: CPT | Performed by: INTERNAL MEDICINE

## 2021-05-13 RX ORDER — GABAPENTIN 300 MG/1
300 CAPSULE ORAL NIGHTLY
Qty: 90 CAPSULE | Refills: 1 | Status: SHIPPED
Start: 2021-05-13 | End: 2021-08-31 | Stop reason: SDUPTHER

## 2021-05-13 ASSESSMENT — ENCOUNTER SYMPTOMS
EYE PAIN: 0
APNEA: 0
CONSTIPATION: 1
ABDOMINAL DISTENTION: 0
SINUS PAIN: 0
EYE DISCHARGE: 0
COUGH: 0
EYE ITCHING: 0
CHEST TIGHTNESS: 0
SINUS PRESSURE: 0
NAUSEA: 1
BACK PAIN: 1
ABDOMINAL PAIN: 0
WHEEZING: 0
VOMITING: 0
SHORTNESS OF BREATH: 0
EYE REDNESS: 0

## 2021-05-13 NOTE — ASSESSMENT & PLAN NOTE
probably secondary to her underlying long term diabetes. Patient refuses EMGs and neurological referral at this time. Try to increase gabapentin to 300 mg at bedtime. If it does not make her too sleepy she can actually increase it to 300 mg twice daily.   Let me know if she wants a larger prescription

## 2021-05-13 NOTE — PROGRESS NOTES
2021    Name: Tianna Menchaca : 1935 Sex: female  Age: 81 y.o. Subjective:  Chief Complaint   Patient presents with    Other     2 week f/u        HPI . She has a history of type 2 diabetes mellitus and takes her glimepiride most of the time but sometimes her blood pressure is low blood sugar is low she will not take it. She is also on Lantus 12 units daily. Blood sugars have been anywhere from 100-200 but when she gets sick it went higher than that. History of hypothyroidism on levothyroxine 50 mcg a day. History of gastritis and GERD on Prevacid 30 mg a day    History of diabetic neuropathy on Neurontin 200 mg daily    History of hyperlipidemia on ezetimibe 10 mg a day    History of hypertension on amlodipine 10 mg a day    History of severe back pain with radiculitis, history of herniated lumbar disc and radiculopathy. She is on gabapentin 200 mg a day and Norco 5/325 1 a day as needed. OARRS report reviewed and the last time she was given Norco was on 2021  when she received 30 pills. She is well aware of the addictive potential of this medication and it does help her somewhat with her pain. X-rays of her cervical spine and a CT scan of her lumbar spine  Were done last year. 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 She has degenerative disc disease and spinal stenosis of her lumbar spine with a herniated disc L4/L5 impressing on the left L5 nerve root which is where she has the most pain on the left side . We talked about referral to physiatry for possible epidurals. She said she had them in the past and they worked for a long time. She is not made her mind up which one she wants to go to. I gave her the names of Dr. Cornel Mast and Dr. Ankit Abernathy but patient wishes to discuss this with her family and then let me know which one she will go to.   In the meantime we will adjust gabapentin dose and see if this can help her    She complains of numbness and burning of her feet. She also has leg pain worse on the left. I wonder if she has underlying diabetic neuropathy as well as her left lumbar radiculopathy. I discussed obtaining EMG studies on her but patient refuses to do this she says she will think about it    History of urinary urgency and frequency. Saw urologist who said nothing could be done. Then saw surgeon in [place] Dr. Dara Leavitt who did surgery on her but unfortunately this did not work very well. History of gastroparesis. Saw initially . GI  in [facility] and was referred to Dr. Corina Valladares in [facility] [employer] who said that she was too old for surgery. She finally got her fasting blood work done today. We will wait for the results. She would like two handicap placard's    Review of Systems   Constitutional: Positive for fatigue. Negative for fever. HENT: Negative for congestion, ear pain, postnasal drip, sinus pressure and sinus pain. Eyes: Negative for pain, discharge, redness, itching and visual disturbance. Respiratory: Negative for apnea, cough, chest tightness, shortness of breath and wheezing. Cardiovascular: Negative for chest pain, palpitations and leg swelling. Gastrointestinal: Positive for constipation and nausea. Negative for abdominal distention, abdominal pain and vomiting. Endocrine: Negative for cold intolerance, heat intolerance and polyuria. Genitourinary: Positive for frequency and urgency. Negative for difficulty urinating, dysuria, flank pain and pelvic pain. Musculoskeletal: Positive for arthralgias, back pain, myalgias and neck pain. Negative for gait problem, joint swelling and neck stiffness.           Current Outpatient Medications:     Handicap Placard MISC, by Does not apply route Duration 5 years, Disp: 2 each, Rfl: 0    zoster recombinant adjuvanted vaccine (SHINGRIX) 50 MCG/0.5ML SUSR injection, Inject 0.5 mLs into the muscle once for 1 dose, Disp: 1 each, Rfl: 1    gabapentin (NEURONTIN) 300 MG capsule, Take 1 capsule by mouth nightly for 180 days.  Intended supply: 90 days, Disp: 90 capsule, Rfl: 1    insulin glargine (LANTUS SOLOSTAR) 100 UNIT/ML injection pen, Inject 12 Units into the skin daily, Disp: 5 pen, Rfl: 3    Lancets 28G MISC, Test blood sugar twice daily and as needed for symptoms of low blood sugar., Disp: 100 each, Rfl: 5    blood glucose test strips (ONETOUCH VERIO) strip, TEST 2 TIMES DAILY AS NEEDED (SYMPTOMS OF LOW BLOOD SUGAR) AS INSTRUCTED, Disp: 100 each, Rfl: 5    Ascorbic Acid (UNRULY-C PO), Take by mouth daily, Disp: , Rfl:     Insulin Pen Needle (B-D ULTRAFINE III SHORT PEN) 31G X 8 MM MISC, Inject 1 each as directed daily BD Ultra Fine, Disp: 150 each, Rfl: 2    diclofenac sodium (VOLTAREN) 1 % GEL, Apply 4 g topically 2 times daily, Disp: 250 g, Rfl: 5    guaiFENesin (MUCINEX) 600 MG extended release tablet, guaiFENesin Guaifenesin Active 1 TAB Oral Q12H August 18th, 2020 3:37pm 08-  [facility] ([id number]), Disp: , Rfl:     vitamin D (CHOLECALCIFEROL) 25 MCG (1000 UT) TABS tablet, Take 1,000 Units by mouth daily, Disp: , Rfl:     Calcium Ascorbate 500 MG TABS, 500 mg, Disp: , Rfl:     amLODIPine (NORVASC) 10 MG tablet, Take 1 tablet by mouth daily, Disp: 90 tablet, Rfl: 3    ezetimibe (ZETIA) 10 MG tablet, Take 1 tablet by mouth daily, Disp: 90 tablet, Rfl: 3    cyclobenzaprine (FLEXERIL) 5 MG tablet, Take 1 tablet by mouth 2 times daily as needed for Muscle spasms, Disp: 20 tablet, Rfl: 3    glimepiride (AMARYL) 1 MG tablet, Take 1 tablet by mouth 2 times daily, Disp: 180 tablet, Rfl: 3    lansoprazole (PREVACID) 30 MG delayed release capsule, Take 1 capsule by mouth daily, Disp: 90 capsule, Rfl: 3    levothyroxine (SYNTHROID) 50 MCG tablet, Take 1 tablet by mouth Daily Take 1 tab on Mon-Fri, not on Sat or Sun., Disp: 90 tablet, Rfl: 3    diclofenac sodium (VOLTAREN) 1 % GEL, Apply 2 g topically 2 times daily, Disp: 3 Tube, Rfl: 5    Blood Normal range of motion. General: Tenderness present. Comments: Tenderness to palpation of both shoulders, tenderness on palpation of her lower lumbar spine. Decreased range of motion to internal and external rotation of both shoulders, slightly decreased flexion of her dorsal spine. Lymphadenopathy:      Cervical: No cervical adenopathy. Skin:     General: Skin is warm and dry. Coloration: Skin is pale. Skin is not jaundiced. Neurological:      Mental Status: She is alert and oriented to person, place, and time. Motor: No weakness. Gait: Gait normal.      Comments: Decreased sensation to light touch from the mid calf down bilaterally. Psychiatric:         Mood and Affect: Mood normal.         Behavior: Behavior normal.         Thought Content: Thought content normal.          Last labs reviewed. ASSESSMENT & PLAN :   Problem List        Nervous and Auditory    Left lumbar radiculopathy - Primary       refer to physiatrist of choice. She will let me know which when she wants to see. He may want to have more recent imaging studies than the ones that were done last year. Relevant Medications    cyclobenzaprine (FLEXERIL) 5 MG tablet    Handicap Placard MISC    gabapentin (NEURONTIN) 300 MG capsule    Cervical radiculitis       await evaluation recommendation by physiatrist.  She continues her Norco for the pain. But use as little of it as possible. Relevant Medications    cyclobenzaprine (FLEXERIL) 5 MG tablet    gabapentin (NEURONTIN) 300 MG capsule    Neuropathy       probably secondary to her underlying long term diabetes. Patient refuses EMGs and neurological referral at this time. Try to increase gabapentin to 300 mg at bedtime. If it does not make her too sleepy she can actually increase it to 300 mg twice daily.   Let me know if she wants a larger prescription         Relevant Medications    gabapentin (NEURONTIN) 300 MG capsule       Musculoskeletal and Integument    Herniated lumbar intervertebral disc       refer to physiatry         Relevant Medications    Handicap Placard MISC       Other    Chronic pain       she still taking her Norco 5/325. OARRS report reviewed and her use is appropriate         Relevant Medications    cyclobenzaprine (FLEXERIL) 5 MG tablet    gabapentin (NEURONTIN) 300 MG capsule    Need for shingles vaccine       Shingrix vaccine sent to [facility]         Relevant Medications    zoster recombinant adjuvanted vaccine [facility]) 50 MCG/0.5ML SUSR injection           Return in about 6 weeks (around 6/24/2021), or neuropathy.  DM.       Shaina Miranda, DO  5/13/2021

## 2021-05-13 NOTE — ASSESSMENT & PLAN NOTE
refer to physiatrist of choice. She will let me know which when she wants to see. He may want to have more recent imaging studies than the ones that were done last year.

## 2021-05-13 NOTE — ASSESSMENT & PLAN NOTE
await evaluation recommendation by physiatrist.  She continues her Norco for the pain. But use as little of it as possible.

## 2021-08-10 ENCOUNTER — TELEPHONE (OUTPATIENT)
Dept: PRIMARY CARE CLINIC | Age: 86
End: 2021-08-10

## 2021-08-10 NOTE — TELEPHONE ENCOUNTER
----- Message from Avery Man sent at 8/9/2021  2:26 PM EDT -----  Subject: Message to Provider    QUESTIONS  Information for Provider? Patient is following up on appointment requested   for Dr Cayden Workman, message was put in back on 8/4/21 for follow up appointment   for 6 weeks and discuss her medications.   ---------------------------------------------------------------------------  --------------  [address]  What is the best way for the office to contact you? OK to leave message on   voicemail  Preferred Call Back Phone Number? [phone]  ---------------------------------------------------------------------------  --------------  SCRIPT ANSWERS  Relationship to Patient?  Self

## 2021-08-18 ENCOUNTER — TELEPHONE (OUTPATIENT)
Dept: PRIMARY CARE CLINIC | Age: 86
End: 2021-08-18

## 2021-08-18 NOTE — TELEPHONE ENCOUNTER
----- Message from [address] sent at 8/18/2021  8:20 AM EDT -----  Subject: Appointment Request    Reason for Call: Routine Existing Condition Follow Up (Diabetes)    QUESTIONS  Type of Appointment? Established Patient  Reason for appointment request? No appointments available during search  Additional Information for Provider? pt is wanting to reschedule this appt   and the pt would like to have an appt on Wednesday in the afternoon   ---------------------------------------------------------------------------  --------------  [address]  What is the best way for the office to contact you? OK to leave message on   voicemail  Preferred Call Back Phone Number? [phone]  ---------------------------------------------------------------------------  --------------  SCRIPT ANSWERS  Relationship to Patient? Self  Have your symptoms changed? No  (Is the patient requesting to be seen urgently for their symptoms?)? No  Is this follow up request related to routine Diabetes Management? Yes  Are you having any new concerns about your existing condition? No  Have you been diagnosed with, awaiting test results for, or told that you   are suspected of having COVID-19 (Coronavirus)? (If patient has tested   negative or was tested as a requirement for work, school, or travel and   not based on symptoms, answer no)? No  Do you currently have flu-like symptoms including fever or chills, cough,   shortness of breath, difficulty breathing, or new loss of taste or smell? No  Have you had close contact with someone with COVID-19 in the last 14 days? No  (Service Expert  click yes below to proceed with [facility] As Usual   Scheduling)?  Yes

## 2021-08-31 ENCOUNTER — OFFICE VISIT (OUTPATIENT)
Dept: PRIMARY CARE CLINIC | Age: 86
End: 2021-08-31
Payer: MEDICARE

## 2021-08-31 VITALS
HEART RATE: 102 BPM | TEMPERATURE: 97.5 F | WEIGHT: 168 LBS | SYSTOLIC BLOOD PRESSURE: 122 MMHG | DIASTOLIC BLOOD PRESSURE: 76 MMHG | BODY MASS INDEX: 31.72 KG/M2 | HEIGHT: 61 IN | OXYGEN SATURATION: 97 %

## 2021-08-31 DIAGNOSIS — M19.91 PRIMARY OSTEOARTHRITIS, UNSPECIFIED SITE: ICD-10-CM

## 2021-08-31 DIAGNOSIS — Z79.4 TYPE 2 DIABETES MELLITUS WITH HYPERGLYCEMIA, WITH LONG-TERM CURRENT USE OF INSULIN (HCC): Primary | ICD-10-CM

## 2021-08-31 DIAGNOSIS — H54.7 VISUAL PROBLEMS: ICD-10-CM

## 2021-08-31 DIAGNOSIS — G89.29 OTHER CHRONIC PAIN: ICD-10-CM

## 2021-08-31 DIAGNOSIS — K21.9 GASTROESOPHAGEAL REFLUX DISEASE, UNSPECIFIED WHETHER ESOPHAGITIS PRESENT: ICD-10-CM

## 2021-08-31 DIAGNOSIS — I10 ESSENTIAL HYPERTENSION: ICD-10-CM

## 2021-08-31 DIAGNOSIS — D72.828 OTHER ELEVATED WHITE BLOOD CELL (WBC) COUNT: ICD-10-CM

## 2021-08-31 DIAGNOSIS — G62.9 NEUROPATHY: ICD-10-CM

## 2021-08-31 DIAGNOSIS — E03.9 HYPOTHYROIDISM, UNSPECIFIED TYPE: ICD-10-CM

## 2021-08-31 DIAGNOSIS — E78.2 MIXED HYPERLIPIDEMIA: ICD-10-CM

## 2021-08-31 DIAGNOSIS — E11.65 TYPE 2 DIABETES MELLITUS WITH HYPERGLYCEMIA, WITH LONG-TERM CURRENT USE OF INSULIN (HCC): Primary | ICD-10-CM

## 2021-08-31 PROCEDURE — 99214 OFFICE O/P EST MOD 30 MIN: CPT | Performed by: INTERNAL MEDICINE

## 2021-08-31 RX ORDER — LEVOTHYROXINE SODIUM 0.05 MG/1
50 TABLET ORAL DAILY
Qty: 90 TABLET | Refills: 3 | Status: SHIPPED
Start: 2021-08-31 | End: 2021-12-09

## 2021-08-31 RX ORDER — EZETIMIBE 10 MG/1
10 TABLET ORAL DAILY
Qty: 90 TABLET | Refills: 3 | Status: SHIPPED
Start: 2021-08-31 | End: 2022-09-09 | Stop reason: SDUPTHER

## 2021-08-31 RX ORDER — GLIMEPIRIDE 1 MG/1
1 TABLET ORAL 2 TIMES DAILY
Qty: 180 TABLET | Refills: 3 | Status: SHIPPED
Start: 2021-08-31 | End: 2022-05-24 | Stop reason: SDUPTHER

## 2021-08-31 RX ORDER — LANSOPRAZOLE 30 MG/1
30 CAPSULE, DELAYED RELEASE ORAL DAILY
Qty: 90 CAPSULE | Refills: 3 | Status: SHIPPED
Start: 2021-08-31 | End: 2021-12-23 | Stop reason: SDUPTHER

## 2021-08-31 RX ORDER — TRAMADOL HYDROCHLORIDE 50 MG/1
50 TABLET ORAL EVERY 6 HOURS PRN
Qty: 120 TABLET | Refills: 0 | Status: SHIPPED
Start: 2021-08-31 | End: 2021-11-17 | Stop reason: SDUPTHER

## 2021-08-31 RX ORDER — AMLODIPINE BESYLATE 10 MG/1
10 TABLET ORAL DAILY
Qty: 90 TABLET | Refills: 3 | Status: SHIPPED
Start: 2021-08-31 | End: 2022-09-09 | Stop reason: SDUPTHER

## 2021-08-31 RX ORDER — GABAPENTIN 300 MG/1
300 CAPSULE ORAL NIGHTLY
Qty: 90 CAPSULE | Refills: 3 | Status: SHIPPED
Start: 2021-08-31 | End: 2021-12-09

## 2021-08-31 SDOH — ECONOMIC STABILITY: FOOD INSECURITY: WITHIN THE PAST 12 MONTHS, THE FOOD YOU BOUGHT JUST DIDN'T LAST AND YOU DIDN'T HAVE MONEY TO GET MORE.: NEVER TRUE

## 2021-08-31 SDOH — ECONOMIC STABILITY: FOOD INSECURITY: WITHIN THE PAST 12 MONTHS, YOU WORRIED THAT YOUR FOOD WOULD RUN OUT BEFORE YOU GOT MONEY TO BUY MORE.: NEVER TRUE

## 2021-08-31 ASSESSMENT — ENCOUNTER SYMPTOMS
WHEEZING: 0
EYE PAIN: 0
CHEST TIGHTNESS: 0
NAUSEA: 1
BACK PAIN: 1
APNEA: 0
VOMITING: 0
ABDOMINAL PAIN: 0
EYE REDNESS: 0
SINUS PAIN: 0
SINUS PRESSURE: 0
SHORTNESS OF BREATH: 0
ABDOMINAL DISTENTION: 0
CONSTIPATION: 1
COUGH: 0
EYE DISCHARGE: 0
EYE ITCHING: 0

## 2021-08-31 ASSESSMENT — SOCIAL DETERMINANTS OF HEALTH (SDOH): HOW HARD IS IT FOR YOU TO PAY FOR THE VERY BASICS LIKE FOOD, HOUSING, MEDICAL CARE, AND HEATING?: NOT HARD AT ALL

## 2021-08-31 NOTE — ASSESSMENT & PLAN NOTE
monitor blood sugars and continue 12 units Lantus daily and glimepiride 1 mg twice daily.   Check hemoglobin A1c and urine microalbumin creatinine ratio

## 2021-08-31 NOTE — ASSESSMENT & PLAN NOTE
discontinue Norco at her request and switch her to tramadol. OARRS report reviewed. We will see if she can use the tramadol as directed.

## 2021-08-31 NOTE — ASSESSMENT & PLAN NOTE
Blood pressures are stable. Continue medications and monitor blood pressures at home.  Call office if systolics are over 169 over diastolics over 90.  check fasting CMP

## 2021-08-31 NOTE — PROGRESS NOTES
2021    Name: Misti Clarke : 1935 Sex: female  Age: 81 y.o. Subjective:  Chief Complaint   Patient presents with    Hypertension     medication refill        Hypertension  Associated symptoms include neck pain. Pertinent negatives include no chest pain, palpitations or shortness of breath. .    She has a history of type 2 diabetes mellitus and takes her glimepiride most of the time but sometimes if her blood sugar is low she will not take it. She is also on Lantus 12 units daily. Blood sugars have been anywhere from 100-200 but when she gets sick it went higher than that. History of hypothyroidism on levothyroxine 50 mcg a day. History of gastritis and GERD on Prevacid 30 mg a day    History of diabetic neuropathy on Neurontin 200 mg daily    History of hyperlipidemia on ezetimibe 10 mg a day    History of hypertension on amlodipine 10 mg a day    History of severe back pain with radiculitis, history of herniated lumbar disc and radiculopathy. She is on gabapentin which we increased to 300 mg a day and Norco 5/325 1 a day as needed. OARRS report reviewed and the last time she was given Norco was on 2021  when she received 30 pills. She is well aware of the addictive potential of this medication and it does help her somewhat with her pain. She says the Williemae Adore is causing her to have a lot of muscle spasms so she does not want to take it any longer. She would like to try something else. I told her I will not put her on Percocet and that if she goes anything stronger than Norco she has to see pain management. I am going to try her on tramadol 50 mg every 6 hours and see if that works. X-rays of her cervical spine and a CT scan of her lumbar spine  Were done last year. 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   She has degenerative disc disease and spinal stenosis of her lumbar spine with a herniated disc L4/L5 impressing on the left L5 nerve root which is where she has the most pain on the left side . We talked about referral to physiatry for possible epidurals. She said she had them in the past and they worked for a long time. She is not made her mind up which one she wants to go to. I gave her the names of Dr. Anni Burns and Dr. Hima Beaulieu but patient wishes to discuss this with her family and then let me know which one she will go to. She saw Dr. Andrea Domínguez earlier this year and he x-rayed her shoulder. Reviewed report. He also gave her a shot which did help somewhat. She is going back to see him    She complains of numbness and burning of her feet. She also has leg pain worse on the left. I wonder if she has underlying diabetic neuropathy as well as her left lumbar radiculopathy. I discussed obtaining EMG studies on her but patient refuses to do this she says she will think about it    History of urinary urgency and frequency. Saw urologist who said nothing could be done. Then saw surgeon in [place] Dr. Pritesh Shaver who did surgery on her but unfortunately this did not work very well. History of gastroparesis. Saw initially . MARIELOS  in [facility] and was referred to Dr. Allegra Ruano in [facility] [place] who said that she was too old for surgery. She will get her fasting blood work done at [facility]. Hussein Costa She would like to see Dr. Issa Agustin who did her cataract surgery. She is having trouble seeing numbers. Review of Systems   Constitutional: Positive for fatigue. Negative for fever. HENT: Negative for congestion, ear pain, postnasal drip, sinus pressure and sinus pain. Eyes: Positive for visual disturbance. Negative for pain, discharge, redness and itching. Has trouble reading numbers   Respiratory: Negative for apnea, cough, chest tightness, shortness of breath and wheezing. Cardiovascular: Negative for chest pain, palpitations and leg swelling. Gastrointestinal: Positive for constipation and nausea.  Negative for abdominal distention, abdominal pain and vomiting. Endocrine: Negative for cold intolerance, heat intolerance and polyuria. Genitourinary: Positive for frequency and urgency. Negative for difficulty urinating, dysuria, flank pain and pelvic pain. Musculoskeletal: Positive for arthralgias, back pain, myalgias and neck pain. Negative for gait problem, joint swelling and neck stiffness. Neurological: Negative for dizziness, tremors, seizures and syncope. Psychiatric/Behavioral: Negative for agitation, confusion and sleep disturbance. Current Outpatient Medications:     CALCIUM-MAGNESIUM-ZINC PO, Take by mouth, Disp: , Rfl:     amLODIPine (NORVASC) 10 MG tablet, Take 1 tablet by mouth daily, Disp: 90 tablet, Rfl: 3    diclofenac sodium (VOLTAREN) 1 % GEL, Apply 4 g topically 2 times daily, Disp: 250 g, Rfl: 5    ezetimibe (ZETIA) 10 MG tablet, Take 1 tablet by mouth daily, Disp: 90 tablet, Rfl: 3    gabapentin (NEURONTIN) 300 MG capsule, Take 1 capsule by mouth nightly for 180 days. Intended supply: 90 days, Disp: 90 capsule, Rfl: 3    glimepiride (AMARYL) 1 MG tablet, Take 1 tablet by mouth 2 times daily, Disp: 180 tablet, Rfl: 3    lansoprazole (PREVACID) 30 MG delayed release capsule, Take 1 capsule by mouth daily, Disp: 90 capsule, Rfl: 3    levothyroxine (SYNTHROID) 50 MCG tablet, Take 1 tablet by mouth Daily Take 1 tab on Mon-Fri, not on Sat or Sun., Disp: 90 tablet, Rfl: 3    traMADol (ULTRAM) 50 MG tablet, Take 1 tablet by mouth every 6 hours as needed for Pain for up to 30 days. , Disp: 120 tablet, Rfl: 0    Handicap Placard MISC, by Does not apply route Duration 5 years, Disp: 2 each, Rfl: 0    insulin glargine (LANTUS SOLOSTAR) 100 UNIT/ML injection pen, Inject 12 Units into the skin daily, Disp: 5 pen, Rfl: 3    Lancets 28G MISC, Test blood sugar twice daily and as needed for symptoms of low blood sugar., Disp: 100 each, Rfl: 5    blood glucose test strips (ONETOUCH VERIO) strip, TEST 2 TIMES DAILY AS NEEDED (SYMPTOMS OF LOW BLOOD SUGAR) AS INSTRUCTED, Disp: 100 each, Rfl: 5    neomycin-polymyxin-hydrocortisone (CORTISPORIN) 3.5-25350-0 otic solution, Place 3 drops into both ears 3 times daily for 10 days Instill into both  Ears, Disp: 1 Bottle, Rfl: 1    Ascorbic Acid (UNRULY-C PO), Take by mouth daily, Disp: , Rfl:     Insulin Pen Needle (B-D ULTRAFINE III SHORT PEN) 31G X 8 MM MISC, Inject 1 each as directed daily BD Ultra Fine, Disp: 150 each, Rfl: 2    guaiFENesin (MUCINEX) 600 MG extended release tablet, guaiFENesin Guaifenesin Active 1 TAB Oral Q12H August 18th, 2020 3:37pm 08-  [facility] ([id number]), Disp: , Rfl:     vitamin D (CHOLECALCIFEROL) 25 MCG (1000 UT) TABS tablet, Take 1,000 Units by mouth daily, Disp: , Rfl:     Calcium Ascorbate 500 MG TABS, 500 mg, Disp: , Rfl:     cyclobenzaprine (FLEXERIL) 5 MG tablet, Take 1 tablet by mouth 2 times daily as needed for Muscle spasms, Disp: 20 tablet, Rfl: 3    Blood Glucose Monitoring Suppl (BLOOD GLUCOSE MONITOR SYSTEM) w/Device KIT, Test blood sugar twice daily, and as needed for symptoms of low blood sugar.  (Patient is insulin dependant.), Disp: 1 kit, Rfl: 0     Allergies   Allergen Reactions    Hydrocodone-Acetaminophen      Other reaction(s): Unknown    Ibuprofen     Lisinopril      Other reaction(s): Cough    Nsaids Other (See Comments)    Pregabalin Swelling    Simvastatin     Statins Other (See Comments)     Muscle weakness         Past Medical History:   Diagnosis Date    Anemia     Chronic kidney disease     Stage III    Chronic pain     Cirrhosis (HCC)     Depression     Fibromyalgia     Gastroparesis     Pernicious anemia     Thyroid nodule        Health Maintenance Due   Topic Date Due    Shingles Vaccine (3 of 3) 08/24/2021        Patient Active Problem List   Diagnosis    Essential hypertension    Slow transit constipation    Type 2 diabetes mellitus (HCC)    Gastroparesis    Chronic pain    Fibromyalgia    Thyroid nodule    Pernicious anemia    Hypothyroidism    Abnormal liver enzymes    Bladder prolapse, female, acquired    Urge incontinence    Fatty liver    Depression    Mixed hyperlipidemia    Chronic kidney disease    Dysuria    Overactive bladder    Herniated lumbar intervertebral disc    Left lumbar radiculopathy    Gastroesophageal reflux disease    Screening mammogram for high-risk patient    Muscle spasm    Acute pain of right shoulder    Vitamin B12 deficiency    Cervical radiculitis    Acute non-recurrent pansinusitis    Vitamin D deficiency    Need for shingles vaccine    Neuropathy    Visual problems        Past Surgical History:   Procedure Laterality Date    APPENDECTOMY      CHOLECYSTECTOMY      EYE SURGERY      cataract OU    HEMORRHOID SURGERY      HYSTERECTOMY, TOTAL ABDOMINAL      JOINT REPLACEMENT Left     Knee     KNEE ARTHROPLASTY Right     SUBTOTAL COLECTOMY          Family History   Problem Relation Age of Onset    Other Mother         cva    High Blood Pressure Mother     Other Father         heart disease         Social History     Tobacco Use    Smoking status: Never Smoker    Smokeless tobacco: Never Used   Substance Use Topics    Alcohol use: Not Currently    Drug use: Yes     Comment: Norco 5/325        Objective  Vitals:    08/31/21 1521   BP: 122/76   Pulse: 102   Temp: 97.5 °F (36.4 °C)   SpO2: 97%   Weight: 168 lb (76.2 kg)   Height: 5' 1\" (1.549 m)        Exam:  Physical Exam  Vitals reviewed. Constitutional:       General: She is not in acute distress. Appearance: Normal appearance. She is obese. She is not ill-appearing. HENT:      Head: Normocephalic and atraumatic. Comments: Tender to palpation maxillary sinus on the right     Right Ear: External ear normal.      Left Ear: External ear normal.      Ears:      Comments: Right and left tympanic membranes were slightly retracted and erythematous. Call office if systolics are over 384 over diastolics over 90.  check fasting CMP         Relevant Medications    amLODIPine (NORVASC) 10 MG tablet    Other Relevant Orders    Comprehensive Metabolic Panel       Digestive    Gastroesophageal reflux disease       continue PPI         Relevant Medications    lansoprazole (PREVACID) 30 MG delayed release capsule       Endocrine    Type 2 diabetes mellitus ([facility] [address] - Primary       monitor blood sugars and continue 12 units Lantus daily and glimepiride 1 mg twice daily. Check hemoglobin A1c and urine microalbumin creatinine ratio         Relevant Medications    Blood Glucose Monitoring Suppl (BLOOD GLUCOSE MONITOR SYSTEM) w/Device KIT    Insulin Pen Needle (B-D ULTRAFINE III SHORT PEN) 31G X 8 MM MISC    insulin glargine (LANTUS SOLOSTAR) 100 UNIT/ML injection pen    Lancets 28G MISC    blood glucose test strips (ONETOUCH VERIO) strip    glimepiride (AMARYL) 1 MG tablet    Other Relevant Orders    Comprehensive Metabolic Panel    Hemoglobin A1C    Microalbumin / Creatinine Urine Ratio    Hypothyroidism       continue levothyroxine 50 mcg a day and check TSH and free T4         Relevant Medications    levothyroxine (SYNTHROID) 50 MCG tablet    Other Relevant Orders    T4, Free    TSH without Reflex       Nervous and Auditory    Neuropathy       continue increased dose of gabapentin 300 mg a day as it is working better for her         Relevant Medications    gabapentin (NEURONTIN) 300 MG capsule       Other    Chronic pain       discontinue Norco at her request and switch her to tramadol. OARRS report reviewed. We will see if she can use the tramadol as directed.          Relevant Medications    cyclobenzaprine (FLEXERIL) 5 MG tablet    gabapentin (NEURONTIN) 300 MG capsule    traMADol (ULTRAM) 50 MG tablet    Mixed hyperlipidemia       watch diet will check lipids and continue ezetimibe as she is intolerant to statins         Relevant Medications    amLODIPine (NORVASC) 10 MG tablet    ezetimibe (ZETIA) 10 MG tablet    Other Relevant Orders    Lipid Panel    Visual problems       refer to Dr. Erika Arias for further evaluation. She has seen them before         Relevant Orders    External Referral To Ophthalmology           Return in about 3 months (around 11/30/2021), or DM.        Cadence Glynn, DO  8/31/2021

## 2021-11-11 LAB — DIABETIC RETINOPATHY: NEGATIVE

## 2021-11-17 DIAGNOSIS — G89.29 OTHER CHRONIC PAIN: ICD-10-CM

## 2021-11-17 DIAGNOSIS — M19.91 PRIMARY OSTEOARTHRITIS, UNSPECIFIED SITE: ICD-10-CM

## 2021-11-17 DIAGNOSIS — G62.9 NEUROPATHY: ICD-10-CM

## 2021-11-17 RX ORDER — TRAMADOL HYDROCHLORIDE 50 MG/1
50 TABLET ORAL EVERY 6 HOURS PRN
Qty: 120 TABLET | Refills: 0 | Status: SHIPPED
Start: 2021-11-17 | End: 2022-01-31 | Stop reason: SDUPTHER

## 2021-11-17 NOTE — TELEPHONE ENCOUNTER
Name of Medication(s) Requested:  tramadol    Pharmacy Requested:   [facility]    Medication(s) pended? [x] Yes  [] No    Last Appointment:  8/31/2021    Future appts:  Future Appointments   Date Time Provider Gerry Osorio   11/30/2021  3:00 PM [address] [facility], DO [facility]          Does patient need call back?   [] Yes  [x] No

## 2021-11-27 DIAGNOSIS — E11.65 TYPE 2 DIABETES MELLITUS WITH HYPERGLYCEMIA, WITH LONG-TERM CURRENT USE OF INSULIN (HCC): ICD-10-CM

## 2021-11-27 DIAGNOSIS — Z79.4 TYPE 2 DIABETES MELLITUS WITH HYPERGLYCEMIA, WITH LONG-TERM CURRENT USE OF INSULIN (HCC): ICD-10-CM

## 2021-11-29 DIAGNOSIS — Z79.4 TYPE 2 DIABETES MELLITUS WITH HYPERGLYCEMIA, WITH LONG-TERM CURRENT USE OF INSULIN (HCC): ICD-10-CM

## 2021-11-29 DIAGNOSIS — E11.65 TYPE 2 DIABETES MELLITUS WITH HYPERGLYCEMIA, WITH LONG-TERM CURRENT USE OF INSULIN (HCC): ICD-10-CM

## 2021-11-29 RX ORDER — LANCETS 23 GAUGE
EACH MISCELLANEOUS
Qty: 100 EACH | Refills: 5 | Status: SHIPPED
Start: 2021-11-29 | End: 2022-05-24 | Stop reason: SDUPTHER

## 2021-11-29 NOTE — TELEPHONE ENCOUNTER
----- Message from Brad Velasquez sent at 11/29/2021  9:48 AM EST -----  Subject: Refill Request    QUESTIONS  Name of Medication? Lancets 28G MISC  Patient-reported dosage and instructions? 28 G  How many days do you have left? 0  Preferred Pharmacy? [facility]   Pharmacy phone number (if available)? [phone]  Additional Information for Provider? Patient has an appt on 12/23/21   (first available when rescheduling her appt today) and she is completely   out of these. Can someone please follow up. Said when she went to    the pharmacy told her she does not have any refills. ---------------------------------------------------------------------------  --------------  Jenny WEIR  What is the best way for the office to contact you? OK to leave message on   voicemail  Preferred Call Back Phone Number?  [phone]

## 2021-12-08 DIAGNOSIS — G62.9 NEUROPATHY: ICD-10-CM

## 2021-12-08 DIAGNOSIS — E03.9 HYPOTHYROIDISM, UNSPECIFIED TYPE: ICD-10-CM

## 2021-12-09 RX ORDER — GABAPENTIN 300 MG/1
CAPSULE ORAL
Qty: 90 CAPSULE | Refills: 1 | Status: SHIPPED
Start: 2021-12-09 | End: 2021-12-23

## 2021-12-09 RX ORDER — LEVOTHYROXINE SODIUM 0.05 MG/1
TABLET ORAL
Qty: 90 TABLET | Refills: 3 | Status: SHIPPED
Start: 2021-12-09 | End: 2021-12-23 | Stop reason: SDUPTHER

## 2021-12-14 LAB
A/G RATIO: 1.3 RATIO (ref 1.1–2.2)
ALBUMIN SERPL-MCNC: 4.4 G/DL (ref 3.4–4.8)
ALP BLD-CCNC: 73 U/L (ref 42–121)
ALT SERPL-CCNC: 13 U/L (ref 10–54)
ANION GAP SERPL CALCULATED.3IONS-SCNC: 11 MEQ/L (ref 3–11)
AST SERPL-CCNC: 19 U/L (ref 10–41)
BASOPHILS ABSOLUTE: 0.1 K/UL (ref 0–0.2)
BASOPHILS RELATIVE PERCENT: 0.6 % (ref 0–1.5)
BILIRUB SERPL-MCNC: 0.7 MG/DL (ref 0.3–1.5)
BUN BLDV-MCNC: 26 MG/DL (ref 8–21)
CALCIUM SERPL-MCNC: 9.5 MG/DL (ref 8.5–10.5)
CHLORIDE BLD-SCNC: 102 MEQ/L (ref 98–107)
CHOLESTEROL: 177 MG/DL (ref 140–200)
CO2: 26 MEQ/L (ref 21–31)
CREAT SERPL-MCNC: 1.4 MG/DL (ref 0.4–1)
CREATININE + EGFR PANEL: 43 ML/MIN
CREATININE URINE: 78.9 MG/DL (ref 22–328)
DIFFERENTIAL, MANUAL: NO
EOSINOPHILS ABSOLUTE: 0.2 K/UL (ref 0–0.33)
EOSINOPHILS RELATIVE PERCENT: 1.7 % (ref 0–3)
GFR NON-AFRICAN AMERICAN: 36 ML/MIN
GLOBULIN: 3.3 G/DL (ref 1.9–3.9)
GLUCOSE BLD-MCNC: 162 MG/DL (ref 70–99)
HCT VFR BLD CALC: 40.5 % (ref 36–44)
HDLC SERPL-MCNC: 51 MG/DL (ref 35–85)
HEMOGLOBIN: 12.7 G/DL (ref 12–15)
LDL CHOLESTEROL: 93 MG/DL
LDL/HDL RATIO: 1.8 RATIO
LYMPHOCYTES ABSOLUTE: 1.5 K/UL (ref 1.1–4.8)
LYMPHOCYTES RELATIVE PERCENT: 14.8 % (ref 24–44)
MCH RBC QN AUTO: 24.2 PG (ref 28–34)
MCHC RBC AUTO-ENTMCNC: 31.2 G/DL (ref 33–37)
MCV RBC AUTO: 77.7 FL (ref 80–100)
MICROALBUMIN UR-MCNC: 46.1 UG/ML
MICROALBUMIN/CREAT UR-RTO: 58.4 MG/G
MONOCYTES ABSOLUTE: 0.7 K/UL (ref 0.2–0.7)
MONOCYTES RELATIVE PERCENT: 7.1 % (ref 3.4–9)
NEUTROPHILS ABSOLUTE: 7.8 K/UL (ref 1.83–8.7)
PDW BLD-RTO: 14.7 % (ref 10.9–14.3)
PLATELET # BLD: 305 K/UL (ref 150–450)
PMV BLD AUTO: 8.4 FL (ref 7.4–10.4)
POTASSIUM SERPL-SCNC: 4.4 MEQ/L (ref 3.6–5)
RBC # BLD: 5.22 M/UL (ref 4–4.9)
SEGMENTED NEUTROPHILS RELATIVE PERCENT: 75.8 % (ref 40–74)
SODIUM BLD-SCNC: 139 MEQ/L (ref 135–145)
T4 FREE: 0.91 NG/DL (ref 0.61–1.12)
TOTAL PROTEIN: 7.7 G/DL (ref 5.9–7.8)
TRIGL SERPL-MCNC: 137 MG/DL (ref 41–189)
TSH SERPL DL<=0.05 MIU/L-ACNC: 0.69 UIU/ML (ref 0.34–5.6)
WBC # BLD: 10.3 K/UL (ref 4.5–11)

## 2021-12-15 LAB
ESTIMATED AVERAGE GLUCOSE: 146 MG/DL
HBA1C MFR BLD: 6.7 % (ref 4–6)

## 2021-12-23 ENCOUNTER — OFFICE VISIT (OUTPATIENT)
Dept: PRIMARY CARE CLINIC | Age: 86
End: 2021-12-23
Payer: MEDICARE

## 2021-12-23 VITALS
OXYGEN SATURATION: 96 % | SYSTOLIC BLOOD PRESSURE: 136 MMHG | HEART RATE: 97 BPM | TEMPERATURE: 97.3 F | BODY MASS INDEX: 31.53 KG/M2 | HEIGHT: 61 IN | DIASTOLIC BLOOD PRESSURE: 66 MMHG | WEIGHT: 167 LBS

## 2021-12-23 DIAGNOSIS — Z79.4 TYPE 2 DIABETES MELLITUS WITH HYPERGLYCEMIA, WITH LONG-TERM CURRENT USE OF INSULIN (HCC): ICD-10-CM

## 2021-12-23 DIAGNOSIS — E61.1 IRON DEFICIENCY: ICD-10-CM

## 2021-12-23 DIAGNOSIS — M25.511 CHRONIC PAIN OF BOTH SHOULDERS: Primary | ICD-10-CM

## 2021-12-23 DIAGNOSIS — Z23 NEED FOR INFLUENZA VACCINATION: ICD-10-CM

## 2021-12-23 DIAGNOSIS — E03.9 HYPOTHYROIDISM, UNSPECIFIED TYPE: ICD-10-CM

## 2021-12-23 DIAGNOSIS — M54.16 LEFT LUMBAR RADICULOPATHY: ICD-10-CM

## 2021-12-23 DIAGNOSIS — G89.29 CHRONIC PAIN OF BOTH SHOULDERS: Primary | ICD-10-CM

## 2021-12-23 DIAGNOSIS — E03.9 ACQUIRED HYPOTHYROIDISM: ICD-10-CM

## 2021-12-23 DIAGNOSIS — E78.2 MIXED HYPERLIPIDEMIA: ICD-10-CM

## 2021-12-23 DIAGNOSIS — I10 ESSENTIAL HYPERTENSION: ICD-10-CM

## 2021-12-23 DIAGNOSIS — N18.30 STAGE 3 CHRONIC KIDNEY DISEASE, UNSPECIFIED WHETHER STAGE 3A OR 3B CKD (HCC): ICD-10-CM

## 2021-12-23 DIAGNOSIS — G62.9 NEUROPATHY: ICD-10-CM

## 2021-12-23 DIAGNOSIS — G89.4 CHRONIC PAIN SYNDROME: ICD-10-CM

## 2021-12-23 DIAGNOSIS — M25.512 CHRONIC PAIN OF BOTH SHOULDERS: Primary | ICD-10-CM

## 2021-12-23 DIAGNOSIS — K21.9 GASTROESOPHAGEAL REFLUX DISEASE, UNSPECIFIED WHETHER ESOPHAGITIS PRESENT: ICD-10-CM

## 2021-12-23 DIAGNOSIS — E11.65 TYPE 2 DIABETES MELLITUS WITH HYPERGLYCEMIA, WITH LONG-TERM CURRENT USE OF INSULIN (HCC): ICD-10-CM

## 2021-12-23 PROCEDURE — G0008 ADMIN INFLUENZA VIRUS VAC: HCPCS | Performed by: INTERNAL MEDICINE

## 2021-12-23 PROCEDURE — 99214 OFFICE O/P EST MOD 30 MIN: CPT | Performed by: INTERNAL MEDICINE

## 2021-12-23 PROCEDURE — 90694 VACC AIIV4 NO PRSRV 0.5ML IM: CPT | Performed by: INTERNAL MEDICINE

## 2021-12-23 RX ORDER — LANSOPRAZOLE 30 MG/1
30 CAPSULE, DELAYED RELEASE ORAL DAILY
Qty: 90 CAPSULE | Refills: 3 | Status: SHIPPED
Start: 2021-12-23 | End: 2022-09-21 | Stop reason: SDUPTHER

## 2021-12-23 RX ORDER — GABAPENTIN 300 MG/1
300 CAPSULE ORAL 2 TIMES DAILY
Qty: 180 CAPSULE | Refills: 1 | Status: SHIPPED
Start: 2021-12-23 | End: 2022-05-24 | Stop reason: SDUPTHER

## 2021-12-23 RX ORDER — LEVOTHYROXINE SODIUM 0.05 MG/1
50 TABLET ORAL DAILY
Qty: 90 TABLET | Refills: 3 | Status: SHIPPED
Start: 2021-12-23 | End: 2022-09-09 | Stop reason: SDUPTHER

## 2021-12-23 ASSESSMENT — ENCOUNTER SYMPTOMS
EYE ITCHING: 0
BACK PAIN: 1
APNEA: 0
CHEST TIGHTNESS: 0
WHEEZING: 0
ABDOMINAL PAIN: 0
EYE DISCHARGE: 0
EYE PAIN: 0
SHORTNESS OF BREATH: 0
EYE REDNESS: 0
COUGH: 0
CONSTIPATION: 1
VOMITING: 0
ABDOMINAL DISTENTION: 0
NAUSEA: 1
SINUS PRESSURE: 0
SINUS PAIN: 0

## 2021-12-23 NOTE — ASSESSMENT & PLAN NOTE
continue her insulin as before. Monitor blood sugars and watch for low blood sugar spells.   Continue glimepiride 1 mg twice daily

## 2021-12-23 NOTE — PROGRESS NOTES
2021    Name: Burt Heredia : 1935 Sex: female  Age: 81 y.o. Subjective:  Chief Complaint   Patient presents with    Diabetes        Hypertension  Associated symptoms include neck pain. Pertinent negatives include no chest pain, palpitations or shortness of breath. .    She has a history of type 2 diabetes mellitus and takes her glimepiride most of the time but sometimes if her blood sugar is low she will not take it. She is also on Lantus 12 units daily. Blood sugars have been anywhere from 100-200 but when she gets sick it went higher than that. Hemoglobin A1c was 6.7%. Sherryle Moder History of hypothyroidism on levothyroxine 50 mcg a day. TSH and free T4 were normal    History of gastritis and GERD on Prevacid 30 mg a day    History of diabetic neuropathy on gabapentin 300 mg a day. She still has problems and would like to increase it to 300 mg twice daily. We are going to send in a new prescription. History of hyperlipidemia on ezetimibe 10 mg a day  Total cholesterol 177 LDL cholesterol 93, HDL cholesterol 51 and triglycerides 137. History of hypertension on amlodipine 10 mg a day. Blood pressures are acceptable at home. She has a history of chronic kidney disease and her last creatinine was 1.4, BUN 26. Estimated GFR was 36. This is decreased from previous. We will need to watch this as she is on Voltaren gel 3-4 times a day    She had a history of iron deficiency anemia at one time. Her hemoglobin is not bad but her indices are microcytic and hypochromic. We will get fasting iron studies on her in the near future. She was unable to tolerate IV iron if her iron is low we will may need to send her to hematologist for further treatment. .    History of severe back pain with radiculitis, history of herniated lumbar disc and radiculopathy. She is on gabapentin which we increased to 300 mg a day and Norco 5/325 1 a day as needed.   OARRS report reviewed and the last time she was given Snehal Brandon was on 4/28 /2021  when she received 30 pills. She is well aware of the addictive potential of this medication and it does help her somewhat with her pain. She says the Snehal Brandon is causing her to have a lot of muscle spasms so she does not want to take it any longer. She would like to try something else. I told her I will not put her on Percocet and that if she goes anything stronger than Norco she has to see pain management. I am going to try her on tramadol 50 mg every 6 hours and see if that works. Her last tramadol prescription was filled on 11/17/2021 of 120 pills. X-rays of her cervical spine and a CT scan of her lumbar spine  Were done last year. 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 She has degenerative disc disease and spinal stenosis of her lumbar spine with a herniated disc L4/L5 impressing on the left L5 nerve root which is where she has the most pain on the left side . We talked about referral to physiatry for possible epidurals. She said she had them in the past and they worked for a long time. She is not made her mind up which one she wants to go to. I gave her the names of Dr. Medhat Street and Dr. Maliha Narvaez but patient wishes to discuss this with her family and then let me know which one she will go to. She saw Dr. Maureen Barone earlier this year and he x-rayed her shoulder. Reviewed report. He also gave her a shot which did help somewhat. She is going back to see him. She said increased pain in her right shoulder now is having pain in her left shoulder. She complains of numbness and burning of her feet. She also has leg pain worse on the left. I wonder if she has underlying diabetic neuropathy as well as her left lumbar radiculopathy. I discussed obtaining EMG studies on her but patient refuses to do this she says she will think about it    History of urinary urgency and frequency. Saw urologist who said nothing could be done.   Then saw surgeon in [place] Dr. Brenda Swenson who did surgery on her but unfortunately this did not work very well. History of gastroparesis. Saw initially . GI  in [facility] and was referred to Dr. Paddy Burrell in [place] who said that she was too old for surgery. She would like to see Dr. Ankit Ricketts who did her cataract surgery. She is having trouble seeing numbers. She has had her Covid vaccine along with her booster shot. She needs her flu shot    Review of Systems   Constitutional: Positive for fatigue. Negative for fever. HENT: Negative for congestion, ear pain, postnasal drip, sinus pressure and sinus pain. Eyes: Positive for visual disturbance. Negative for pain, discharge, redness and itching. Has trouble reading numbers   Respiratory: Negative for apnea, cough, chest tightness, shortness of breath and wheezing. Cardiovascular: Negative for chest pain, palpitations and leg swelling. Gastrointestinal: Positive for constipation and nausea. Negative for abdominal distention, abdominal pain and vomiting. Endocrine: Negative for cold intolerance, heat intolerance and polyuria. Genitourinary: Positive for frequency and urgency. Negative for difficulty urinating, dysuria, flank pain and pelvic pain. Musculoskeletal: Positive for arthralgias, back pain, myalgias and neck pain. Negative for gait problem, joint swelling and neck stiffness. Neurological: Negative for dizziness, tremors, seizures and syncope. Psychiatric/Behavioral: Negative for agitation, confusion and sleep disturbance.           Current Outpatient Medications:     lansoprazole (PREVACID) 30 MG delayed release capsule, Take 1 capsule by mouth daily, Disp: 90 capsule, Rfl: 3    levothyroxine (SYNTHROID) 50 MCG tablet, Take 1 tablet by mouth Daily Monday to Friday (not Saturday or Sunday), Disp: 90 tablet, Rfl: 3    diclofenac sodium (VOLTAREN) 1 % GEL, Apply 2 g topically 4 times daily, Disp: 150 g, Rfl: 3    gabapentin (NEURONTIN) 300 MG capsule, Take 1 capsule by mouth 2 times daily for 90 days. , Disp: 180 capsule, Rfl: 1    PENTIPS 31G X 8 MM MISC, USE AS DIRECTED DAILY, Disp: 100 each, Rfl: 2    Lancets 28G MISC, Test blood sugar twice daily and as needed for symptoms of low blood sugar., Disp: 100 each, Rfl: 5    CALCIUM-MAGNESIUM-ZINC PO, Take by mouth, Disp: , Rfl:     amLODIPine (NORVASC) 10 MG tablet, Take 1 tablet by mouth daily, Disp: 90 tablet, Rfl: 3    ezetimibe (ZETIA) 10 MG tablet, Take 1 tablet by mouth daily, Disp: 90 tablet, Rfl: 3    glimepiride (AMARYL) 1 MG tablet, Take 1 tablet by mouth 2 times daily, Disp: 180 tablet, Rfl: 3    Handicap Placard MISC, by Does not apply route Duration 5 years, Disp: 2 each, Rfl: 0    insulin glargine (LANTUS SOLOSTAR) 100 UNIT/ML injection pen, Inject 12 Units into the skin daily, Disp: 5 pen, Rfl: 3    blood glucose test strips (ONETOUCH VERIO) strip, TEST 2 TIMES DAILY AS NEEDED (SYMPTOMS OF LOW BLOOD SUGAR) AS INSTRUCTED, Disp: 100 each, Rfl: 5    Ascorbic Acid (UNRULY-C PO), Take by mouth daily, Disp: , Rfl:     vitamin D (CHOLECALCIFEROL) 25 MCG (1000 UT) TABS tablet, Take 1,000 Units by mouth daily, Disp: , Rfl:     Calcium Ascorbate 500 MG TABS, 500 mg, Disp: , Rfl:     cyclobenzaprine (FLEXERIL) 5 MG tablet, Take 1 tablet by mouth 2 times daily as needed for Muscle spasms, Disp: 20 tablet, Rfl: 3    Blood Glucose Monitoring Suppl (BLOOD GLUCOSE MONITOR SYSTEM) w/Device KIT, Test blood sugar twice daily, and as needed for symptoms of low blood sugar.  (Patient is insulin dependant.), Disp: 1 kit, Rfl: 0    neomycin-polymyxin-hydrocortisone (CORTISPORIN) 3.5-95443-4 otic solution, Place 3 drops into both ears 3 times daily for 10 days Instill into both  Ears, Disp: 1 Bottle, Rfl: 1     Allergies   Allergen Reactions    Hydrocodone-Acetaminophen      Other reaction(s): Unknown    Ibuprofen     Lisinopril      Other reaction(s): Cough    Nsaids Other (See Comments)    Pregabalin Swelling    Simvastatin     Statins Other (See Comments)     Muscle weakness         Past Medical History:   Diagnosis Date    Anemia     Chronic kidney disease     Stage III    Chronic pain     Cirrhosis (HCC)     Depression     Fibromyalgia     Gastroparesis     Pernicious anemia     Thyroid nodule        Health Maintenance Due   Topic Date Due    Annual Wellness Visit (AWV)  12/16/2021        Patient Active Problem List   Diagnosis    Essential hypertension    Slow transit constipation    Type 2 diabetes mellitus (HCC)    Gastroparesis    Chronic right shoulder pain    Fibromyalgia    Thyroid nodule    Pernicious anemia    Hypothyroidism    Abnormal liver enzymes    Bladder prolapse, female, acquired    Urge incontinence    Fatty liver    Depression    Mixed hyperlipidemia    Need for influenza vaccination    Chronic kidney disease    Dysuria    Overactive bladder    Herniated lumbar intervertebral disc    Left lumbar radiculopathy    Gastroesophageal reflux disease    Screening mammogram for high-risk patient    Muscle spasm    Chronic pain of both shoulders    Vitamin B12 deficiency    Cervical radiculitis    Acute non-recurrent pansinusitis    Vitamin D deficiency    Need for shingles vaccine    Neuropathy    Visual problems    Iron deficiency        Past Surgical History:   Procedure Laterality Date    APPENDECTOMY      CHOLECYSTECTOMY      EYE SURGERY      cataract OU    HEMORRHOID SURGERY      HYSTERECTOMY, TOTAL ABDOMINAL      JOINT REPLACEMENT Left     Knee     KNEE ARTHROPLASTY Right     SUBTOTAL COLECTOMY          Family History   Problem Relation Age of Onset    Other Mother         cva    High Blood Pressure Mother     Other Father         heart disease         Social History     Tobacco Use    Smoking status: Never Smoker    Smokeless tobacco: Never Used   Substance Use Topics    Alcohol use: Not Currently    Drug use:  Yes Comment: Norco 5/325        Objective  Vitals:    12/23/21 1458   BP: 136/66   Pulse: 97   Temp: 97.3 °F (36.3 °C)   SpO2: 96%   Weight: 167 lb (75.8 kg)   Height: 5' 1\" (1.549 m)        Exam:  Physical Exam  Vitals reviewed. Constitutional:       General: She is not in acute distress. Appearance: Normal appearance. She is obese. She is not ill-appearing. HENT:      Head: Normocephalic and atraumatic. Right Ear: External ear normal.      Left Ear: External ear normal.   Eyes:      General: No scleral icterus. Right eye: No discharge. Left eye: No discharge. Conjunctiva/sclera: Conjunctivae normal.   Neck:      Comments: Tender to palpation along the cervical spine  Cardiovascular:      Rate and Rhythm: Normal rate and regular rhythm. Pulses: Normal pulses. Heart sounds: Normal heart sounds. No murmur heard. Pulmonary:      Effort: Pulmonary effort is normal. No respiratory distress. Breath sounds: No rales. Abdominal:      General: Bowel sounds are normal. There is no distension. Palpations: Abdomen is soft. There is no mass. Tenderness: There is no abdominal tenderness. Musculoskeletal:         General: Tenderness present. Normal range of motion. Cervical back: Normal range of motion and neck supple. No rigidity. No muscular tenderness. Comments: Tenderness to palpation of both shoulders, tenderness on palpation of her lower lumbar spine. Decreased range of motion to internal and external rotation of both shoulders, slightly decreased flexion of her dorsal spine. Lymphadenopathy:      Cervical: No cervical adenopathy. Skin:     General: Skin is warm and dry. Coloration: Skin is pale. Skin is not jaundiced. Neurological:      Mental Status: She is alert and oriented to person, place, and time. Motor: No weakness. Gait: Gait normal.      Comments: Decreased sensation to light touch from the mid calf down bilaterally. Psychiatric:         Mood and Affect: Mood normal.         Behavior: Behavior normal.         Thought Content: Thought content normal.         Judgment: Judgment normal.          Last labs reviewed. ASSESSMENT & PLAN :   Problem List        Circulatory    Essential hypertension     Blood pressures are stable. Continue medications and monitor blood pressures at home. Call office if systolics are over 198 over diastolics over 90. Relevant Medications    amLODIPine (NORVASC) 10 MG tablet       Digestive    Gastroesophageal reflux disease       continue lansoprazole. Prescription given         Relevant Medications    lansoprazole (PREVACID) 30 MG delayed release capsule       Endocrine    Type 2 diabetes mellitus ([facility] [address]       continue her insulin as before. Monitor blood sugars and watch for low blood sugar spells. Continue glimepiride 1 mg twice daily         Relevant Medications    Blood Glucose Monitoring Suppl (BLOOD GLUCOSE MONITOR SYSTEM) w/Device KIT    insulin glargine (LANTUS SOLOSTAR) 100 UNIT/ML injection pen    blood glucose test strips (ONETOUCH VERIO) strip    glimepiride (AMARYL) 1 MG tablet    PENTIPS 31G X 8 MM MISC    Lancets 28G MISC    Hypothyroidism       TSH and free T4 normal.  Continue present dose of levothyroxine         Relevant Medications    levothyroxine (SYNTHROID) 50 MCG tablet       Nervous and Auditory    Left lumbar radiculopathy       Tylenol and tramadol. OARRS report reviewed. Her use is appropriate. Prescription management         Relevant Medications    cyclobenzaprine (FLEXERIL) 5 MG tablet    Handicap Placard MISC    gabapentin (NEURONTIN) 300 MG capsule    Neuropathy       increase gabapentin to 300 mg twice daily. Prescription written         Relevant Medications    gabapentin (NEURONTIN) 300 MG capsule       Genitourinary    Chronic kidney disease       use as little NSAIDs as possible.   Will monitor            Other    Chronic right shoulder pain refer to Dr. Davey Cano for further evaluation. He gave her a shot last February which helped her for a while         Relevant Medications    cyclobenzaprine (FLEXERIL) 5 MG tablet    gabapentin (NEURONTIN) 300 MG capsule    Mixed hyperlipidemia       watch diet. Continue ezetimibe as it is working well for her         Relevant Medications    amLODIPine (NORVASC) 10 MG tablet    ezetimibe (ZETIA) 10 MG tablet    Need for influenza vaccination       high-dose flu shot given         Relevant Orders    INFLUENZA, QUADV, ADJUVANTED, 65 YRS =, IM, PF, PREFILL SYR, 0.5ML (FLUAD) (Completed)    Chronic pain of both shoulders - Primary       refer to Dr. Davey Cano. Voltaren gel she can apply it up to 4 times a day sparingly. Take care tramadol and Tylenol. I am unable to give her oral NSAIDs because of her CKD and history of GERD         Relevant Medications    cyclobenzaprine (FLEXERIL) 5 MG tablet    diclofenac sodium (VOLTAREN) 1 % GEL    gabapentin (NEURONTIN) 300 MG capsule    Other Relevant Orders    Amb External Referral To Orthopedic Surgery    Iron deficiency       return fasting for serum iron and total iron binding capacity, serum ferritin. If her iron levels are low will refer to hematology as she has a hard time taking oral iron but has had problems with IV iron         Relevant Orders    IRON AND TIBC    FERRITIN           Return in about 1 month (around 1/23/2022), or Med refill.        Bert Miranda, DO  12/23/2021

## 2021-12-23 NOTE — ASSESSMENT & PLAN NOTE
refer to Dr. James Rice for further evaluation.   He gave her a shot last February which helped her for a while

## 2021-12-23 NOTE — ASSESSMENT & PLAN NOTE
refer to Dr. Obed Haile. Voltaren gel she can apply it up to 4 times a day sparingly. Take care tramadol and Tylenol.   I am unable to give her oral NSAIDs because of her CKD and history of GERD

## 2021-12-23 NOTE — ASSESSMENT & PLAN NOTE
Blood pressures are stable. Continue medications and monitor blood pressures at home. Call office if systolics are over 503 over diastolics over 90.

## 2021-12-23 NOTE — ASSESSMENT & PLAN NOTE
return fasting for serum iron and total iron binding capacity, serum ferritin.   If her iron levels are low will refer to hematology as she has a hard time taking oral iron but has had problems with IV iron

## 2021-12-23 NOTE — PATIENT INSTRUCTIONS
Patient Education        Influenza (Flu) Vaccine (Inactivated or Recombinant): What You Need to Know  Why get vaccinated? Influenza vaccine can prevent influenza (flu). Flu is a contagious disease that spreads around the [place] every year, usually between October and May. Anyone can get the flu, but it is more dangerous for some people. Infants and young children, people 72 years and older, pregnant people, and people with certain health conditions or a weakened immune system are at greatest risk of flu complications. Pneumonia, bronchitis, sinus infections, and ear infections are examples of flu-related complications. If you have a medical condition, such as heart disease, cancer, or diabetes, flu can make it worse. Flu can cause fever and chills, sore throat, muscle aches, fatigue, cough, headache, and runny or stuffy nose. Some people may have vomiting and diarrhea, though this is more common in children than adults. Each year, thousands of people in the [facility] die from flu, and many more are hospitalized. Flu vaccine prevents millions of illnesses and flu-related visits to the doctor each year. Influenza vaccines  CDC recommends everyone 6 months and older get vaccinated every flu season. Children 6 months through 6years of age may need 2 doses during a single flu season. Everyone else needs only 1 dose each flu season. It takes about 2 weeks for protection to develop after vaccination. There are many flu viruses, and they are always changing. Each year a new flu vaccine is made to protect against the influenza viruses believed to be likely to cause disease in the upcoming flu season. Even when the vaccine doesn't exactly match these viruses, it may still provide some protection. Influenza vaccine does not cause flu. Influenza vaccine may be given at the same time as other vaccines.   Talk with your health care provider  Tell your vaccination provider if the person getting the vaccine:  · Has had an allergic reaction after a previous dose of influenza vaccine, or has any severe, life-threatening allergies  · Has ever had Guillain-Barré Syndrome (also called \"GBS\")  In some cases, your health care provider may decide to postpone influenza vaccination until a future visit. Influenza vaccine can be administered at any time during pregnancy. People who are or will be pregnant during influenza season should receive inactivated influenza vaccine. People with minor illnesses, such as a cold, may be vaccinated. People who are moderately or severely ill should usually wait until they recover before getting influenza vaccine. Your health care provider can give you more information. Risks of a vaccine reaction  · Soreness, redness, and swelling where the shot is given, fever, muscle aches, and headache can happen after influenza vaccination. · There may be a very small increased risk of Guillain-Barré Syndrome (GBS) after inactivated influenza vaccine (the flu shot). 8 [facility] children who get the flu shot along with pneumococcal vaccine (PCV13) and/or DTaP vaccine at the same time might be slightly more likely to have a seizure caused by fever. Tell your health care provider if a child who is getting flu vaccine has ever had a seizure. People sometimes faint after medical procedures, including vaccination. Tell your provider if you feel dizzy or have vision changes or ringing in the ears. As with any medicine, there is a very remote chance of a vaccine causing a severe allergic reaction, other serious injury, or death. What if there is a serious problem? An allergic reaction could occur after the vaccinated person leaves the clinic.  If you see signs of a severe allergic reaction (hives, swelling of the face and throat, difficulty breathing, a fast heartbeat, dizziness, or weakness), call 9-1-1 and get the person to the nearest hospital.  For other signs that concern you, call your health care provider. Adverse reactions should be reported to the Vaccine Adverse Event Reporting System (VAERS). Your health care provider will usually file this report, or you can do it yourself. Visit the VAERS website at www.vaers. [facility].gov or call 5-530.658.7462. VAERS is only for reporting reactions, and VAERS staff members do not give medical advice. The National Vaccine Injury Compensation Program  The National Vaccine Injury Compensation Program (VICP) is a federal program that was created to compensate people who may have been injured by certain vaccines. Claims regarding alleged injury or death due to vaccination have a time limit for filing, which may be as short as two years. Visit the VICP website at www.[facility].gov/vaccinecompensation or call 5-157.534.4607 to learn about the program and about filing a claim. How can I learn more? · Ask your health care provider. · Call your local or state health department. · Visit the website of the Food and Drug Administration (FDA) for vaccine package inserts and additional information at www.fda.gov/vaccines-blood-biologics/vaccines. · Contact the Centers for Disease Control and Prevention (CDC):  ? Call 2-696.118.4374 (1-800-CDC-INFO) or  ? Visit CDC's website at www.cdc.gov/flu. Vaccine Information Statement  Inactivated Influenza Vaccine  8/6/2021  [id number]  [facility] and [facility] for Disease Control and Prevention  Many vaccine information statements are available in Telugu and other languages. See www.immunize.org/vis. Hojas de información sobre vacunas están disponibles en español y en muchos otros idiomas. Visite www.immunize.org/vis. Care instructions adapted under license by [facility] ([facility]). If you have questions about a medical condition or this instruction, always ask your healthcare professional. [address] any warranty or liability for your use of this information.

## 2022-01-20 ENCOUNTER — TELEPHONE (OUTPATIENT)
Dept: PRIMARY CARE CLINIC | Age: 87
End: 2022-01-20

## 2022-01-20 DIAGNOSIS — R19.7 DIARRHEA OF PRESUMED INFECTIOUS ORIGIN: Primary | ICD-10-CM

## 2022-01-20 NOTE — TELEPHONE ENCOUNTER
Spoke with Vicente Mayberry and she stated that Nina Nash will not go to ED. She does not want to expose herself to covid and she wanted to know if she can just do a sample here. She has diarrhea and will not leave her house. Vicente Mayberry will come get the specimen container and will bring it back.

## 2022-01-20 NOTE — TELEPHONE ENCOUNTER
Have her go to the ED and get this checked out. They can get the tests done a lot sooner than we can here in the office.

## 2022-01-20 NOTE — TELEPHONE ENCOUNTER
Okay.  If she will go to the ED then she can have her stool test done here. Daughter has to come and  the stool containers and I will put in the orders.   This has to be a fresh stool sample for it to be tested for C. difficile

## 2022-01-20 NOTE — TELEPHONE ENCOUNTER
Pt daughter called in asking to speak with Sonia Roy about her mom having bad diarrhea and its a lot of green and she cant get her to go to ER because she is afraid of Covid. Wanted to know if she can get something to test or something to help with gastric problems. Requesting call back from Sonia Roy.   They are worried about CDiff

## 2022-01-22 PROBLEM — Z23 NEED FOR INFLUENZA VACCINATION: Status: RESOLVED | Noted: 2019-10-15 | Resolved: 2022-01-22

## 2022-01-31 ENCOUNTER — OFFICE VISIT (OUTPATIENT)
Dept: PRIMARY CARE CLINIC | Age: 87
End: 2022-01-31
Payer: MEDICARE

## 2022-01-31 VITALS
WEIGHT: 165 LBS | TEMPERATURE: 97.5 F | OXYGEN SATURATION: 96 % | HEART RATE: 116 BPM | DIASTOLIC BLOOD PRESSURE: 78 MMHG | HEIGHT: 61 IN | BODY MASS INDEX: 31.15 KG/M2 | SYSTOLIC BLOOD PRESSURE: 134 MMHG

## 2022-01-31 DIAGNOSIS — Z12.31 ENCOUNTER FOR SCREENING MAMMOGRAM FOR BREAST CANCER: ICD-10-CM

## 2022-01-31 DIAGNOSIS — M25.512 CHRONIC PAIN OF BOTH SHOULDERS: ICD-10-CM

## 2022-01-31 DIAGNOSIS — M25.511 CHRONIC PAIN OF BOTH SHOULDERS: ICD-10-CM

## 2022-01-31 DIAGNOSIS — N18.32 STAGE 3B CHRONIC KIDNEY DISEASE (HCC): ICD-10-CM

## 2022-01-31 DIAGNOSIS — E03.9 ACQUIRED HYPOTHYROIDISM: ICD-10-CM

## 2022-01-31 DIAGNOSIS — D72.828 OTHER ELEVATED WHITE BLOOD CELL (WBC) COUNT: ICD-10-CM

## 2022-01-31 DIAGNOSIS — E11.65 TYPE 2 DIABETES MELLITUS WITH HYPERGLYCEMIA, WITH LONG-TERM CURRENT USE OF INSULIN (HCC): ICD-10-CM

## 2022-01-31 DIAGNOSIS — E53.8 VITAMIN B12 DEFICIENCY: ICD-10-CM

## 2022-01-31 DIAGNOSIS — Z79.4 TYPE 2 DIABETES MELLITUS WITH HYPERGLYCEMIA, WITH LONG-TERM CURRENT USE OF INSULIN (HCC): ICD-10-CM

## 2022-01-31 DIAGNOSIS — G89.29 CHRONIC PAIN OF BOTH SHOULDERS: ICD-10-CM

## 2022-01-31 DIAGNOSIS — I10 ESSENTIAL HYPERTENSION: Primary | ICD-10-CM

## 2022-01-31 DIAGNOSIS — E78.2 MIXED HYPERLIPIDEMIA: ICD-10-CM

## 2022-01-31 PROBLEM — M19.91 PRIMARY OSTEOARTHRITIS: Status: ACTIVE | Noted: 2022-01-31

## 2022-01-31 PROCEDURE — 99213 OFFICE O/P EST LOW 20 MIN: CPT | Performed by: INTERNAL MEDICINE

## 2022-01-31 RX ORDER — CONJUGATED ESTROGENS 0.62 MG/G
CREAM VAGINAL DAILY
COMMUNITY
End: 2022-09-20 | Stop reason: SDUPTHER

## 2022-01-31 RX ORDER — TRAMADOL HYDROCHLORIDE 50 MG/1
50 TABLET ORAL EVERY 6 HOURS PRN
Qty: 120 TABLET | Refills: 0 | Status: SHIPPED | OUTPATIENT
Start: 2022-01-31 | End: 2022-03-02

## 2022-01-31 ASSESSMENT — ENCOUNTER SYMPTOMS
COUGH: 0
VOMITING: 0
CONSTIPATION: 0
EYE ITCHING: 0
SHORTNESS OF BREATH: 0
SINUS PAIN: 0
ABDOMINAL DISTENTION: 0
SINUS PRESSURE: 0
EYE PAIN: 0
BACK PAIN: 1
WHEEZING: 0
ABDOMINAL PAIN: 1
EYE DISCHARGE: 0
NAUSEA: 0
EYE REDNESS: 0
APNEA: 0
CHEST TIGHTNESS: 0

## 2022-01-31 ASSESSMENT — PATIENT HEALTH QUESTIONNAIRE - PHQ9
9. THOUGHTS THAT YOU WOULD BE BETTER OFF DEAD, OR OF HURTING YOURSELF: 0
8. MOVING OR SPEAKING SO SLOWLY THAT OTHER PEOPLE COULD HAVE NOTICED. OR THE OPPOSITE, BEING SO FIGETY OR RESTLESS THAT YOU HAVE BEEN MOVING AROUND A LOT MORE THAN USUAL: 0
1. LITTLE INTEREST OR PLEASURE IN DOING THINGS: 0
SUM OF ALL RESPONSES TO PHQ QUESTIONS 1-9: 0
6. FEELING BAD ABOUT YOURSELF - OR THAT YOU ARE A FAILURE OR HAVE LET YOURSELF OR YOUR FAMILY DOWN: 0
SUM OF ALL RESPONSES TO PHQ QUESTIONS 1-9: 0
SUM OF ALL RESPONSES TO PHQ QUESTIONS 1-9: 0
3. TROUBLE FALLING OR STAYING ASLEEP: 0
4. FEELING TIRED OR HAVING LITTLE ENERGY: 0
2. FEELING DOWN, DEPRESSED OR HOPELESS: 0
10. IF YOU CHECKED OFF ANY PROBLEMS, HOW DIFFICULT HAVE THESE PROBLEMS MADE IT FOR YOU TO DO YOUR WORK, TAKE CARE OF THINGS AT HOME, OR GET ALONG WITH OTHER PEOPLE: 0
SUM OF ALL RESPONSES TO PHQ9 QUESTIONS 1 & 2: 0
5. POOR APPETITE OR OVEREATING: 0
7. TROUBLE CONCENTRATING ON THINGS, SUCH AS READING THE NEWSPAPER OR WATCHING TELEVISION: 0
SUM OF ALL RESPONSES TO PHQ QUESTIONS 1-9: 0

## 2022-01-31 NOTE — ASSESSMENT & PLAN NOTE
I think her leukocytosis was secondary to her being sick that day.   We will recheck her WBC and if still elevated will refer to hematology

## 2022-01-31 NOTE — PROGRESS NOTES
2022    Name: Yonis Steven : 1935 Sex: female  Age: 81 y.o. Subjective:  Chief Complaint   Patient presents with    Medication Refill        Hypertension  Pertinent negatives include no chest pain, neck pain, palpitations or shortness of breath. .    She has a history of type 2 diabetes mellitus and takes her glimepiride most of the time but sometimes if her blood sugar is low she will not take it. She is also on Lantus 12 units daily. Blood sugars have been anywhere from 100-200 but when she gets sick it went higher than that. Hemoglobin A1c was 6.5%. Meryle Sandman History of hypothyroidism on levothyroxine 50 mcg a day. Her TSH was low so we cut back her levothyroxine 50 mcg to 5 days a week. We will recheck next office visit    History of gastritis and GERD on Prevacid 30 mg a day    History of diabetic neuropathy on gabapentin 300 mg a day. She still has problems and would like to increase it to 300 mg twice daily. We are going to send in a new prescription. History of hyperlipidemia on ezetimibe 10 mg a day  Total cholesterol 270 LDL cholesterol 127, HDL cholesterol 59 and triglycerides 154. She is intolerant to statins. History of hypertension on amlodipine 10 mg a day. Blood pressures are acceptable at home. She has a history of chronic kidney disease and her last creatinine was 1.3, BUN 50  Estimated GFR was 39. She had a history of iron deficiency anemia at one time. Her hemoglobin is not bad at 12.2 with a hematocrit of 39.5 but her indices are microcytic and hypochromic. . Iron studies were normal    Her white blood cell count in 2022 was 21,600 however the day after her blood work was done she came down with a severe viral gastroenteritis. Her checked a home Covid test on her which was negative but the patient had severe diarrhea and abdominal pain for almost a week. This has since subsided she was unable to get her stool studies done.   We will recheck her white blood cell count. History of severe back pain with radiculitis, history of herniated lumbar disc and radiculopathy. She is on gabapentin which we increased to 300 mg a day and Norco 5/325 1 a day as needed. OARRS report reviewed and the last time she was given tramadol was on November 17, 2021. She received tramadol 50 mg 120 pills. Chronic pain checkpoints reviewed. X-rays of her cervical spine and a CT scan of her lumbar spine  Were done last year. 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 She has degenerative disc disease and spinal stenosis of her lumbar spine with a herniated disc L4/L5 impressing on the left L5 nerve root which is where she has the most pain on the left side . We talked about referral to physiatry for possible epidurals. She said she had them in the past and they worked for a long time. She is not made her mind up which one she wants to go to. I gave her the names of Dr. Molina Taylor and Dr. Nikole Jackson but patient wishes to discuss this with her family and then let me know which one she will go to. She saw Dr. Lisa Antony earlier this year and he x-rayed her shoulder. Reviewed report. He also gave her a shot which did help somewhat. She is going back to see him. She said increased pain in her right shoulder now is having pain in her left shoulder. He ended up giving her steroid shots in both shoulders. She complains of numbness and burning of her feet. She also has leg pain worse on the left. I wonder if she has underlying diabetic neuropathy as well as her left lumbar radiculopathy. I discussed obtaining EMG studies on her but patient refuses to do this she says she will think about it    History of urinary urgency and frequency. Saw urologist who said nothing could be done. Then saw surgeon in [place] Dr. Marcus Sorto who did surgery on her but unfortunately this did not work very well. History of gastroparesis. Saw initially .  GI  in [place] and was referred to Dr. Yony Friedman in [facility] who said that she was too old for surgery. She would like to see Dr. Renay Daley who did her cataract surgery. She is having trouble seeing numbers. She has had her Covid vaccine along with her booster shot. She had her flu shot    Review of Systems   Constitutional: Positive for fatigue. Negative for fever. HENT: Negative for congestion, ear pain, postnasal drip, sinus pressure and sinus pain. Eyes: Positive for visual disturbance. Negative for pain, discharge, redness and itching. Has trouble reading numbers   Respiratory: Negative for apnea, cough, chest tightness, shortness of breath and wheezing. Cardiovascular: Negative for chest pain, palpitations and leg swelling. Gastrointestinal: Positive for abdominal pain. Negative for abdominal distention, constipation, nausea and vomiting. Endocrine: Negative for cold intolerance, heat intolerance and polyuria. Genitourinary: Positive for frequency and urgency. Negative for difficulty urinating, dysuria, flank pain and pelvic pain. Musculoskeletal: Positive for arthralgias and back pain. Negative for gait problem, joint swelling, myalgias, neck pain and neck stiffness. Neurological: Negative for dizziness, tremors, seizures and syncope. Psychiatric/Behavioral: Negative for agitation, confusion and sleep disturbance. Current Outpatient Medications:     conjugated estrogens (PREMARIN) 0.625 MG/GM vaginal cream, Place vaginally daily, Disp: , Rfl:     diclofenac sodium (VOLTAREN) 1 % GEL, Apply 2 g topically 4 times daily, Disp: 150 g, Rfl: 3    traMADol (ULTRAM) 50 MG tablet, Take 1 tablet by mouth every 6 hours as needed for Pain for up to 30 days. , Disp: 120 tablet, Rfl: 0    lansoprazole (PREVACID) 30 MG delayed release capsule, Take 1 capsule by mouth daily, Disp: 90 capsule, Rfl: 3    levothyroxine (SYNTHROID) 50 MCG tablet, Take 1 tablet by mouth Daily Monday to Friday (not Saturday or Sunday), Disp: 90 tablet, Rfl: 3    gabapentin (NEURONTIN) 300 MG capsule, Take 1 capsule by mouth 2 times daily for 90 days. , Disp: 180 capsule, Rfl: 1    PENTIPS 31G X 8 MM MISC, USE AS DIRECTED DAILY, Disp: 100 each, Rfl: 2    Lancets 28G MISC, Test blood sugar twice daily and as needed for symptoms of low blood sugar., Disp: 100 each, Rfl: 5    CALCIUM-MAGNESIUM-ZINC PO, Take by mouth, Disp: , Rfl:     amLODIPine (NORVASC) 10 MG tablet, Take 1 tablet by mouth daily, Disp: 90 tablet, Rfl: 3    ezetimibe (ZETIA) 10 MG tablet, Take 1 tablet by mouth daily, Disp: 90 tablet, Rfl: 3    glimepiride (AMARYL) 1 MG tablet, Take 1 tablet by mouth 2 times daily, Disp: 180 tablet, Rfl: 3    Handicap Placard MISC, by Does not apply route Duration 5 years, Disp: 2 each, Rfl: 0    insulin glargine (LANTUS SOLOSTAR) 100 UNIT/ML injection pen, Inject 12 Units into the skin daily, Disp: 5 pen, Rfl: 3    blood glucose test strips (ONETOUCH VERIO) strip, TEST 2 TIMES DAILY AS NEEDED (SYMPTOMS OF LOW BLOOD SUGAR) AS INSTRUCTED, Disp: 100 each, Rfl: 5    neomycin-polymyxin-hydrocortisone (CORTISPORIN) 3.5-21207-8 otic solution, Place 3 drops into both ears 3 times daily for 10 days Instill into both  Ears, Disp: 1 Bottle, Rfl: 1    Ascorbic Acid (UNRULY-C PO), Take by mouth daily, Disp: , Rfl:     vitamin D (CHOLECALCIFEROL) 25 MCG (1000 UT) TABS tablet, Take 1,000 Units by mouth daily, Disp: , Rfl:     Calcium Ascorbate 500 MG TABS, 500 mg, Disp: , Rfl:     Blood Glucose Monitoring Suppl (BLOOD GLUCOSE MONITOR SYSTEM) w/Device KIT, Test blood sugar twice daily, and as needed for symptoms of low blood sugar.  (Patient is insulin dependant.), Disp: 1 kit, Rfl: 0     Allergies   Allergen Reactions    Hydrocodone-Acetaminophen      Other reaction(s): Unknown    Ibuprofen     Lisinopril      Other reaction(s): Cough    Nsaids Other (See Comments)    Pregabalin Swelling    Simvastatin     Statins Other (See Comments)     Muscle weakness         Past Medical History:   Diagnosis Date    Anemia     Chronic kidney disease     Stage III    Chronic pain     Cirrhosis (HCC)     Depression     Fibromyalgia     Gastroparesis     Pernicious anemia     Thyroid nodule        Health Maintenance Due   Topic Date Due    Depression Monitoring  Never done    Annual Wellness Visit (AWV)  12/16/2021        Patient Active Problem List   Diagnosis    Essential hypertension    Slow transit constipation    Type 2 diabetes mellitus (HCC)    Gastroparesis    Other chronic pain    Fibromyalgia    Thyroid nodule    Pernicious anemia    Hypothyroidism    Abnormal liver enzymes    Bladder prolapse, female, acquired    Urge incontinence    Fatty liver    Depression    Mixed hyperlipidemia    Chronic kidney disease    Dysuria    Overactive bladder    Herniated lumbar intervertebral disc    Left lumbar radiculopathy    Gastroesophageal reflux disease    Encounter for screening mammogram for breast cancer    Muscle spasm    Chronic pain of both shoulders    Vitamin B12 deficiency    Cervical radiculitis    Acute non-recurrent pansinusitis    Vitamin D deficiency    Need for shingles vaccine    Neuropathy    Visual problems    Iron deficiency    Diarrhea of presumed infectious origin    Primary osteoarthritis    Leucocytosis        Past Surgical History:   Procedure Laterality Date    APPENDECTOMY      CHOLECYSTECTOMY      EYE SURGERY      cataract OU    HEMORRHOID SURGERY      HYSTERECTOMY, TOTAL ABDOMINAL      JOINT REPLACEMENT Left     Knee     KNEE ARTHROPLASTY Right     SUBTOTAL COLECTOMY          Family History   Problem Relation Age of Onset    Other Mother         cva    High Blood Pressure Mother     Other Father         heart disease         Social History     Tobacco Use    Smoking status: Never Smoker    Smokeless tobacco: Never Used   Substance Use Topics    Alcohol use: Not Currently    Drug use: Yes     Comment: Norco 5/325        Objective  Vitals:    01/31/22 1511   BP: 134/78   Pulse: 116   Temp: 97.5 °F (36.4 °C)   SpO2: 96%   Weight: 165 lb (74.8 kg)   Height: 5' 1\" (1.549 m)        Exam:  Physical Exam  Vitals reviewed. Constitutional:       General: She is not in acute distress. Appearance: Normal appearance. She is obese. She is not ill-appearing. HENT:      Head: Normocephalic and atraumatic. Right Ear: External ear normal.      Left Ear: External ear normal.   Eyes:      General: No scleral icterus. Right eye: No discharge. Left eye: No discharge. Conjunctiva/sclera: Conjunctivae normal.   Neck:      Comments: Tender to palpation along the cervical spine  Cardiovascular:      Rate and Rhythm: Normal rate and regular rhythm. Pulses: Normal pulses. Heart sounds: Normal heart sounds. No murmur heard. Pulmonary:      Effort: Pulmonary effort is normal. No respiratory distress. Breath sounds: No rales. Abdominal:      General: Bowel sounds are normal. There is no distension. Palpations: Abdomen is soft. There is no mass. Tenderness: There is no abdominal tenderness. Musculoskeletal:         General: Tenderness present. Normal range of motion. Cervical back: Normal range of motion and neck supple. No rigidity. No muscular tenderness. Comments: Tenderness to palpation of both shoulders, tenderness on palpation of her lower lumbar spine. Decreased range of motion to internal and external rotation of both shoulders, slightly decreased flexion of her dorsal spine. Lymphadenopathy:      Cervical: No cervical adenopathy. Skin:     General: Skin is warm and dry. Coloration: Skin is pale. Skin is not jaundiced. Neurological:      Mental Status: She is alert and oriented to person, place, and time. Motor: No weakness.       Gait: Gait normal.      Comments: Decreased sensation to light touch from the mid calf down bilaterally. Psychiatric:         Mood and Affect: Mood normal.         Behavior: Behavior normal.         Thought Content: Thought content normal.         Judgment: Judgment normal.          Last labs reviewed. ASSESSMENT & PLAN :   Problem List        Circulatory    Essential hypertension - Primary     Blood pressures are stable. Continue medications and monitor blood pressures at home. Call office if systolics are over 511 over diastolics over 90. Relevant Medications    amLODIPine (NORVASC) 10 MG tablet       Endocrine    Type 2 diabetes mellitus (HCC)       on insulin. A1c's are good. No hypoglycemic episodes. She does not take her glimepiride every day. Only when her blood sugars are over 100 fasting. Relevant Medications    Blood Glucose Monitoring Suppl (BLOOD GLUCOSE MONITOR SYSTEM) w/Device KIT    insulin glargine (LANTUS SOLOSTAR) 100 UNIT/ML injection pen    blood glucose test strips (ONETOUCH VERIO) strip    glimepiride (AMARYL) 1 MG tablet    PENTIPS 31G X 8 MM MISC    Lancets 28G MISC    Hypothyroidism       continue thyroxine 50 mcg 5 days a week and recheck TSH and free T4 next office visit         Relevant Medications    levothyroxine (SYNTHROID) 50 MCG tablet       Genitourinary    Chronic kidney disease       she uses Voltaren gel but she does not use NSAID's. We will continue to monitor her kidney functions.             Other    Mixed hyperlipidemia       continue ezetimibe and diet         Relevant Medications    amLODIPine (NORVASC) 10 MG tablet    ezetimibe (ZETIA) 10 MG tablet    Encounter for screening mammogram for breast cancer       screening mammogram requisition given         Relevant Orders    [facility] DIGITAL SCREEN W OR WO CAD BILATERAL    Chronic pain of both shoulders       continue tramadol plus Tylenol and follow-up with orthopod         Relevant Medications    gabapentin (NEURONTIN) 300 MG capsule    diclofenac sodium (VOLTAREN) 1 % GEL traMADol (ULTRAM) 50 MG tablet    Vitamin B12 deficiency       check vitamin B12 level next office visit         Leucocytosis       I think her leukocytosis was secondary to her being sick that day. We will recheck her WBC and if still elevated will refer to hematology         Relevant Orders    CBC Auto Differential           Return in about 3 months (around 4/30/2022), or DM, HL and AWV.        Seema Aponte,   1/31/2022

## 2022-01-31 NOTE — ASSESSMENT & PLAN NOTE
on insulin. A1c's are good. No hypoglycemic episodes. She does not take her glimepiride every day. Only when her blood sugars are over 100 fasting.

## 2022-01-31 NOTE — ASSESSMENT & PLAN NOTE
Blood pressures are stable. Continue medications and monitor blood pressures at home. Call office if systolics are over 259 over diastolics over 90.

## 2022-01-31 NOTE — ASSESSMENT & PLAN NOTE
she uses Voltaren gel but she does not use NSAID's. We will continue to monitor her kidney functions.

## 2022-04-21 ENCOUNTER — TELEPHONE (OUTPATIENT)
Dept: PRIMARY CARE CLINIC | Age: 87
End: 2022-04-21

## 2022-05-18 ENCOUNTER — CARE COORDINATION (OUTPATIENT)
Dept: CARE COORDINATION | Age: 87
End: 2022-05-18

## 2022-05-18 NOTE — LETTER
5/18/2022    Mark Sutton  [address] [place] [address]      Dear Mark Sutton    My name is Louis Vo, RN and I am an Ambulatory Care Manager who partners with Dr Cayden Workman to improve patients' health. I've been trying to reach you via phone to let you know that, as a member of your care team, I will work with other providers involved in your care, offer education for your specific health conditions, and connect you with more resources as needed. This program is designed to provide you with the opportunity to have a [facility] partner with you for the following situations:     1) if you come home from the hospital or emergency room   2) to help manage your disease   3) when you would like assistance coordinating services or appointments    This added support is provided at no additional cost to you. My primary focus is to help you achieve specific goals and improve your health. Please call me at [phone] to further discuss how I can support your health care needs.     Sincerely,    KALEB CardenasN, RN  Ambulatory Care Manager

## 2022-05-18 NOTE — CARE COORDINATION
Attempted to contact pt to discuss care coordination and offer enrollment, no answer. Left a VM introducing myself and asking for a call back, contact information given. Will have introduction letter mailed to pt, will try to reach pt another time.

## 2022-05-19 SDOH — HEALTH STABILITY: PHYSICAL HEALTH: ON AVERAGE, HOW MANY DAYS PER WEEK DO YOU ENGAGE IN MODERATE TO STRENUOUS EXERCISE (LIKE A BRISK WALK)?: 0 DAYS

## 2022-05-19 SDOH — ECONOMIC STABILITY: TRANSPORTATION INSECURITY
IN THE PAST 12 MONTHS, HAS THE LACK OF TRANSPORTATION KEPT YOU FROM MEDICAL APPOINTMENTS OR FROM GETTING MEDICATIONS?: NO

## 2022-05-19 SDOH — HEALTH STABILITY: PHYSICAL HEALTH: ON AVERAGE, HOW MANY MINUTES DO YOU ENGAGE IN EXERCISE AT THIS LEVEL?: 0 MIN

## 2022-05-19 SDOH — ECONOMIC STABILITY: FOOD INSECURITY: WITHIN THE PAST 12 MONTHS, THE FOOD YOU BOUGHT JUST DIDN'T LAST AND YOU DIDN'T HAVE MONEY TO GET MORE.: NEVER TRUE

## 2022-05-19 SDOH — ECONOMIC STABILITY: TRANSPORTATION INSECURITY
IN THE PAST 12 MONTHS, HAS LACK OF TRANSPORTATION KEPT YOU FROM MEETINGS, WORK, OR FROM GETTING THINGS NEEDED FOR DAILY LIVING?: NO

## 2022-05-19 SDOH — ECONOMIC STABILITY: FOOD INSECURITY: WITHIN THE PAST 12 MONTHS, YOU WORRIED THAT YOUR FOOD WOULD RUN OUT BEFORE YOU GOT MONEY TO BUY MORE.: SOMETIMES TRUE

## 2022-05-19 ASSESSMENT — SOCIAL DETERMINANTS OF HEALTH (SDOH)
HOW HARD IS IT FOR YOU TO PAY FOR THE VERY BASICS LIKE FOOD, HOUSING, MEDICAL CARE, AND HEATING?: VERY HARD
DO YOU BELONG TO ANY CLUBS OR ORGANIZATIONS SUCH AS CHURCH GROUPS UNIONS, FRATERNAL OR ATHLETIC GROUPS, OR SCHOOL GROUPS?: NO
HOW OFTEN DO YOU ATTEND CHURCH OR RELIGIOUS SERVICES?: MORE THAN 4 TIMES PER YEAR
HOW OFTEN DO YOU ATTENT MEETINGS OF THE CLUB OR ORGANIZATION YOU BELONG TO?: NEVER
HOW OFTEN DO YOU GET TOGETHER WITH FRIENDS OR RELATIVES?: NEVER
IN A TYPICAL WEEK, HOW MANY TIMES DO YOU TALK ON THE PHONE WITH FAMILY, FRIENDS, OR NEIGHBORS?: MORE THAN THREE TIMES A WEEK

## 2022-05-19 ASSESSMENT — LIFESTYLE VARIABLES
HOW OFTEN DO YOU HAVE A DRINK CONTAINING ALCOHOL: NEVER
HOW MANY STANDARD DRINKS CONTAINING ALCOHOL DO YOU HAVE ON A TYPICAL DAY: 1 OR 2

## 2022-05-19 NOTE — CARE COORDINATION
Ambulatory Care Coordination Note  5/19/2022  CM Risk Score: 1  Charlson 10 Year Mortality Risk Score: 100%     ACC: James Sauceda, RN    Summary Note:   *Enrollment  -Contacted pt to discuss care coordination and offer enrollment. Introduced myself, explained role, and explained care coordination. Pt agreeable to enrollment  -See CC protocol for enrollment details  -DME: shower chair, bedside commode (not using currently), straight cane, rollator,   -Able to bathe, groom, and dress herself per pt  -Reports that she has gait instability and worries about falling, ambulates with her straight cane and has a rollator. Denies any recent falls  -Needs assistance with meal preparation-was receiving home delivered meals in the past but cancelled them d/t not being able to eat them, pt didn't care for the taste. Not interested in home delivered meals at this time  -Reports that she is unable to do a lot of cleaning in her home d/t her rotator cuffs being compromised and is unable to lift heavy objects  -Pt reports that her laundry room is in the basement, reports that she washes her own clothes but may need assistance at times  -Pt reports that she still drives and was driving herself to her OV appts when she felt comfortable doing so, but pt's daughter who recently retired transports pt to appt and to get things needed for daily living at this time     *Medications  -Reviewed with pt who reports taking them as prescribed  -Reports that she was receiving assistance from \"[employer]\" and was to update some information but is unsure what to do and asking for assistance, will place SW referral for possible assistance, pt agreeable    *SDOH  -Financial resource strain: pt reports \"very hard:\" reports that she has tried to get on Medicaid in the past and was told she didn't qualify. Reports that she has a lot of out of pocket expenses that cause a strain.  Reports that she was receiving $20 in SNAP benefits prior to the pandemic Tex continues to spend his time in his room-visibly anxious when approached or when out of room-trembling with anxiety-has remained polite in interactions, but guarded and appears hurried to end interaction-discussed with evening shift plan to offer Ativan PRN prior to evening baseball and then to encourage him to come to lounge to watch game-Tex visited with mom this afternoon-visit appeared to go well and he thanked mom for visiting   and is now receiving around $100/month in SNAP benefits. Reports that she is set up with HEAP and has a \"basic\" water bill. Reports that she opted out of PIPP for her electric but that she is unable to run her air conditioning unit d/t the increase it would cause in her electric bill. Receives $70/quarterly from her insurance OTC benefit. Reports that she has to purchase incontinence supplies such as off brand depends d/t her being allergic to the Depends brand which aren't covered on her OTC benefit card. Reports that she also purchases pads as well  -ACM discussed a  referral for financial resource stain, pt agreeable  -Stress: pt reports \"to some extent:\" Reports that she gets depressed d/t not being able to do a lot outside of her home d/t her incontinence issues and her rotator cuff issues. Reports that she also gets stressed d/t her financial resource strain. ACM processed if pt would like to talk with a therapist to process her feelings, pt declined and reports that in the past she was started on medications and she didn't like the way they made her feel, ACM discussed that pt may not necessarily need medications and could just talk with a therapist to help her process her thoughts, pt declined at this time and reports that her daughter who is a nurse in [place] is a good listener and she talks to her  -Food insecurity: reports that she worries that her food will run out before she has money to buy more. She was receiving $20 in SNAP benefits prior to the pandemic and is now receiving around $100/month.  Reports that it still causes a strain as she has a lot of out of pocket expenses    *DM  -A1C as of 1/14/22 is 6.5  -Checks a FBS daily and if she feels her BS is low she checks it a second time a day  -Hasn't checked FBS today yet  -Yesterday's BS was 80  -Reports that she suffers from diabetic neuropathy  -Discussed a RD CC referral with pt for diabetic diet education, undecided at  this time, will revisit at next outreach    *Advance Directives  -Inquired about if pt has advance directives, pt reports that she has a healthcare power of  but not a living will. ACM asked pt to take a copy to have it scanned in her chart at her next OV appt, verbalized understanding  -Will confirm healthcare decision maker information at next oureach    *Appts  -PCP appt [email or web address]  -Pt plans to attend appt    *Plan  -Care coordination  -SW referral for financial resource strain, food insecurity, [employer] assistance, and eval for any other social needs and possible resources for pt  -ACM will have DM zone tool, BS log, BS/A1C targets handout, s/sx hypo/hyperglycemia handout, where to go according to symptoms handout, walk-in care sites handout mailed to pt  -ACM will f/u in about 1 week to check progress, assess needs, complete PCAM, create goals, inquire about a clinical pharmacy referral, revisit RD CC referral, confirm healthcare decision maker information, pt to contact ACM if any needs arise, given contact information    Ambulatory Care Coordination Assessment    Care Coordination Protocol  Referral from Primary Care Provider: No  Week 1 - Initial Assessment                             Suggested Interventions and Community Resources                  Prior to Admission medications    Medication Sig Start Date End Date Taking? Authorizing Provider   conjugated estrogens (PREMARIN) 0.625 MG/GM vaginal cream Place vaginally daily    Historical Provider, MD   diclofenac sodium (VOLTAREN) 1 % GEL Apply 2 g topically 4 times daily 1/31/22   Queta Mckoy, DO   lansoprazole (PREVACID) 30 MG delayed release capsule Take 1 capsule by mouth daily 12/23/21   Queta Mckoy DO   levothyroxine (SYNTHROID) 50 MCG tablet Take 1 tablet by mouth Daily Monday to Friday (not Saturday or Sunday) 12/23/21   Queta Mckoy DO   gabapentin (NEURONTIN) 300 MG capsule Take 1 capsule by mouth 2 times daily for 90 days.  12/23/21 3/23/22  Queta DO Leelee   PENTIPS 31G X 8 MM MISC USE AS DIRECTED DAILY 11/29/21   Queta Mckoy DO   Lancets 28G MISC Test blood sugar twice daily and as needed for symptoms of low blood sugar. 11/29/21   Queta Mckoy DO   CALCIUM-MAGNESIUM-ZINC PO Take by mouth    Historical Provider, MD   amLODIPine (NORVASC) 10 MG tablet Take 1 tablet by mouth daily 8/31/21   Queta Mckoy DO   ezetimibe (ZETIA) 10 MG tablet Take 1 tablet by mouth daily 8/31/21   Queta Mckoy, DO   glimepiride (AMARYL) 1 MG tablet Take 1 tablet by mouth 2 times daily 8/31/21   Queta Mckoy, DO   Handicap Placard MISC by Does not apply route Duration 5 years 5/13/21   Queta Mckoy DO   insulin glargine (LANTUS SOLOSTAR) 100 UNIT/ML injection pen Inject 12 Units into the skin daily 4/28/21   Queta Mckoy DO   blood glucose test strips (ONETOUCH VERIO) strip TEST 2 TIMES DAILY AS NEEDED (SYMPTOMS OF LOW BLOOD SUGAR) AS INSTRUCTED 4/28/21   Queta Mcoky DO   neomycin-polymyxin-hydrocortisone (CORTISPORIN) 3.5-61439-9 otic solution Place 3 drops into both ears 3 times daily for 10 days Instill into both  Ears 4/28/21 1/31/22  Queta Mckoy DO   Ascorbic Acid (UNRULY-C PO) Take by mouth daily    Historical Provider, MD   vitamin D (CHOLECALCIFEROL) 25 MCG (1000 UT) TABS tablet Take 1,000 Units by mouth daily    Historical Provider, MD   Calcium Ascorbate 500 MG TABS 500 mg 8/18/20   Historical Provider, MD   Blood Glucose Monitoring Suppl (BLOOD GLUCOSE MONITOR SYSTEM) w/Device KIT Test blood sugar twice daily, and as needed for symptoms of low blood sugar.   (Patient is insulin dependant.) 12/19/19   Caleb Calhoun DO       Future Appointments   Date Time Provider Gerry Osorio   5/24/2022  2[facility]   5/24/2022  3:00 PM Queta Mckoy DO [facility]     ,   Diabetes Assessment            and   General Assessment

## 2022-05-20 ENCOUNTER — CARE COORDINATION (OUTPATIENT)
Dept: CARE COORDINATION | Age: 87
End: 2022-05-20

## 2022-05-20 NOTE — CARE COORDINATION
[facility] made call to pt to initiate SW referal, unable to reach, left message requesting call back to this [facility] with contact info provided.     Urbano ROTHMAN, [facility]   Care Coordinator  [phone]

## 2022-05-24 ENCOUNTER — OFFICE VISIT (OUTPATIENT)
Dept: PRIMARY CARE CLINIC | Age: 87
End: 2022-05-24
Payer: MEDICARE

## 2022-05-24 VITALS
OXYGEN SATURATION: 96 % | BODY MASS INDEX: 32.66 KG/M2 | WEIGHT: 173 LBS | HEART RATE: 79 BPM | TEMPERATURE: 97.6 F | HEIGHT: 61 IN

## 2022-05-24 VITALS — WEIGHT: 173 LBS | HEIGHT: 61 IN | TEMPERATURE: 97.6 F | BODY MASS INDEX: 32.66 KG/M2

## 2022-05-24 DIAGNOSIS — Z79.4 TYPE 2 DIABETES MELLITUS WITH HYPERGLYCEMIA, WITH LONG-TERM CURRENT USE OF INSULIN (HCC): Primary | ICD-10-CM

## 2022-05-24 DIAGNOSIS — M25.512 CHRONIC PAIN OF BOTH SHOULDERS: ICD-10-CM

## 2022-05-24 DIAGNOSIS — Z00.00 ROUTINE GENERAL MEDICAL EXAMINATION AT A HEALTH CARE FACILITY: ICD-10-CM

## 2022-05-24 DIAGNOSIS — E78.2 MIXED HYPERLIPIDEMIA: ICD-10-CM

## 2022-05-24 DIAGNOSIS — G62.9 NEUROPATHY: ICD-10-CM

## 2022-05-24 DIAGNOSIS — E03.9 ACQUIRED HYPOTHYROIDISM: ICD-10-CM

## 2022-05-24 DIAGNOSIS — G89.29 CHRONIC PAIN OF BOTH SHOULDERS: ICD-10-CM

## 2022-05-24 DIAGNOSIS — M25.511 CHRONIC PAIN OF BOTH SHOULDERS: ICD-10-CM

## 2022-05-24 DIAGNOSIS — N18.32 STAGE 3B CHRONIC KIDNEY DISEASE (HCC): ICD-10-CM

## 2022-05-24 DIAGNOSIS — Z00.00 MEDICARE ANNUAL WELLNESS VISIT, SUBSEQUENT: Primary | ICD-10-CM

## 2022-05-24 DIAGNOSIS — N32.81 OVERACTIVE BLADDER: ICD-10-CM

## 2022-05-24 DIAGNOSIS — E11.65 TYPE 2 DIABETES MELLITUS WITH HYPERGLYCEMIA, WITH LONG-TERM CURRENT USE OF INSULIN (HCC): Primary | ICD-10-CM

## 2022-05-24 DIAGNOSIS — M51.26 HERNIATED LUMBAR INTERVERTEBRAL DISC: ICD-10-CM

## 2022-05-24 DIAGNOSIS — G89.29 OTHER CHRONIC PAIN: ICD-10-CM

## 2022-05-24 DIAGNOSIS — I10 ESSENTIAL HYPERTENSION: ICD-10-CM

## 2022-05-24 PROBLEM — J01.40 ACUTE NON-RECURRENT PANSINUSITIS: Status: RESOLVED | Noted: 2021-04-28 | Resolved: 2022-05-24

## 2022-05-24 PROCEDURE — 3044F HG A1C LEVEL LT 7.0%: CPT | Performed by: INTERNAL MEDICINE

## 2022-05-24 PROCEDURE — G0439 PPPS, SUBSEQ VISIT: HCPCS | Performed by: INTERNAL MEDICINE

## 2022-05-24 PROCEDURE — 99214 OFFICE O/P EST MOD 30 MIN: CPT | Performed by: INTERNAL MEDICINE

## 2022-05-24 RX ORDER — GABAPENTIN 300 MG/1
300 CAPSULE ORAL 2 TIMES DAILY
Qty: 180 CAPSULE | Refills: 1 | Status: SHIPPED
Start: 2022-05-24 | End: 2022-07-26

## 2022-05-24 RX ORDER — BLOOD SUGAR DIAGNOSTIC
STRIP MISCELLANEOUS
Qty: 100 EACH | Refills: 5 | Status: SHIPPED | OUTPATIENT
Start: 2022-05-24

## 2022-05-24 RX ORDER — LANCETS 23 GAUGE
EACH MISCELLANEOUS
Qty: 100 EACH | Refills: 5 | Status: SHIPPED | OUTPATIENT
Start: 2022-05-24

## 2022-05-24 RX ORDER — INSULIN GLARGINE 100 [IU]/ML
12 INJECTION, SOLUTION SUBCUTANEOUS DAILY
Qty: 5 PEN | Refills: 3 | Status: SHIPPED | OUTPATIENT
Start: 2022-05-24

## 2022-05-24 RX ORDER — GLIMEPIRIDE 1 MG/1
1 TABLET ORAL 2 TIMES DAILY
Qty: 180 TABLET | Refills: 3 | Status: SHIPPED
Start: 2022-05-24 | End: 2022-09-09 | Stop reason: SDUPTHER

## 2022-05-24 RX ORDER — HYDROCODONE BITARTRATE AND ACETAMINOPHEN 7.5; 325 MG/1; MG/1
1 TABLET ORAL 2 TIMES DAILY PRN
Qty: 60 TABLET | Refills: 0 | Status: SHIPPED | OUTPATIENT
Start: 2022-05-24 | End: 2022-06-23

## 2022-05-24 ASSESSMENT — ENCOUNTER SYMPTOMS
ABDOMINAL PAIN: 1
COUGH: 0
WHEEZING: 0
BACK PAIN: 1
CONSTIPATION: 0
ABDOMINAL DISTENTION: 0
VOMITING: 0
EYE DISCHARGE: 0
SHORTNESS OF BREATH: 0
APNEA: 0
EYE REDNESS: 0
EYE PAIN: 0
NAUSEA: 0
SINUS PAIN: 0
SINUS PRESSURE: 0
CHEST TIGHTNESS: 0
EYE ITCHING: 0

## 2022-05-24 ASSESSMENT — PATIENT HEALTH QUESTIONNAIRE - PHQ9
4. FEELING TIRED OR HAVING LITTLE ENERGY: 0
3. TROUBLE FALLING OR STAYING ASLEEP: 0
SUM OF ALL RESPONSES TO PHQ QUESTIONS 1-9: 0
8. MOVING OR SPEAKING SO SLOWLY THAT OTHER PEOPLE COULD HAVE NOTICED. OR THE OPPOSITE, BEING SO FIGETY OR RESTLESS THAT YOU HAVE BEEN MOVING AROUND A LOT MORE THAN USUAL: 0
1. LITTLE INTEREST OR PLEASURE IN DOING THINGS: 0
6. FEELING BAD ABOUT YOURSELF - OR THAT YOU ARE A FAILURE OR HAVE LET YOURSELF OR YOUR FAMILY DOWN: 0
SUM OF ALL RESPONSES TO PHQ QUESTIONS 1-9: 0
10. IF YOU CHECKED OFF ANY PROBLEMS, HOW DIFFICULT HAVE THESE PROBLEMS MADE IT FOR YOU TO DO YOUR WORK, TAKE CARE OF THINGS AT HOME, OR GET ALONG WITH OTHER PEOPLE: 0
SUM OF ALL RESPONSES TO PHQ9 QUESTIONS 1 & 2: 0
1. LITTLE INTEREST OR PLEASURE IN DOING THINGS: 0
5. POOR APPETITE OR OVEREATING: 0
2. FEELING DOWN, DEPRESSED OR HOPELESS: 0
SUM OF ALL RESPONSES TO PHQ QUESTIONS 1-9: 0
7. TROUBLE CONCENTRATING ON THINGS, SUCH AS READING THE NEWSPAPER OR WATCHING TELEVISION: 0
9. THOUGHTS THAT YOU WOULD BE BETTER OFF DEAD, OR OF HURTING YOURSELF: 0
SUM OF ALL RESPONSES TO PHQ QUESTIONS 1-9: 0
SUM OF ALL RESPONSES TO PHQ9 QUESTIONS 1 & 2: 0
SUM OF ALL RESPONSES TO PHQ QUESTIONS 1-9: 0
SUM OF ALL RESPONSES TO PHQ QUESTIONS 1-9: 0
2. FEELING DOWN, DEPRESSED OR HOPELESS: 0

## 2022-05-24 ASSESSMENT — LIFESTYLE VARIABLES: HOW OFTEN DO YOU HAVE A DRINK CONTAINING ALCOHOL: NEVER

## 2022-05-24 NOTE — ASSESSMENT & PLAN NOTE
Blood pressures are stable. Continue medications and monitor blood pressures at home. Call office if systolics are over 075 over diastolics over 90.

## 2022-05-24 NOTE — PROGRESS NOTES
Medicare Annual Wellness Visit    Sandie Mathew is here for Medicare AWV     Recommendations for Preventive Services Due: see orders and patient instructions/AVS.  Recommended screening schedule for the next 5-10 years is provided to the patient in written form: see Patient Instructions/AVS.     No follow-ups on file. Subjective       Patient's complete Health Risk Assessment and screening values have been reviewed and are found in Flowsheets. The following problems were reviewed today and where indicated follow up appointments were made and/or referrals ordered.     Positive Risk Factor Screenings with Interventions:              Health Habits/Nutrition:     Physical Activity: Inactive    Days of Exercise per Week: 0 days    Minutes of Exercise per Session: 0 min     Have you lost any weight without trying in the past 3 months?: No  Body mass index: (!) 32.68  Have you seen the dentist within the past year?: N/A - wear dentures    Health Habits/Nutrition Interventions:  · Nutritional issues:  educational materials for healthy, well-balanced diet provided    Hearing/Vision:  Do you or your family notice any trouble with your hearing that hasn't been managed with hearing aids?: (!) Yes  Do you have difficulty driving, watching TV, or doing any of your daily activities because of your eyesight?: No  Have you had an eye exam within the past year?: Yes  No exam data present    Hearing/Vision Interventions:  · Hearing concerns:  patient declines any further evaluation/treatment for hearing issues    Safety:  Do you have working smoke detectors?: Yes  Do you have any tripping hazards - loose or unsecured carpets or rugs?: No  Do you have any tripping hazards - clutter in doorways, halls, or stairs?: No  Do you have either shower bars, grab bars, non-slip mats or non-slip surfaces in your shower or bathtub?: (!) No  Do all of your stairways have a railing or banister?: Yes  Do you always fasten your seatbelt when you are in a car?: Yes    Safety Interventions:  · Home safety tips provided    ADLs:  In the past 7 days, did you need help from others to perform any of the following everyday activities: Eating, dressing, grooming, bathing, toileting, or walking/balance?: No  In the past 7 days, did you need help from others to take care of any of the following: Laundry, housekeeping, banking/finances, shopping, telephone use, food preparation, transportation, or taking medications?: (!) Yes  Select all that apply: (!) Shopping,Transportation    ADL Interventions:  · family helps with transportation and shopping          Objective   Vitals:    05/24/22 1447   Pulse: 79   Temp: 97.6 °F (36.4 °C)   SpO2: 96%   Weight: 173 lb (78.5 kg)   Height: 5' 1\" (1.549 m)      Body mass index is 32.69 kg/m². Allergies   Allergen Reactions    Hydrocodone-Acetaminophen      Other reaction(s): Unknown    Ibuprofen     Lisinopril      Other reaction(s): Cough    Nsaids Other (See Comments)    Pregabalin Swelling    Simvastatin     Statins Other (See Comments)     Muscle weakness      Prior to Visit Medications    Medication Sig Taking?  Authorizing Provider   conjugated estrogens (PREMARIN) 0.625 MG/GM vaginal cream Place vaginally daily Yes Historical Provider, MD   lansoprazole (PREVACID) 30 MG delayed release capsule Take 1 capsule by mouth daily Yes Queta Mckoy DO   levothyroxine (SYNTHROID) 50 MCG tablet Take 1 tablet by mouth Daily Monday to Friday (not Saturday or Sunday) Yes Queta Mckoy DO   PENTIPS 31G X 8 MM MISC USE AS DIRECTED DAILY Yes Queta Mckoy DO   CALCIUM-MAGNESIUM-ZINC PO Take 1 tablet by mouth in the morning and at bedtime  Yes Historical Provider, MD   amLODIPine (NORVASC) 10 MG tablet Take 1 tablet by mouth daily Yes Queta Mckoy DO   ezetimibe (ZETIA) 10 MG tablet Take 1 tablet by mouth daily Yes Queta Mckoy DO   Handicap Placard MISC by Does not apply route Duration 5 years Yes Queta risk including:   · Quitting tobacco use, reducing amount smoked, or not starting the habit  · Making healthy food choices  · Being physically active and gradualy increasing activity levels   · Reduce weight and determine a healthy BMI goal  · Monitor blood pressure and treat if higher than 140/90 mmHg  · Maintain blood total cholesterol levels under 5 mmol/l or 190 mg/dl  · Maintain LDL cholesterol levels under 3.0 mmol/l or 115 mg/dl   · Control blood glucose levels  · Consider taking aspirin (75 mg daily), once blood pressure is controlled   Provided a follow up plan. Time spent (minutes): 5  Obesity Counseling: Assessed behavioral health risks and factors affecting choice of behavior. Suggested weight control approaches, including dietary changes behavioral modification and follow up plan. Provided educational and support documentation.   Time spent (minutes): 5

## 2022-05-24 NOTE — ASSESSMENT & PLAN NOTE
OARRS report reviewed and she has not been on Norco for a while. She has a medication contract on file. We discussed addictive potential and worsening of chronic idiopathic constipation. She says that she needs something to help with her pain and is willing to risk these problems. She has tried tramadol and Tylenol which do nothing for the pain.   She is unable to take NSAID's because of her GERD and CKD (3) walks occasionally

## 2022-05-24 NOTE — ASSESSMENT & PLAN NOTE
see if she can go back and see Dr. Spencer Patterson or orthopod of choice for shoulder injections.   Otherwise stay on her Norco

## 2022-05-24 NOTE — ASSESSMENT & PLAN NOTE
continue medications, she has no hypoglycemic episodes.   Check hemoglobin A1c and urine microalbumin creatinine ratio

## 2022-05-24 NOTE — PATIENT INSTRUCTIONS
DASH Diet: Care Instructions  Your Care Instructions     The DASH diet is an eating plan that can help lower your blood pressure. DASH stands for Dietary Approaches to Stop Hypertension. Hypertension is high bloodpressure. The DASH diet focuses on eating foods that are high in calcium, potassium, and magnesium. These nutrients can lower blood pressure. The foods that are highest in these nutrients are fruits, vegetables, low-fat dairy products, nuts, seeds, and legumes. But taking calcium, potassium, and magnesium supplements instead of eating foods that are high in those nutrients does not have the same effect. The DASH diet also includes whole grains, fish, and poultry. The DASH diet is one of several lifestyle changes your doctor may recommend to lower your high blood pressure. Your doctor may also want you to decrease the amount of sodium in your diet. Lowering sodium while following the DASH dietcan lower blood pressure even further than just the DASH diet alone. Follow-up care is a key part of your treatment and safety. Be sure to make and go to all appointments, and call your doctor if you are having problems. It's also a good idea to know your test results and keep alist of the medicines you take. How can you care for yourself at home? Following the DASH diet   Eat 4 to 5 servings of fruit each day. A serving is 1 medium-sized piece of fruit, ½ cup chopped or canned fruit, 1/4 cup dried fruit, or 4 ounces (½ cup) of fruit juice. Choose fruit more often than fruit juice.  Eat 4 to 5 servings of vegetables each day. A serving is 1 cup of lettuce or raw leafy vegetables, ½ cup of chopped or cooked vegetables, or 4 ounces (½ cup) of vegetable juice. Choose vegetables more often than vegetable juice.  Get 2 to 3 servings of low-fat and fat-free dairy each day. A serving is 8 ounces of milk, 1 cup of yogurt, or 1 ½ ounces of cheese.  Eat 6 to 8 servings of grains each day.  A serving is 1 slice of bread, 1 ounce of dry cereal, or ½ cup of cooked rice, pasta, or cooked cereal. Try to choose whole-grain products as much as possible.  Limit lean meat, poultry, and fish to 2 servings each day. A serving is 3 ounces, about the size of a deck of cards.  Eat 4 to 5 servings of nuts, seeds, and legumes (cooked dried beans, lentils, and split peas) each week. A serving is 1/3 cup of nuts, 2 tablespoons of seeds, or ½ cup of cooked beans or peas.  Limit fats and oils to 2 to 3 servings each day. A serving is 1 teaspoon of vegetable oil or 2 tablespoons of salad dressing.  Limit sweets and added sugars to 5 servings or less a week. A serving is 1 tablespoon jelly or jam, ½ cup sorbet, or 1 cup of lemonade.  Eat less than 2,300 milligrams (mg) of sodium a day. If you limit your sodium to 1,500 mg a day, you can lower your blood pressure even more.  Be aware that all of these are the suggested number of servings for people who eat 1,800 to 2,000 calories a day. Your recommended number of servings may be different if you need more or fewer calories. Tips for success   Start small. Do not try to make dramatic changes to your diet all at once. You might feel that you are missing out on your favorite foods and then be more likely to not follow the plan. Make small changes, and stick with them. Once those changes become habit, add a few more changes.  Try some of the following:  ? Make it a goal to eat a fruit or vegetable at every meal and at snacks. This will make it easy to get the recommended amount of fruits and vegetables each day. ? Try yogurt topped with fruit and nuts for a snack or healthy dessert. ? Add lettuce, tomato, cucumber, and onion to sandwiches. ? Combine a ready-made pizza crust with low-fat mozzarella cheese and lots of vegetable toppings. Try using tomatoes, squash, spinach, broccoli, carrots, cauliflower, and onions. ?  Have a variety of cut-up vegetables with a low-fat dip as an appetizer instead of chips and dip. ? Sprinkle sunflower seeds or chopped almonds over salads. Or try adding chopped walnuts or almonds to cooked vegetables. ? Try some vegetarian meals using beans and peas. Add garbanzo or kidney beans to salads. Make burritos and tacos with mashed alvarado beans or black beans. Where can you learn more? Go to https://[email or web address].Voxound. org and sign in to your [employer] account. Enter E484 in the [employer] box to learn more about \"DASH Diet: Care Instructions. \"     If you do not have an account, please click on the \"Sign Up Now\" link. Current as of: January 10, 2022               Content Version: 13.2  © 2572-7822 [employer]. Care instructions adapted under license by [facility] [place] ([facility]). If you have questions about a medical condition or this instruction, always ask your healthcare professional. [place] [address] any warranty or liability for your use of this information. Personalized Preventive Plan for Lilian Hendrickson - 5/24/2022  Medicare offers a range of preventive health benefits. Some of the tests and screenings are paid in full while other may be subject to a deductible, co-insurance, and/or copay. Some of these benefits include a comprehensive review of your medical history including lifestyle, illnesses that may run in your family, and various assessments and screenings as appropriate. After reviewing your medical record and screening and assessments performed today your provider may have ordered immunizations, labs, imaging, and/or referrals for you. A list of these orders (if applicable) as well as your Preventive Care list are included within your After Visit Summary for your review. Other Preventive Recommendations:    · A preventive eye exam performed by an eye specialist is recommended every 1-2 years to screen for glaucoma; cataracts, macular degeneration, and other eye disorders.   · A preventive dental visit is recommended every 6 months. · Try to get at least 150 minutes of exercise per week or 10,000 steps per day on a pedometer . · Order or download the FREE \"Exercise & Physical Activity: Your Everyday Guide\" from [employer]. Call 0-204.817.8974 or search [employer] online. · You need 2869-5943 mg of calcium and [phone] IU of vitamin D per day. It is possible to meet your calcium requirement with diet alone, but a vitamin D supplement is usually necessary to meet this goal.  · When exposed to the sun, use a sunscreen that protects against both UVA and UVB radiation with an SPF of 30 or greater. Reapply every 2 to 3 hours or after sweating, drying off with a towel, or swimming. · Always wear a seat belt when traveling in a car. Always wear a helmet when riding a bicycle or motorcycle.

## 2022-05-24 NOTE — ASSESSMENT & PLAN NOTE
patient unable to take NSAID's orally because of GI upset. She will try Voltaren gel and see if her insurance will pay for it.

## 2022-05-24 NOTE — PROGRESS NOTES
2022    Name: Iqra Heart : 1935 Sex: female  Age: 81 y.o. Subjective:  Chief Complaint   Patient presents with    Hypertension        Hypertension  Pertinent negatives include no chest pain, neck pain, palpitations or shortness of breath. .    She has a history of type 2 diabetes mellitus and takes her glimepiride most of the time but sometimes if her blood sugar is low she will not take it. She is also on Lantus 12 units daily. Blood sugars have been anywhere from 100-200 but when she gets sick it went higher than that. Hemoglobin A1c was 6.5%. Jacqui Dye History of hypothyroidism on levothyroxine 50 mcg a day. Her TSH was low so we cut back her levothyroxine 50 mcg to 5 days a week. We will need to recheck her TSH and free T4. History of gastritis and GERD on Prevacid 30 mg a day    History of diabetic neuropathy on gabapentin 300 mg a day. She still has problems and would like to increase it to 300 mg twice daily. We are going to send in a new prescription. History of hyperlipidemia on ezetimibe 10 mg a day. In 2022  Total cholesterol 217, LDL cholesterol 127, HDL cholesterol 59 and triglycerides 154. She is intolerant to statins. History of hypertension on amlodipine 10 mg a day. Blood pressures are acceptable at home. She has a history of chronic kidney disease and her last creatinine was 1.3, BUN 50  Estimated GFR was 39. She had a history of iron deficiency anemia at one time. Her hemoglobin is not bad at 12.2 with a hematocrit of 39.5 but her indices are microcytic and hypochromic. . Iron studies were normal    Her white blood cell count in 2022 was 21,600 however the day after her blood work was done she came down with a severe viral gastroenteritis. Her checked a home Covid test on her which was negative but the patient had severe diarrhea and abdominal pain for almost a week.   This has since subsided she was unable to get her stool studies done. We will recheck her white blood cell count. History of severe back pain with radiculitis, history of herniated lumbar disc and radiculopathy. She is on gabapentin which we increased to 300 mg a day and tramadol 50 mg 1 a day as needed. OARRS report reviewed and the last time she was given tramadol was on November 17, 2021. She received tramadol 50 mg 120 pills. Chronic pain checkpoints reviewed. She says the tramadol plus Tylenol is not helping her pain at all. She complains that she is hardly able to get up out of bed because of severe pain in her neck, back, arms and legs. She cannot go see Dr. Vivian Mullins because her insurance does not pay for [facility]. I recommended Dr. Camilla Armijo but she is not willing to go at this time. She says it is very difficult for her to get out of the house and go to different doctors. X-rays of her cervical spine and a CT scan of her lumbar spine  Were done last year. 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 She has degenerative disc disease and spinal stenosis of her lumbar spine with a herniated disc L4/L5 impressing on the left L5 nerve root which is where she has the most pain on the left side . She saw Dr. Melinda Bellamy earlier this year and he x-rayed her shoulder. Reviewed report. He also gave her a shot which did help somewhat. She said increased pain in her right shoulder now is having pain in her left shoulder. He ended up giving her steroid shots in both shoulders. She complains of numbness and burning of her feet. She also has leg pain worse on the left. I wonder if she has underlying diabetic neuropathy as well as her left lumbar radiculopathy. I discussed obtaining EMG studies on her but patient refuses to do this she says she will think about it    History of urinary urgency and frequency. Saw urologist who said nothing could be done.   Then saw surgeon in [place] Dr. Christopher Gramajo who did surgery on her but unfortunately this did not work very well. She said Myrbetriq worked for a while so she is willing to try that again. We will give her a prescription. History of gastroparesis. Saw initially . MARIELOS  in [place] and was referred to Dr. Winter Bermudez in [facility] who said that she was too old for surgery. She saw a nurse practitioner at Dr. Katie To office. They said not much else can be done for her at this time. She would like to see Dr. Vara Harada who did her cataract surgery. She is having trouble seeing numbers. She  had her Covid vaccines along with her booster shot. She had her flu shot. She had her Shingrix vaccines, Tdap and Pneumovax    Review of Systems   Constitutional: Positive for fatigue. Negative for fever. HENT: Negative for congestion, ear pain, postnasal drip, sinus pressure and sinus pain. Eyes: Positive for visual disturbance. Negative for pain, discharge, redness and itching. Has trouble reading numbers   Respiratory: Negative for apnea, cough, chest tightness, shortness of breath and wheezing. Cardiovascular: Negative for chest pain, palpitations and leg swelling. Gastrointestinal: Positive for abdominal pain. Negative for abdominal distention, constipation, nausea and vomiting. Endocrine: Negative for cold intolerance, heat intolerance and polyuria. Genitourinary: Positive for frequency and urgency. Negative for difficulty urinating, dysuria, flank pain and pelvic pain. Musculoskeletal: Positive for arthralgias and back pain. Negative for gait problem, joint swelling, myalgias, neck pain and neck stiffness. Neurological: Negative for dizziness, tremors, seizures and syncope. Psychiatric/Behavioral: Negative for agitation, confusion and sleep disturbance.           Current Outpatient Medications:     glimepiride (AMARYL) 1 MG tablet, Take 1 tablet by mouth 2 times daily, Disp: 180 tablet, Rfl: 3    gabapentin (NEURONTIN) 300 MG capsule, Take 1 capsule by mouth 2 times daily for 90 days. , Disp: 180 capsule, Rfl: 1    blood glucose test strips (ONETOUCH VERIO) strip, TEST 2 TIMES DAILY AS NEEDED (SYMPTOMS OF LOW BLOOD SUGAR) AS INSTRUCTED, Disp: 100 each, Rfl: 5    insulin glargine (LANTUS SOLOSTAR) 100 UNIT/ML injection pen, Inject 12 Units into the skin daily, Disp: 5 pen, Rfl: 3    Lancets 28G MISC, Test blood sugar twice daily and as needed for symptoms of low blood sugar., Disp: 100 each, Rfl: 5    diclofenac sodium (VOLTAREN) 1 % GEL, Apply 2 g topically 4 times daily, Disp: 150 g, Rfl: 3    mirabegron (MYRBETRIQ) 25 MG TB24, Take 1 tablet by mouth daily, Disp: 30 tablet, Rfl: 3    HYDROcodone-acetaminophen (NORCO) 7.5-325 MG per tablet, Take 1 tablet by mouth 2 times daily as needed for Pain for up to 30 days.  Intended supply: 30 days, Disp: 60 tablet, Rfl: 0    conjugated estrogens (PREMARIN) 0.625 MG/GM vaginal cream, Place vaginally daily, Disp: , Rfl:     lansoprazole (PREVACID) 30 MG delayed release capsule, Take 1 capsule by mouth daily, Disp: 90 capsule, Rfl: 3    levothyroxine (SYNTHROID) 50 MCG tablet, Take 1 tablet by mouth Daily Monday to Friday (not Saturday or Sunday), Disp: 90 tablet, Rfl: 3    PENTIPS 31G X 8 MM MISC, USE AS DIRECTED DAILY, Disp: 100 each, Rfl: 2    CALCIUM-MAGNESIUM-ZINC PO, Take 1 tablet by mouth in the morning and at bedtime , Disp: , Rfl:     amLODIPine (NORVASC) 10 MG tablet, Take 1 tablet by mouth daily, Disp: 90 tablet, Rfl: 3    ezetimibe (ZETIA) 10 MG tablet, Take 1 tablet by mouth daily, Disp: 90 tablet, Rfl: 3    Handicap Placard MISC, by Does not apply route Duration 5 years, Disp: 2 each, Rfl: 0    Ascorbic Acid (UNRULY-C PO), Take by mouth daily, Disp: , Rfl:     vitamin D (CHOLECALCIFEROL) 25 MCG (1000 UT) TABS tablet, Take 1,000 Units by mouth daily, Disp: , Rfl:     Blood Glucose Monitoring Suppl (BLOOD GLUCOSE MONITOR SYSTEM) w/Device KIT, Test blood sugar twice daily, and as needed for symptoms of low blood sugar. (Patient is insulin dependant.), Disp: 1 kit, Rfl: 0    Calcium Ascorbate 500 MG TABS, Take 500 mg by mouth daily , Disp: , Rfl:      Allergies   Allergen Reactions    Hydrocodone-Acetaminophen      Other reaction(s): Unknown    Ibuprofen     Lisinopril      Other reaction(s): Cough    Nsaids Other (See Comments)    Pregabalin Swelling    Simvastatin     Statins Other (See Comments)     Muscle weakness         Past Medical History:   Diagnosis Date    Anemia     Chronic kidney disease     Stage III    Chronic pain     Cirrhosis (HCC)     Depression     Fibromyalgia     Gastroparesis     Pernicious anemia     Thyroid nodule        There are no preventive care reminders to display for this patient.      Patient Active Problem List   Diagnosis    Essential hypertension    Slow transit constipation    Type 2 diabetes mellitus (HCC)    Gastroparesis    Chronic pain    Fibromyalgia    Thyroid nodule    Pernicious anemia    Hypothyroidism    Abnormal liver enzymes    Bladder prolapse, female, acquired    Urge incontinence    Fatty liver    Depression    Mixed hyperlipidemia    Chronic kidney disease    Dysuria    Overactive bladder    Herniated lumbar intervertebral disc    Left lumbar radiculopathy    Gastroesophageal reflux disease    Muscle spasm    Chronic pain of both shoulders    Vitamin B12 deficiency    Cervical radiculitis    Vitamin D deficiency    Need for shingles vaccine    Neuropathy    Visual problems    Iron deficiency    Diarrhea of presumed infectious origin    Primary osteoarthritis    Leucocytosis        Past Surgical History:   Procedure Laterality Date    APPENDECTOMY      CHOLECYSTECTOMY      EYE SURGERY      cataract OU    HEMORRHOID SURGERY      HYSTERECTOMY, TOTAL ABDOMINAL      JOINT REPLACEMENT Left     Knee     KNEE ARTHROPLASTY Right     SUBTOTAL COLECTOMY          Family History   Problem Relation Age of Onset    Other Mother         cva    High Blood Pressure Mother     Other Father         heart disease         Social History     Tobacco Use    Smoking status: Never Smoker    Smokeless tobacco: Never Used   Substance Use Topics    Alcohol use: Not Currently    Drug use: Not Currently     Comment: Norco 5/325        Objective  Vitals:    05/24/22 1434   Temp: 97.6 °F (36.4 °C)   Weight: 173 lb (78.5 kg)   Height: 5' 1\" (1.549 m)        Exam:  Physical Exam  Vitals reviewed. Constitutional:       General: She is not in acute distress. Appearance: Normal appearance. She is obese. She is not ill-appearing. HENT:      Head: Normocephalic and atraumatic. Right Ear: External ear normal.      Left Ear: External ear normal.   Eyes:      General: No scleral icterus. Right eye: No discharge. Left eye: No discharge. Conjunctiva/sclera: Conjunctivae normal.   Neck:      Comments: Tender to palpation along the cervical spine  Cardiovascular:      Rate and Rhythm: Normal rate and regular rhythm. Pulses: Normal pulses. Heart sounds: Normal heart sounds. No murmur heard. Pulmonary:      Effort: Pulmonary effort is normal. No respiratory distress. Breath sounds: No rales. Abdominal:      General: Bowel sounds are normal. There is no distension. Palpations: Abdomen is soft. There is no mass. Tenderness: There is no abdominal tenderness. Musculoskeletal:         General: Tenderness present. Normal range of motion. Cervical back: Normal range of motion and neck supple. No rigidity. No muscular tenderness. Comments: Tenderness to palpation of both shoulders, tenderness on palpation of her lower lumbar spine. Decreased range of motion to internal and external rotation of both shoulders, slightly decreased flexion of her dorsal spine. Lymphadenopathy:      Cervical: No cervical adenopathy. Skin:     General: Skin is warm and dry. Coloration: Skin is pale.  Skin is not jaundiced. Neurological:      Mental Status: She is alert and oriented to person, place, and time. Motor: No weakness. Gait: Gait normal.      Comments: Decreased sensation to light touch from the mid calf down bilaterally. Psychiatric:         Mood and Affect: Mood normal.         Behavior: Behavior normal.         Thought Content: Thought content normal.         Judgment: Judgment normal.          Last labs reviewed. ASSESSMENT & PLAN :   Problem List        Circulatory    Essential hypertension     Blood pressures are stable. Continue medications and monitor blood pressures at home. Call office if systolics are over 539 over diastolics over 90. Relevant Medications    amLODIPine (NORVASC) 10 MG tablet       Endocrine    Type 2 diabetes mellitus ([facility] [address] - Primary       continue medications, she has no hypoglycemic episodes. Check hemoglobin A1c and urine microalbumin creatinine ratio         Relevant Medications    Blood Glucose Monitoring Suppl (BLOOD GLUCOSE MONITOR SYSTEM) w/Device KIT    PENTIPS 31G X 8 MM MISC    glimepiride (AMARYL) 1 MG tablet    blood glucose test strips (ONETOUCH VERIO) strip    insulin glargine (LANTUS SOLOSTAR) 100 UNIT/ML injection pen    Lancets 28G MISC    Other Relevant Orders    Hemoglobin A1C    Microalbumin / Creatinine Urine Ratio    Hypothyroidism       check TSH and free T4, continue on present dose of levothyroxine         Relevant Medications    levothyroxine (SYNTHROID) 50 MCG tablet    Other Relevant Orders    T4, Free    TSH       Nervous and Auditory    Neuropathy       continue gabapentin she takes about 2 pills a day. Relevant Medications    gabapentin (NEURONTIN) 300 MG capsule       Musculoskeletal and Integument    Herniated lumbar intervertebral disc       patient unable to take NSAID's orally because of GI upset. She will try Voltaren gel and see if her insurance will pay for it.          Relevant Medications    Handicap Placard [facility]       Genitourinary    Chronic kidney disease       avoid NSAID's and will monitor blood work          Relevant Orders    CBC with Auto Differential    Comprehensive Metabolic Panel    Overactive bladder       trial Myrbetriq and see if that helps         Relevant Medications    mirabegron (MYRBETRIQ) 25 MG TB24       Other    Chronic pain       OARRS report reviewed and she has not been on Norco for a while. She has a medication contract on file. We discussed addictive potential and worsening of chronic idiopathic constipation. She says that she needs something to help with her pain and is willing to risk these problems. She has tried tramadol and Tylenol which do nothing for the pain. She is unable to take NSAID's because of her GERD and CKD         Relevant Medications    gabapentin (NEURONTIN) 300 MG capsule    HYDROcodone-acetaminophen (NORCO) 7.5-325 MG per tablet    Mixed hyperlipidemia       watch diet, check lipids, continue ezetimibe 10 mg, intolerant to statins         Relevant Medications    amLODIPine (NORVASC) 10 MG tablet    ezetimibe (ZETIA) 10 MG tablet    Other Relevant Orders    Lipid Panel    Chronic pain of both shoulders       see if she can go back and see Dr. Ivette Portillo or orthopod of choice for shoulder injections. Otherwise stay on her Norco         Relevant Medications    gabapentin (NEURONTIN) 300 MG capsule    diclofenac sodium (VOLTAREN) 1 % GEL    HYDROcodone-acetaminophen (NORCO) 7.5-325 MG per tablet    Other Relevant Orders    Comprehensive Metabolic Panel           Return in about 1 month (around 6/24/2022), or med refill.        Carvel Petties, DO  5/24/2022

## 2022-05-25 ENCOUNTER — CARE COORDINATION (OUTPATIENT)
Dept: CARE COORDINATION | Age: 87
End: 2022-05-25

## 2022-05-25 NOTE — CARE COORDINATION
Attempted to contact pt for CC f/u, no answer. Left a VM asking for a call back, contact information given. Will try to reach pt another time.

## 2022-05-27 ENCOUNTER — CARE COORDINATION (OUTPATIENT)
Dept: CARE COORDINATION | Age: 87
End: 2022-05-27

## 2022-05-31 ENCOUNTER — CARE COORDINATION (OUTPATIENT)
Dept: CARE COORDINATION | Age: 87
End: 2022-05-31

## 2022-05-31 NOTE — CARE COORDINATION
[facility] made call to pt to initiate SW referral, unable to reach, left message requesting call back to this [facility] with contact info provided.     Hanny ROTHMAN, [facility]   Care Coordinator  [phone]

## 2022-06-02 ENCOUNTER — CARE COORDINATION (OUTPATIENT)
Dept: CARE COORDINATION | Age: 87
End: 2022-06-02

## 2022-06-02 NOTE — CARE COORDINATION
Attempted to contact pt for CC f/u, no answer. Left a VM asking for a call back, contact information given, will try to reach pt another time.

## 2022-06-08 ENCOUNTER — CARE COORDINATION (OUTPATIENT)
Dept: CARE COORDINATION | Age: 87
End: 2022-06-08
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
## 2022-06-09 ENCOUNTER — CARE COORDINATION (OUTPATIENT)
Dept: CARE COORDINATION | Age: 87
End: 2022-06-09

## 2022-06-16 ENCOUNTER — CARE COORDINATION (OUTPATIENT)
Dept: CARE COORDINATION | Age: 87
End: 2022-06-16

## 2022-06-16 NOTE — CARE COORDINATION
Final attempt to contact pt for CC f/u, no answer. Left a VM asking for a call back, contact information given. Will have unable to reach letter mailed to pt. If no call back, no further outreaches will be made at this time. Will update PCP.

## 2022-06-16 NOTE — CARE COORDINATION
[facility] received email update from [facility], Isaiah Reyes stating she was unable to reach pt but did talk with pt's dtr Georgette Boudreaux. Emily Alvino plcaed referral for healthy grocery benefit $50 per month and life alert button. Emily De Oliveira emailed pt's dtr the the OTC catalog for dtr to look into shower chair. [facility] made call to [facility], Isaiah Reyes, requesting call back to this [facility] with contact info provided. [facility] made f/u call to pt. Pt reports she is \"fair\", cold all of the time and her PCP is aware, will be getting blood work on 6/22. Pt reports she had not been feeling good recently, [facility] offered to assist pt with call to PCP to schedule an appt, pt declined offer, reports she had appt on 6/22. [facility] reviewed above info from update from [facility], Emily De Oliveira. Pt reports she is not aware that her dtr spoke with Neda Yadav. Pt reports her dtr Georgette Boudreaux is authorized to talk with [place] and pt will talk with dtr. Pt reports she received a free life alert button in the past but it did not work d/t her location. Pt had no questions or needs at this time. [facility] to f/u.     Butch Yadav MSSURJIT, [facility]   Care Coordinator  [phone]

## 2022-06-16 NOTE — LETTER
6/16/2022    Tennille Montenegro  [address] [facility]     I have enjoyed working with you to improve your health. I would like to continue to provide you support. However, I have been unable to reach you at, [phone] (home) . Please let me know if I can help answer any questions. If you would like continued access to your Nurse Care Coordinator, James Sauceda RN, you can reach me at [phone]. I will wait to hear from you and will no longer reach out to you. Caren Schaefer DO and his/her team will continue to provide care and be available for questions.       In good health,     Neetu Green, BSN, RN  Ambulatory Care Manager

## 2022-06-17 DIAGNOSIS — E78.2 MIXED HYPERLIPIDEMIA: ICD-10-CM

## 2022-06-17 DIAGNOSIS — G89.29 CHRONIC PAIN OF BOTH SHOULDERS: ICD-10-CM

## 2022-06-17 DIAGNOSIS — Z79.4 TYPE 2 DIABETES MELLITUS WITH HYPERGLYCEMIA, WITH LONG-TERM CURRENT USE OF INSULIN (HCC): ICD-10-CM

## 2022-06-17 DIAGNOSIS — M25.512 CHRONIC PAIN OF BOTH SHOULDERS: ICD-10-CM

## 2022-06-17 DIAGNOSIS — N18.32 STAGE 3B CHRONIC KIDNEY DISEASE (HCC): ICD-10-CM

## 2022-06-17 DIAGNOSIS — M25.511 CHRONIC PAIN OF BOTH SHOULDERS: ICD-10-CM

## 2022-06-17 DIAGNOSIS — E11.65 TYPE 2 DIABETES MELLITUS WITH HYPERGLYCEMIA, WITH LONG-TERM CURRENT USE OF INSULIN (HCC): ICD-10-CM

## 2022-06-17 DIAGNOSIS — E03.9 ACQUIRED HYPOTHYROIDISM: ICD-10-CM

## 2022-06-17 LAB
ALBUMIN SERPL-MCNC: 4.3 G/DL (ref 3.5–5.2)
ALP BLD-CCNC: 102 U/L (ref 35–104)
ALT SERPL-CCNC: 12 U/L (ref 0–32)
ANION GAP SERPL CALCULATED.3IONS-SCNC: 16 MMOL/L (ref 7–16)
AST SERPL-CCNC: 18 U/L (ref 0–31)
BASOPHILS ABSOLUTE: 0.05 E9/L (ref 0–0.2)
BASOPHILS RELATIVE PERCENT: 0.4 % (ref 0–2)
BILIRUB SERPL-MCNC: 0.4 MG/DL (ref 0–1.2)
BUN BLDV-MCNC: 26 MG/DL (ref 6–23)
CALCIUM SERPL-MCNC: 9.1 MG/DL (ref 8.6–10.2)
CHLORIDE BLD-SCNC: 102 MMOL/L (ref 98–107)
CHOLESTEROL, TOTAL: 181 MG/DL (ref 0–199)
CO2: 23 MMOL/L (ref 22–29)
CREAT SERPL-MCNC: 1.6 MG/DL (ref 0.5–1)
CREATININE URINE: 90 MG/DL (ref 29–226)
EOSINOPHILS ABSOLUTE: 0.15 E9/L (ref 0.05–0.5)
EOSINOPHILS RELATIVE PERCENT: 1.3 % (ref 0–6)
GFR AFRICAN AMERICAN: 37
GFR NON-AFRICAN AMERICAN: 31 ML/MIN/1.73
GLUCOSE BLD-MCNC: 172 MG/DL (ref 74–99)
HBA1C MFR BLD: 6.9 % (ref 4–5.6)
HCT VFR BLD CALC: 38.6 % (ref 34–48)
HDLC SERPL-MCNC: 50 MG/DL
HEMOGLOBIN: 11.8 G/DL (ref 11.5–15.5)
IMMATURE GRANULOCYTES #: 0.04 E9/L
IMMATURE GRANULOCYTES %: 0.3 % (ref 0–5)
LDL CHOLESTEROL CALCULATED: 102 MG/DL (ref 0–99)
LYMPHOCYTES ABSOLUTE: 1.7 E9/L (ref 1.5–4)
LYMPHOCYTES RELATIVE PERCENT: 14.7 % (ref 20–42)
MCH RBC QN AUTO: 24.2 PG (ref 26–35)
MCHC RBC AUTO-ENTMCNC: 30.6 % (ref 32–34.5)
MCV RBC AUTO: 79.1 FL (ref 80–99.9)
MICROALBUMIN UR-MCNC: 60.2 MG/L
MICROALBUMIN/CREAT UR-RTO: 66.9 (ref 0–30)
MONOCYTES ABSOLUTE: 0.88 E9/L (ref 0.1–0.95)
MONOCYTES RELATIVE PERCENT: 7.6 % (ref 2–12)
NEUTROPHILS ABSOLUTE: 8.71 E9/L (ref 1.8–7.3)
NEUTROPHILS RELATIVE PERCENT: 75.7 % (ref 43–80)
PDW BLD-RTO: 13.6 FL (ref 11.5–15)
PLATELET # BLD: 304 E9/L (ref 130–450)
PMV BLD AUTO: 11.1 FL (ref 7–12)
POTASSIUM SERPL-SCNC: 4.6 MMOL/L (ref 3.5–5)
RBC # BLD: 4.88 E12/L (ref 3.5–5.5)
SODIUM BLD-SCNC: 141 MMOL/L (ref 132–146)
TOTAL PROTEIN: 7.3 G/DL (ref 6.4–8.3)
TRIGL SERPL-MCNC: 147 MG/DL (ref 0–149)
VLDLC SERPL CALC-MCNC: 29 MG/DL
WBC # BLD: 11.5 E9/L (ref 4.5–11.5)

## 2022-06-18 LAB
T4 FREE: 1.19 NG/DL (ref 0.93–1.7)
TSH SERPL DL<=0.05 MIU/L-ACNC: 0.88 UIU/ML (ref 0.27–4.2)

## 2022-06-20 DIAGNOSIS — N18.32 STAGE 3B CHRONIC KIDNEY DISEASE (HCC): Primary | ICD-10-CM

## 2022-06-27 ENCOUNTER — CARE COORDINATION (OUTPATIENT)
Dept: CARE COORDINATION | Age: 87
End: 2022-06-27

## 2022-07-08 ENCOUNTER — CARE COORDINATION (OUTPATIENT)
Dept: CARE COORDINATION | Age: 87
End: 2022-07-08

## 2022-07-08 NOTE — CARE COORDINATION
[facility] received missed call and voicemail when this [facility] was off 6/28, that did not come through until today. Pt left message calling regarding her case. [facility] returned call to pt, pt reports she heard something about a $50/benefit through her insurance but has not heard any more about it. [facility] explained this is something through her [place] which her dtr Curlene Mcardle discussed with Anna Parada. [facility] offered to make 3-way call to Ashley Cadena , but pt declined offer. Pt asked this [facility] to have Nyla Horn call pt's dtr Curlene Mcardle and Curlene Mcardle will update pt of information. Pt denied any other questions or needs at this time. [facility] made call to Niko Duran, unable to reach, left message infroming of pt's request to call pt's dtr regarding benefits and [facility] requesting call back to this [facility] with contact info provided. [facility] received call back from Ashley Cadena reports she will call pt's dtr Curlene Mcardle.     Rolan ROTHMAN, [facility]   Care Coordinator  [phone]

## 2022-07-18 ENCOUNTER — CARE COORDINATION (OUTPATIENT)
Dept: CARE COORDINATION | Age: 87
End: 2022-07-18

## 2022-07-21 ENCOUNTER — OFFICE VISIT (OUTPATIENT)
Dept: PRIMARY CARE CLINIC | Age: 87
End: 2022-07-21
Payer: MEDICARE

## 2022-07-21 VITALS
SYSTOLIC BLOOD PRESSURE: 136 MMHG | TEMPERATURE: 98.1 F | DIASTOLIC BLOOD PRESSURE: 72 MMHG | OXYGEN SATURATION: 96 % | WEIGHT: 173 LBS | BODY MASS INDEX: 32.66 KG/M2 | HEART RATE: 107 BPM | HEIGHT: 61 IN

## 2022-07-21 DIAGNOSIS — Z79.4 TYPE 2 DIABETES MELLITUS WITH HYPERGLYCEMIA, WITH LONG-TERM CURRENT USE OF INSULIN (HCC): ICD-10-CM

## 2022-07-21 DIAGNOSIS — R13.19 ESOPHAGEAL DYSPHAGIA: ICD-10-CM

## 2022-07-21 DIAGNOSIS — I10 ESSENTIAL HYPERTENSION: Primary | ICD-10-CM

## 2022-07-21 DIAGNOSIS — N32.81 OVERACTIVE BLADDER: ICD-10-CM

## 2022-07-21 DIAGNOSIS — E11.65 TYPE 2 DIABETES MELLITUS WITH HYPERGLYCEMIA, WITH LONG-TERM CURRENT USE OF INSULIN (HCC): ICD-10-CM

## 2022-07-21 DIAGNOSIS — N18.32 STAGE 3B CHRONIC KIDNEY DISEASE (HCC): ICD-10-CM

## 2022-07-21 DIAGNOSIS — E03.9 ACQUIRED HYPOTHYROIDISM: ICD-10-CM

## 2022-07-21 DIAGNOSIS — G89.4 CHRONIC PAIN SYNDROME: ICD-10-CM

## 2022-07-21 DIAGNOSIS — G62.9 NEUROPATHY: ICD-10-CM

## 2022-07-21 DIAGNOSIS — K31.84 GASTROPARESIS: ICD-10-CM

## 2022-07-21 DIAGNOSIS — E78.2 MIXED HYPERLIPIDEMIA: ICD-10-CM

## 2022-07-21 PROCEDURE — 3044F HG A1C LEVEL LT 7.0%: CPT | Performed by: INTERNAL MEDICINE

## 2022-07-21 PROCEDURE — 1123F ACP DISCUSS/DSCN MKR DOCD: CPT | Performed by: INTERNAL MEDICINE

## 2022-07-21 PROCEDURE — 99214 OFFICE O/P EST MOD 30 MIN: CPT | Performed by: INTERNAL MEDICINE

## 2022-07-21 RX ORDER — HYDROCODONE BITARTRATE AND ACETAMINOPHEN 5; 325 MG/1; MG/1
1 TABLET ORAL 2 TIMES DAILY PRN
Qty: 60 TABLET | Refills: 0 | Status: SHIPPED | OUTPATIENT
Start: 2022-07-21 | End: 2022-10-20 | Stop reason: SDUPTHER

## 2022-07-21 ASSESSMENT — ENCOUNTER SYMPTOMS
EYE PAIN: 0
CHEST TIGHTNESS: 0
EYE ITCHING: 0
APNEA: 0
SINUS PRESSURE: 0
ABDOMINAL DISTENTION: 0
NAUSEA: 0
COUGH: 0
EYE REDNESS: 0
EYE DISCHARGE: 0
CONSTIPATION: 0
SHORTNESS OF BREATH: 0
VOMITING: 0
ABDOMINAL PAIN: 1
BACK PAIN: 1
SINUS PAIN: 0
WHEEZING: 0

## 2022-07-21 NOTE — PROGRESS NOTES
2022    Name: Shekhar Cage : 1935 Sex: female  Age: 81 y.o. Subjective:  Chief Complaint   Patient presents with    Hypertension        Hypertension  Pertinent negatives include no chest pain, neck pain, palpitations or shortness of breath. .    She has a history of type 2 diabetes mellitus and takes her glimepiride most of the time but sometimes if her blood sugar is low she will not take it. She is also on Lantus 12 units daily. Blood sugars have been anywhere from 100-200 but when she gets sick it went higher than that. Hemoglobin A1c was 6.5%. Dez Travis History of hypothyroidism on levothyroxine 50 mcg a day. Her TSH was low so we cut back her levothyroxine 50 mcg to 5 days a week. We will need to recheck her TSH and free T4. History of gastritis and GERD on Prevacid 30 mg a day    History of diabetic neuropathy on gabapentin 300 mg a day. She still has problems and would like to increase it to 300 mg twice daily. We are going to send in a new prescription. History of hyperlipidemia on ezetimibe 10 mg a day. In 2022  Total cholesterol 217, LDL cholesterol 127, HDL cholesterol 59 and triglycerides 154. She is intolerant to statins. History of hypertension on amlodipine 10 mg a day. Blood pressures are acceptable at home. She has a history of chronic kidney disease and her last creatinine was 1.6 BUN 26  Estimated GFR was 31. This is a decrease from previous so she was referred to Dr. Damion Jonas. She has an appointment to see me tomorrow. Reviewed her lab work and we will send it to him. Await his recommendations. She is little bit of cough and sinus congestion but this is from her usual sinus drainage and I do not feel that she has COVID-19. Dr. Orlin Walsh office was called and informed about this. Her white blood cell count in 2022 was 21,600 however the day after her blood work was done she came down with a severe viral gastroenteritis.   Her checked a home Covid test on her which was negative but the patient had severe diarrhea and abdominal pain for almost a week. This has since subsided she was unable to get her stool studies done. We will recheck her white blood cell count. This came down to 11,500 which is close enough to normal.  Hemoglobin is 11.8 hematocrit 38.6 with microcytic hypochromic indices she has a history of iron deficiency anemia    History of severe back pain with radiculitis, history of herniated lumbar disc and radiculopathy. She is on gabapentin which we increased to 300 mg 2 times a day and Norco 5/325 1 a day as needed. OARRS report reviewed and she last received Norco on 5/20/2022 #60. Prescription management done. .    X-rays of her cervical spine and a CT scan of her lumbar spine  Were done last year. 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 She has degenerative disc disease and spinal stenosis of her lumbar spine with a herniated disc L4/L5 impressing on the left L5 nerve root which is where she has the most pain on the left side . She saw Dr. Arline Serna earlier this year and he x-rayed her shoulder. Reviewed report. He also gave her a shot which did help somewhat. She said increased pain in her right shoulder now is having pain in her left shoulder. He ended up giving her steroid shots in both shoulders. She complains of numbness and burning of her feet. She also has leg pain worse on the left. I wonder if she has underlying diabetic neuropathy as well as her left lumbar radiculopathy. I discussed obtaining EMG studies on her but patient refuses to do this she says she will think about it    History of urinary urgency and frequency. Saw urologist who said nothing could be done. Then saw surgeon in [facility] Dr. Erna Larios who did surgery on her but unfortunately this did not work very well. She said Myrbetriq worked for a while so she is willing to try that again.   We will give her a prescription. History of gastroparesis. Saw initially . GI  in [place] and was referred to Dr. Sandoval Hernandez in [place] who said that she was too old for surgery. She saw a nurse practitioner at Dr. Chinyere Garcia office. They said not much else can be done for her at this time. She has a history of dysphagia. She says something was stuck in her throat when she swallows. I recommended a video barium swallow which she has refused. She also refuses to see GI at this time she says she will wait until she gets her kidney problem taken care of    She would like to see Dr. Leonel Maria who did her cataract surgery. She is having trouble seeing numbers. She  had her Covid vaccines along with her booster shot. She had her flu shot. She had her Shingrix vaccines, Tdap and Pneumovax    Review of Systems   Constitutional:  Positive for fatigue. Negative for fever. HENT:  Negative for congestion, ear pain, postnasal drip, sinus pressure and sinus pain. Eyes:  Positive for visual disturbance. Negative for pain, discharge, redness and itching. Has trouble reading numbers   Respiratory:  Negative for apnea, cough, chest tightness, shortness of breath and wheezing. Cardiovascular:  Negative for chest pain, palpitations and leg swelling. Gastrointestinal:  Positive for abdominal pain. Negative for abdominal distention, constipation, nausea and vomiting. Endocrine: Negative for cold intolerance, heat intolerance and polyuria. Genitourinary:  Positive for frequency and urgency. Negative for difficulty urinating, dysuria, flank pain and pelvic pain. Musculoskeletal:  Positive for arthralgias and back pain. Negative for gait problem, joint swelling, myalgias, neck pain and neck stiffness. Neurological:  Negative for dizziness, tremors, seizures and syncope. Psychiatric/Behavioral:  Negative for agitation, confusion and sleep disturbance.          Current Outpatient Medications:     mirabegron (MYRBETRIQ) 25 MG TB24, Take 1 tablet by mouth in the morning., Disp: 30 tablet, Rfl: 3    HYDROcodone-acetaminophen (NORCO) 5-325 MG per tablet, Take 1 tablet by mouth 2 times daily as needed for Pain for up to 30 days. , Disp: 60 tablet, Rfl: 0    glimepiride (AMARYL) 1 MG tablet, Take 1 tablet by mouth 2 times daily, Disp: 180 tablet, Rfl: 3    gabapentin (NEURONTIN) 300 MG capsule, Take 1 capsule by mouth 2 times daily for 90 days. , Disp: 180 capsule, Rfl: 1    blood glucose test strips (ONETOUCH VERIO) strip, TEST 2 TIMES DAILY AS NEEDED (SYMPTOMS OF LOW BLOOD SUGAR) AS INSTRUCTED, Disp: 100 each, Rfl: 5    insulin glargine (LANTUS SOLOSTAR) 100 UNIT/ML injection pen, Inject 12 Units into the skin daily, Disp: 5 pen, Rfl: 3    Lancets 28G MISC, Test blood sugar twice daily and as needed for symptoms of low blood sugar., Disp: 100 each, Rfl: 5    diclofenac sodium (VOLTAREN) 1 % GEL, Apply 2 g topically 4 times daily, Disp: 150 g, Rfl: 3    conjugated estrogens (PREMARIN) 0.625 MG/GM vaginal cream, Place vaginally daily, Disp: , Rfl:     lansoprazole (PREVACID) 30 MG delayed release capsule, Take 1 capsule by mouth daily, Disp: 90 capsule, Rfl: 3    levothyroxine (SYNTHROID) 50 MCG tablet, Take 1 tablet by mouth Daily Monday to Friday (not Saturday or Sunday), Disp: 90 tablet, Rfl: 3    PENTIPS 31G X 8 MM MISC, USE AS DIRECTED DAILY, Disp: 100 each, Rfl: 2    CALCIUM-MAGNESIUM-ZINC PO, Take 1 tablet by mouth in the morning and at bedtime , Disp: , Rfl:     amLODIPine (NORVASC) 10 MG tablet, Take 1 tablet by mouth daily, Disp: 90 tablet, Rfl: 3    ezetimibe (ZETIA) 10 MG tablet, Take 1 tablet by mouth daily, Disp: 90 tablet, Rfl: 3    Handicap Placard MISC, by Does not apply route Duration 5 years, Disp: 2 each, Rfl: 0    Ascorbic Acid (UNRULY-C PO), Take by mouth daily, Disp: , Rfl:     vitamin D (CHOLECALCIFEROL) 25 MCG (1000 UT) TABS tablet, Take 1,000 Units by mouth daily, Disp: , Rfl:     Calcium Ascorbate 500 MG TABS, Take 500 mg by mouth daily , Disp: , Rfl:     Blood Glucose Monitoring Suppl (BLOOD GLUCOSE MONITOR SYSTEM) w/Device KIT, Test blood sugar twice daily, and as needed for symptoms of low blood sugar. (Patient is insulin dependant.), Disp: 1 kit, Rfl: 0     Allergies   Allergen Reactions    Hydrocodone-Acetaminophen      Other reaction(s): Unknown    Ibuprofen     Lisinopril      Other reaction(s): Cough    Nsaids Other (See Comments)    Pregabalin Swelling    Simvastatin     Statins Other (See Comments)     Muscle weakness         Past Medical History:   Diagnosis Date    Anemia     Chronic kidney disease     Stage III    Chronic pain     Cirrhosis ([facility] [address]     Depression     Fibromyalgia     Gastroparesis     Pernicious anemia     Thyroid nodule        There are no preventive care reminders to display for this patient.        Patient Active Problem List   Diagnosis    Essential hypertension    Slow transit constipation    Type 2 diabetes mellitus (HCC)    Gastroparesis    Chronic pain    Fibromyalgia    Thyroid nodule    Pernicious anemia    Hypothyroidism    Abnormal liver enzymes    Bladder prolapse, female, acquired    Urge incontinence    Fatty liver    Depression    Mixed hyperlipidemia    Chronic kidney disease    Dysuria    Overactive bladder    Herniated lumbar intervertebral disc    Left lumbar radiculopathy    Gastroesophageal reflux disease    Muscle spasm    Chronic pain of both shoulders    Vitamin B12 deficiency    Cervical radiculitis    Vitamin D deficiency    Need for shingles vaccine    Neuropathy    Visual problems    Iron deficiency    Diarrhea of presumed infectious origin    Primary osteoarthritis    Leucocytosis    Esophageal dysphagia        Past Surgical History:   Procedure Laterality Date    APPENDECTOMY      CHOLECYSTECTOMY      EYE SURGERY      cataract OU    HEMORRHOID SURGERY      HYSTERECTOMY, TOTAL ABDOMINAL (CERVIX REMOVED)      JOINT REPLACEMENT Left     Knee     KNEE ARTHROPLASTY Right SUBTOTAL COLECTOMY          Family History   Problem Relation Age of Onset    Other Mother         cva    High Blood Pressure Mother     Other Father         heart disease         Social History     Tobacco Use    Smoking status: Never    Smokeless tobacco: Never   Substance Use Topics    Alcohol use: Not Currently    Drug use: Not Currently     Comment: Norco 5/325        Objective  Vitals:    07/21/22 1431   BP: 136/72   Pulse: (!) 107   Temp: 98.1 °F (36.7 °C)   SpO2: 96%   Weight: 173 lb (78.5 kg)   Height: 5' 1\" (1.549 m)        Exam:  Physical Exam  Vitals reviewed. Constitutional:       General: She is not in acute distress. Appearance: Normal appearance. She is obese. She is not ill-appearing. HENT:      Head: Normocephalic and atraumatic. Right Ear: External ear normal.      Left Ear: External ear normal.   Eyes:      General: No scleral icterus. Right eye: No discharge. Left eye: No discharge. Conjunctiva/sclera: Conjunctivae normal.   Neck:      Comments: Tender to palpation along the cervical spine  Cardiovascular:      Rate and Rhythm: Normal rate and regular rhythm. Pulses: Normal pulses. Heart sounds: Normal heart sounds. No murmur heard. Pulmonary:      Effort: Pulmonary effort is normal. No respiratory distress. Breath sounds: No rales. Abdominal:      General: Bowel sounds are normal. There is no distension. Palpations: Abdomen is soft. There is no mass. Tenderness: There is no abdominal tenderness. Musculoskeletal:         General: Tenderness present. Normal range of motion. Cervical back: Normal range of motion and neck supple. No rigidity. No muscular tenderness. Comments: Tenderness to palpation of both shoulders, tenderness on palpation of her lower lumbar spine. Decreased range of motion to internal and external rotation of both shoulders, slightly decreased flexion of her dorsal spine.    Lymphadenopathy:      Cervical: No cervical adenopathy. Skin:     General: Skin is warm and dry. Coloration: Skin is pale. Skin is not jaundiced. Neurological:      Mental Status: She is alert and oriented to person, place, and time. Motor: No weakness. Gait: Gait normal.      Comments: Decreased sensation to light touch from the mid calf down bilaterally. Psychiatric:         Mood and Affect: Mood normal.         Behavior: Behavior normal.         Thought Content: Thought content normal.         Judgment: Judgment normal.        Last labs reviewed. ASSESSMENT & PLAN :   Problem List          Circulatory    Essential hypertension - Primary     Blood pressures are stable. Continue medications and monitor blood pressures at home. Call office if systolics are over 831 over diastolics over 90. Relevant Medications    amLODIPine (NORVASC) 10 MG tablet       Digestive    Esophageal dysphagia       wants to wait for a while till she gets barium swallow         Gastroparesis       patient refuses GI evaluation at this time            Endocrine    [facility] mellitus [facility]       watch her diet, continue glimepiride, continue her insulin 12 units subcu daily, monitor her blood sugars.          Relevant Medications    Blood Glucose Monitoring Suppl (BLOOD GLUCOSE MONITOR SYSTEM) w/Device KIT    PENTIPS 31G X 8 MM MISC    glimepiride (AMARYL) 1 MG tablet    blood glucose test strips (ONETOUCH VERIO) strip    insulin glargine (LANTUS SOLOSTAR) 100 UNIT/ML injection pen    Lancets 28G MISC    Hypothyroidism       continue levothyroxine, last TSH was normal         Relevant Medications    levothyroxine (SYNTHROID) 50 MCG tablet       Nervous and Auditory    Neuropathy       continue gabapentin and Norco.  Prescription management         Relevant Medications    gabapentin (NEURONTIN) 300 MG capsule       Genitourinary    Chronic kidney disease       stage IIIb, continue medications, avoid NSAID's, referred to Dr. Sahra Hill and will await his recommendations         Overactive bladder       see urology         Relevant Medications    mirabegron (MYRBETRIQ) 25 MG TB24       Other    Chronic pain       OARRS report reviewed and Norco prescription written         Relevant Medications    gabapentin (NEURONTIN) 300 MG capsule    HYDROcodone-acetaminophen (NORCO) 5-325 MG per tablet    Mixed hyperlipidemia    Relevant Medications    amLODIPine (NORVASC) 10 MG tablet    ezetimibe (ZETIA) 10 MG tablet        Return in about 3 months (around 10/21/2022), or DM, HTN.        Christina Lopez DO  7/21/2022

## 2022-07-21 NOTE — ASSESSMENT & PLAN NOTE
Blood pressures are stable. Continue medications and monitor blood pressures at home. Call office if systolics are over 930 over diastolics over 90.

## 2022-07-21 NOTE — ASSESSMENT & PLAN NOTE
stage IIIb, continue medications, avoid NSAID's, referred to Dr. Ashley Layne and will await his recommendations

## 2022-07-21 NOTE — ASSESSMENT & PLAN NOTE
watch her diet, continue glimepiride, continue her insulin 12 units subcu daily, monitor her blood sugars.

## 2022-07-21 NOTE — PATIENT INSTRUCTIONS
Call Dr Esparza Wisner office  OK to see Dr Enedina Willis, no signs or symptoms of Covid 19- Has appointment tomorrow  Fax June 2022 bloodwork to his office

## 2022-07-26 DIAGNOSIS — G62.9 NEUROPATHY: ICD-10-CM

## 2022-07-26 RX ORDER — GABAPENTIN 300 MG/1
CAPSULE ORAL
Qty: 180 CAPSULE | Refills: 1 | Status: SHIPPED
Start: 2022-07-26 | End: 2022-09-20 | Stop reason: SDUPTHER

## 2022-07-28 ENCOUNTER — CARE COORDINATION (OUTPATIENT)
Dept: CARE COORDINATION | Age: 87
End: 2022-07-28

## 2022-07-28 NOTE — CARE COORDINATION
[facility] made f/u call to pt, pt reports she has not spoken with Brent Hurst. [facility] offered to make 3way call to Carolyn Boast, pt agreeable. Made call to Carolyn Boast, left message requesting call to pt with contact info provided and also left this [facility] contact info provided. No other questions or needs reported by pt.     Sabi ROTHMAN, [place]   Care Coordinator  [phone]

## 2022-08-09 ENCOUNTER — CARE COORDINATION (OUTPATIENT)
Dept: CARE COORDINATION | Age: 87
End: 2022-08-09

## 2022-08-09 NOTE — CARE COORDINATION
[facility] received incoming call from pt asking for FLORES Bhatia. Pt states she wants to talk with her regarding a food card she received. Reports that a call was placed to [facility] [employer] and pt hasn't heard back from Diego [employer]. AC offered to place a 3 way call to [employer] Junior, pt declined and is asking for a call from [facility].  [facility] will notify FLORES Bhatia.

## 2022-08-11 ENCOUNTER — CARE COORDINATION (OUTPATIENT)
Dept: CARE COORDINATION | Age: 87
End: 2022-08-11

## 2022-08-11 NOTE — CARE COORDINATION
[facility] received message from [facility] that pt was trying to reach this [facility] regarding her food card from insurance. [facility] placed call to Loretta Ivy who reports she has not been able to reach pt and was agreeable to a 3-way call to pt. [facility] made 3-way call to pt, unable to reach, left message requesting call back to this [facility] with contact info provided and also left Amanda PINEDA's contact info.     Audrey ROTHMAN, [place]   Care Coordinator  [phone]

## 2022-08-18 ENCOUNTER — CARE COORDINATION (OUTPATIENT)
Dept: CARE COORDINATION | Age: 87
End: 2022-08-18

## 2022-08-18 NOTE — CARE COORDINATION
[facility] made call to pt, unable to reach, left message requesting call back to this [facility] and also provided contact info for [facility] contact Benji Layne [phone] d/t pt having questions in the past.

## 2022-08-29 ENCOUNTER — CARE COORDINATION (OUTPATIENT)
Dept: CARE COORDINATION | Age: 87
End: 2022-08-29

## 2022-08-29 NOTE — CARE COORDINATION
[facility] made f/u call to pt, pt reports she had a fall a couple of weeks ago and is recovering. [facility] asked if pt made PCP office aware, pt reports she has not. Pt declined offer to make 3way call to PCP to notify. Pt reports she does not feel she needs to go to PCP office and would call PCP if she felt need to. Pt has PCP office phone number and understands to call [phone] with medical emergency. Pt would like to speak with Opal d/t not using her food card yet and does not want it. [facility] made 3way call to Geronimo Vela. Carolyn Boast explained to pt pt can use the card to get any food items. Pt will think about it, and explained she has food assistance so pt does not feel she needs it. Pt asked Carolyn Boast with [facility] what orthopedic doctors are in network, Carolyn Boast to email list to this [facility] and [facility] will mail out list to pt. Pt denied any other questions or needs. [facility] to route note to PCP and ELIJAH Ramirez to inform of pt's fall a couple of weeks ago. [facility] to f/u.     Sabi ROTHMAN, [facility]   Care Coordinator  [phone]

## 2022-08-30 ENCOUNTER — CARE COORDINATION (OUTPATIENT)
Dept: CARE COORDINATION | Age: 87
End: 2022-08-30

## 2022-09-06 ENCOUNTER — CARE COORDINATION (OUTPATIENT)
Dept: CARE COORDINATION | Age: 87
End: 2022-09-06

## 2022-09-06 NOTE — CARE COORDINATION
[facility] made f/u call to pt, pt reports she has not yet received list of in network Orthopedic Dr's that Opal sent this [facility]. Pt denied any other questions or needs. [facility] confirmed with Magui Hendrickson that it was sent out Thursday 9/1. [facility] to f/u.     Miesha ROTHMAN, [facility]   Care Coordinator  [phone]

## 2022-09-09 ENCOUNTER — TELEPHONE (OUTPATIENT)
Dept: FAMILY MEDICINE CLINIC | Age: 87
End: 2022-09-09

## 2022-09-09 DIAGNOSIS — Z79.4 TYPE 2 DIABETES MELLITUS WITH HYPERGLYCEMIA, WITH LONG-TERM CURRENT USE OF INSULIN (HCC): ICD-10-CM

## 2022-09-09 DIAGNOSIS — E78.2 MIXED HYPERLIPIDEMIA: ICD-10-CM

## 2022-09-09 DIAGNOSIS — E11.65 TYPE 2 DIABETES MELLITUS WITH HYPERGLYCEMIA, WITH LONG-TERM CURRENT USE OF INSULIN (HCC): ICD-10-CM

## 2022-09-09 DIAGNOSIS — E03.9 HYPOTHYROIDISM, UNSPECIFIED TYPE: ICD-10-CM

## 2022-09-09 DIAGNOSIS — I10 ESSENTIAL HYPERTENSION: ICD-10-CM

## 2022-09-09 RX ORDER — LEVOTHYROXINE SODIUM 0.05 MG/1
TABLET ORAL
Qty: 90 TABLET | Refills: 3 | Status: SHIPPED | OUTPATIENT
Start: 2022-09-09

## 2022-09-09 RX ORDER — GLIMEPIRIDE 1 MG/1
1 TABLET ORAL 2 TIMES DAILY
Qty: 180 TABLET | Refills: 3 | Status: SHIPPED
Start: 2022-09-09 | End: 2022-10-26

## 2022-09-09 RX ORDER — AMLODIPINE BESYLATE 10 MG/1
TABLET ORAL
Qty: 90 TABLET | Refills: 3 | Status: SHIPPED | OUTPATIENT
Start: 2022-09-09

## 2022-09-09 RX ORDER — EZETIMIBE 10 MG/1
TABLET ORAL
Qty: 90 TABLET | Refills: 3 | Status: SHIPPED | OUTPATIENT
Start: 2022-09-09

## 2022-09-09 NOTE — TELEPHONE ENCOUNTER
Jb Ordoñez called in stated that she is very upset and has been trying to contact the [place] location and now she is out of her medication. She would like someone to go over her medication list and send in all needed prescriptions to [facility] in [place]. She stated that she takes her medication bottles to every appointment with her and the medications she is completely out of refills include: levothyroxine 50mcg tablet, ezetimibe 10 mg tablet, amLODIPine 50 mcg tablet, glimepriride 1mg tablet. She also stated that she has more medications that the refill  next week. She stated that she would really appreciate someone taking care of this for her.         [phone].

## 2022-09-14 ENCOUNTER — CARE COORDINATION (OUTPATIENT)
Dept: CARE COORDINATION | Age: 87
End: 2022-09-14

## 2022-09-14 NOTE — CARE COORDINATION
[facility] made f/u call to pt, to see if pt received list of in network Orthopedic Dr's that Op[facility] sent this [facility], unable to reach pt, left message requesting call back.     Virginia ROTHMAN, [place]   Care Coordinator  [phone]

## 2022-09-20 DIAGNOSIS — N32.81 OVERACTIVE BLADDER: ICD-10-CM

## 2022-09-20 DIAGNOSIS — G62.9 NEUROPATHY: ICD-10-CM

## 2022-09-20 RX ORDER — CONJUGATED ESTROGENS 0.62 MG/G
CREAM VAGINAL DAILY
Qty: 1 EACH | Refills: 2 | Status: SHIPPED | OUTPATIENT
Start: 2022-09-20

## 2022-09-20 RX ORDER — GABAPENTIN 300 MG/1
CAPSULE ORAL
Qty: 180 CAPSULE | Refills: 1 | Status: SHIPPED
Start: 2022-09-20 | End: 2022-10-26 | Stop reason: SDUPTHER

## 2022-09-20 NOTE — TELEPHONE ENCOUNTER
Pt would like her prescriptions    Gabapentin  300 mg 90 days supply      Myrbetrio 25 mg 30 day supply    PREMARIN 0.625 mg/gm vaginal cream    [facility] [place]    Thank you

## 2022-09-21 DIAGNOSIS — K21.9 GASTROESOPHAGEAL REFLUX DISEASE, UNSPECIFIED WHETHER ESOPHAGITIS PRESENT: ICD-10-CM

## 2022-09-21 RX ORDER — LANSOPRAZOLE 30 MG/1
30 CAPSULE, DELAYED RELEASE ORAL DAILY
Qty: 90 CAPSULE | Refills: 3 | Status: SHIPPED | OUTPATIENT
Start: 2022-09-21

## 2022-09-21 NOTE — TELEPHONE ENCOUNTER
Last Appointment:  7/21/2022  Future Appointments   Date Time Provider Gerry Osorio   10/20/2022  2:00 PM Claudio Carpenter

## 2022-09-22 ENCOUNTER — CARE COORDINATION (OUTPATIENT)
Dept: CARE COORDINATION | Age: 87
End: 2022-09-22

## 2022-09-22 NOTE — CARE COORDINATION
[facility] made f/u call pt, unable to reach, left message requesting call back to this [facility] with contact info provided.     Ron ROTHMAN, [place]   Care Coordinator  [phone]

## 2022-09-30 ENCOUNTER — CARE COORDINATION (OUTPATIENT)
Dept: CARE COORDINATION | Age: 87
End: 2022-09-30

## 2022-09-30 NOTE — CARE COORDINATION
[facility] made f/u call to pt, pt reports she received list of orthopedic dr's in network from [facility]. Pt reports she has not yet looked into it yet. Pt asked about her food card, reports she plans to use it today at [place]. No other questions or needs, [facility] to sign off. Pt has this [facility] number to call with any SW needs that may arise.     Aretha ROTHMAN, [facility]   Care Coordinator  [phone]

## 2022-10-20 DIAGNOSIS — G89.4 CHRONIC PAIN SYNDROME: ICD-10-CM

## 2022-10-20 RX ORDER — HYDROCODONE BITARTRATE AND ACETAMINOPHEN 5; 325 MG/1; MG/1
1 TABLET ORAL 2 TIMES DAILY PRN
Qty: 60 TABLET | Refills: 0 | Status: SHIPPED | OUTPATIENT
Start: 2022-10-20 | End: 2022-11-19

## 2022-10-20 NOTE — TELEPHONE ENCOUNTER
Name of Medication(s) Requested:  Skytop    Pharmacy Requested:   [facility]    Medication(s) pended? [x] Yes  [] No    Last Appointment:  7/21/2022    Future appts:  No future appointments. Does patient need call back?   [] Yes  [x] No

## 2022-10-26 ENCOUNTER — OFFICE VISIT (OUTPATIENT)
Dept: PRIMARY CARE CLINIC | Age: 87
End: 2022-10-26
Payer: MEDICARE

## 2022-10-26 VITALS
WEIGHT: 170 LBS | HEIGHT: 61 IN | OXYGEN SATURATION: 96 % | BODY MASS INDEX: 32.1 KG/M2 | HEART RATE: 93 BPM | TEMPERATURE: 98 F | DIASTOLIC BLOOD PRESSURE: 70 MMHG | SYSTOLIC BLOOD PRESSURE: 132 MMHG

## 2022-10-26 DIAGNOSIS — G62.9 NEUROPATHY: ICD-10-CM

## 2022-10-26 DIAGNOSIS — Z12.31 ENCOUNTER FOR SCREENING MAMMOGRAM FOR BREAST CANCER: ICD-10-CM

## 2022-10-26 DIAGNOSIS — E03.9 ACQUIRED HYPOTHYROIDISM: ICD-10-CM

## 2022-10-26 DIAGNOSIS — N18.32 STAGE 3B CHRONIC KIDNEY DISEASE (HCC): ICD-10-CM

## 2022-10-26 DIAGNOSIS — G89.4 CHRONIC PAIN SYNDROME: ICD-10-CM

## 2022-10-26 DIAGNOSIS — E11.65 TYPE 2 DIABETES MELLITUS WITH HYPERGLYCEMIA, WITH LONG-TERM CURRENT USE OF INSULIN (HCC): Primary | ICD-10-CM

## 2022-10-26 DIAGNOSIS — Z79.4 TYPE 2 DIABETES MELLITUS WITH HYPERGLYCEMIA, WITH LONG-TERM CURRENT USE OF INSULIN (HCC): Primary | ICD-10-CM

## 2022-10-26 DIAGNOSIS — Z23 NEED FOR INFLUENZA VACCINATION: ICD-10-CM

## 2022-10-26 LAB — HBA1C MFR BLD: 6.7 %

## 2022-10-26 PROCEDURE — G0008 ADMIN INFLUENZA VIRUS VAC: HCPCS | Performed by: INTERNAL MEDICINE

## 2022-10-26 PROCEDURE — 90694 VACC AIIV4 NO PRSRV 0.5ML IM: CPT | Performed by: INTERNAL MEDICINE

## 2022-10-26 PROCEDURE — 1123F ACP DISCUSS/DSCN MKR DOCD: CPT | Performed by: INTERNAL MEDICINE

## 2022-10-26 PROCEDURE — 83036 HEMOGLOBIN GLYCOSYLATED A1C: CPT | Performed by: INTERNAL MEDICINE

## 2022-10-26 PROCEDURE — 3044F HG A1C LEVEL LT 7.0%: CPT | Performed by: INTERNAL MEDICINE

## 2022-10-26 PROCEDURE — 99214 OFFICE O/P EST MOD 30 MIN: CPT | Performed by: INTERNAL MEDICINE

## 2022-10-26 RX ORDER — GABAPENTIN 300 MG/1
CAPSULE ORAL
Qty: 180 CAPSULE | Refills: 1 | Status: SHIPPED | OUTPATIENT
Start: 2022-10-26 | End: 2023-01-25

## 2022-10-26 ASSESSMENT — ENCOUNTER SYMPTOMS
EYE DISCHARGE: 0
COUGH: 0
WHEEZING: 0
EYE ITCHING: 0
EYE PAIN: 0
APNEA: 0
CHEST TIGHTNESS: 0
SINUS PRESSURE: 0
BACK PAIN: 1
SINUS PAIN: 0
SHORTNESS OF BREATH: 0
CONSTIPATION: 0
EYE REDNESS: 0
ABDOMINAL DISTENTION: 0
VOMITING: 0
NAUSEA: 0
ABDOMINAL PAIN: 1

## 2022-10-26 NOTE — ASSESSMENT & PLAN NOTE
she was given a prescription for Norco on 10/20/2022. She tries not to take this on a daily basis. With the discontinuation of her NSAID gel she may increase the number of times she takes this. We will wait and see if she runs out of the medication before her next office visit. OARRS report reviewed and her use is appropriate.   Chronic checkpoints reviewed with patient

## 2022-10-26 NOTE — ASSESSMENT & PLAN NOTE
fasting blood sugar still too high at 147 so not at goal however her A1c is 6.9% so it is not too bad. She will continue her Lantus 12 units at bedtime. We will discontinue her glimepiride and start her on Farxiga 5 mg every morning which should help her blood sugars and her kidney status. Patient was warned about perineal infections, let me know if she has any problems with this, told to increase her hydration so she does not get dehydrated. She will continue to monitor her blood sugars as before and continue with her diet.

## 2022-10-26 NOTE — ASSESSMENT & PLAN NOTE
last mammogram was in 2020.   She wants to get a mammogram this year so we will give her a requisition for screening mammogram to be done at [facility]

## 2022-10-26 NOTE — ASSESSMENT & PLAN NOTE
I wonder she does not have lumbar radiculopathy plus peripheral neuropathy from diabetes. Patient has been reluctant to do any nerve conduction tests. At present her symptoms are being controlled with gabapentin 300 mg twice daily.   Prescription written

## 2022-10-26 NOTE — PROGRESS NOTES
10/26/2022    Name: Dena Styles : 1935 Sex: female  Age: 81 y.o. Subjective:  No chief complaint on file. Hypertension  Pertinent negatives include no chest pain, neck pain, palpitations or shortness of breath. .    She has a history of type 2 diabetes mellitus and takes her glimepiride most of the time but sometimes if her blood sugar is low she will not take it. She is also on Lantus 12 units daily. Blood sugars have been anywhere from 100-200 but when she gets sick it went higher than that. Hemoglobin A1c was 6.9%. In 2022 . Discussed with her switching from glimepiride to [place] which may  help her kidney functions  . Franco Walker She is willing to try this. She knows about the risk of severe gynecological infection such as UTIs or perineal infections. She also is aware that she needs to hydrate adequately in order to prevent dehydration. History of hypothyroidism on levothyroxine 50 mcg a day. TSH and free T4 in 2022 were normal so she stays on levothyroxine 50 mcg a day. Franco Walker History of gastritis and GERD on Prevacid 30 mg a day    History of diabetic neuropathy on gabapentin 300 mg twice a day. She is doing better on the increased dose of gabapentin. History of hyperlipidemia on ezetimibe 10 mg a day. Lipids had improved, her LDL is now 102. .  She is intolerant to statins. History of hypertension on amlodipine 10 mg a day. Blood pressures are acceptable at home. And here. She has a history of chronic kidney disease stage IIIb and her last creatinine was 1.3 BUN 36  Estimated GFR improved to 39 . she was referred to Dr. Marc Mcdaniels. Reviewed his report. He discontinued her Voltaren gel in view of her chronic kidney disease. He also talked about [place] with her. Blood work was ordered by him. Was done this morning and reviewed by me. .  Magnesium, phosphorus and uric acid were normal.  Kidney function is noted, fasting blood sugar was 147.   She was not anemic hemoglobin 12.2 hematocrit 37.6 however her indices were microcytic and hypochromic. Her white blood cell count was normal  She had increased protein/creatinine ratio in her urine. She has an appointment to see me in follow-up on November 11, 2022. History of severe back pain with radiculitis, history of herniated lumbar disc and radiculopathy. She is on gabapentin which we increased to 300 mg 2 times a day and Norco 5/325 1 a day as needed. OARRS report reviewed and she last received Norco on 10/20/2022 #60. Prescription management done. She says her arthritis pain has worsened since the Voltaren gel was discontinued. She has been trying other topical medications such as Aspercreme but they have not helped. She feels that she might need to increase her Norco if she cannot get any relief from topical medication  . X-rays of her cervical spine and a CT scan of her lumbar spine  Were done last year. 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 She has degenerative disc disease and spinal stenosis of her lumbar spine with a herniated disc L4/L5 impressing on the left L5 nerve root which is where she has the most pain on the left side . Gamal Tobin She complains of numbness and burning of her feet. She also has leg pain worse on the left. I wonder if she has underlying diabetic neuropathy as well as her left lumbar radiculopathy. I discussed obtaining EMG studies on her but patient refuses to do this she says she will think about it    History of urinary urgency and frequency. Saw urologist who said nothing could be done. Then saw surgeon in [place] Dr. Jo-Ann Davies who did surgery on her but unfortunately this did not work very well. She said Myrbetriq worked for a while so she is willing to try that again. We will give her a prescription. History of gastroparesis. Saw initially .  MARIELOS  in [place] and was referred to Dr. Kimberley Martinez in [place] who said that she was too old for surgery. She saw a nurse practitioner at Dr. Martha Kirby office. They said not much else can be done for her at this time. She has a history of dysphagia. She says something was stuck in her throat when she swallows. I recommended a video barium swallow which she has refused. She also refuses to see GI at this time she says she will wait until she gets her kidney problem taken care of she says that the dysphagia has improved and so she does not want any further studies done    She would like to see Dr. Bhavin Carpio who did her cataract surgery. She is having trouble seeing numbers. She  had her Covid vaccines along with her first booster shot. She has not had her second booster shot. She needs  her flu shot. She had her Shingrix vaccines, Tdap and Pneumovax    Review of Systems   Constitutional:  Positive for fatigue. Negative for fever. HENT:  Negative for congestion, ear pain, postnasal drip, sinus pressure and sinus pain. Eyes:  Positive for visual disturbance. Negative for pain, discharge, redness and itching. Has trouble reading numbers   Respiratory:  Negative for apnea, cough, chest tightness, shortness of breath and wheezing. Cardiovascular:  Negative for chest pain, palpitations and leg swelling. Gastrointestinal:  Positive for abdominal pain. Negative for abdominal distention, constipation, nausea and vomiting. Endocrine: Negative for cold intolerance, heat intolerance and polyuria. Genitourinary:  Positive for frequency and urgency. Negative for difficulty urinating, dysuria, flank pain and pelvic pain. Musculoskeletal:  Positive for arthralgias and back pain. Negative for gait problem, joint swelling, myalgias, neck pain and neck stiffness. Neurological:  Negative for dizziness, tremors, seizures and syncope. Psychiatric/Behavioral:  Negative for agitation, confusion and sleep disturbance.          Current Outpatient Medications:     gabapentin (NEURONTIN) 300 MG capsule, TAKE ONE CAPSULE BY MOUTH TWICE A DAY, Disp: 180 capsule, Rfl: 1    Insulin Pen Needle 31G X 8 MM MISC, 1 each by Does not apply route daily, Disp: 100 each, Rfl: 3    dapagliflozin (FARXIGA) 5 MG tablet, Take 1 tablet by mouth every morning, Disp: 30 tablet, Rfl: 5    HYDROcodone-acetaminophen (NORCO) 5-325 MG per tablet, Take 1 tablet by mouth 2 times daily as needed for Pain for up to 30 days. , Disp: 60 tablet, Rfl: 0    lansoprazole (PREVACID) 30 MG delayed release capsule, Take 1 capsule by mouth daily, Disp: 90 capsule, Rfl: 3    mirabegron (MYRBETRIQ) 25 MG TB24, Take 1 tablet by mouth daily, Disp: 30 tablet, Rfl: 3    conjugated estrogens (PREMARIN) 0.625 MG/GM vaginal cream, Place vaginally daily, Disp: 1 each, Rfl: 2    ezetimibe (ZETIA) 10 MG tablet, TAKE ONE TABLET BY MOUTH EVERY DAY, Disp: 90 tablet, Rfl: 3    levothyroxine (SYNTHROID) 50 MCG tablet, TAKE ONE TABLET BY MOUTH EVERY DAY MONDAY TO FRIDAY (NOT SATURDAY AND SUNDAY), Disp: 90 tablet, Rfl: 3    amLODIPine (NORVASC) 10 MG tablet, TAKE ONE TABLET BY MOUTH EVERY DAY, Disp: 90 tablet, Rfl: 3    blood glucose test strips (ONETOUCH VERIO) strip, TEST 2 TIMES DAILY AS NEEDED (SYMPTOMS OF LOW BLOOD SUGAR) AS INSTRUCTED, Disp: 100 each, Rfl: 5    insulin glargine (LANTUS SOLOSTAR) 100 UNIT/ML injection pen, Inject 12 Units into the skin daily, Disp: 5 pen, Rfl: 3    Lancets 28G MISC, Test blood sugar twice daily and as needed for symptoms of low blood sugar., Disp: 100 each, Rfl: 5    CALCIUM-MAGNESIUM-ZINC PO, Take 1 tablet by mouth in the morning and at bedtime , Disp: , Rfl:     Handicap Placard MISC, by Does not apply route Duration 5 years, Disp: 2 each, Rfl: 0    Ascorbic Acid (UNRULY-C PO), Take by mouth daily, Disp: , Rfl:     vitamin D (CHOLECALCIFEROL) 25 MCG (1000 UT) TABS tablet, Take 1,000 Units by mouth daily, Disp: , Rfl:     Calcium Ascorbate 500 MG TABS, Take 500 mg by mouth daily , Disp: , Rfl:     Blood Glucose Monitoring Suppl (BLOOD GLUCOSE MONITOR SYSTEM) w/Device KIT, Test blood sugar twice daily, and as needed for symptoms of low blood sugar. (Patient is insulin dependant.), Disp: 1 kit, Rfl: 0     Allergies   Allergen Reactions    Ibuprofen     Lisinopril      Other reaction(s): Cough    Nsaids Other (See Comments)    Pregabalin Swelling    Simvastatin     Statins Other (See Comments)     Muscle weakness         Past Medical History:   Diagnosis Date    Anemia     Chronic kidney disease     Stage III    Chronic pain     Cirrhosis (HCC)     Depression     Fibromyalgia     Gastroparesis     Pernicious anemia     Thyroid nodule        There are no preventive care reminders to display for this patient.          Patient Active Problem List   Diagnosis    Essential hypertension    Slow transit constipation    Type 2 diabetes mellitus (HCC)    Gastroparesis    Chronic pain    Fibromyalgia    Thyroid nodule    Pernicious anemia    Hypothyroidism    Abnormal liver enzymes    Bladder prolapse, female, acquired    Urge incontinence    Fatty liver    Depression    Mixed hyperlipidemia    Need for influenza vaccination    Chronic kidney disease    Dysuria    Overactive bladder    Herniated lumbar intervertebral disc    Left lumbar radiculopathy    Gastroesophageal reflux disease    Encounter for screening mammogram for breast cancer    Muscle spasm    Chronic pain of both shoulders    Vitamin B12 deficiency    Cervical radiculitis    Vitamin D deficiency    Need for shingles vaccine    Neuropathy    Visual problems    Iron deficiency    Diarrhea of presumed infectious origin    Primary osteoarthritis    Leucocytosis    Esophageal dysphagia        Past Surgical History:   Procedure Laterality Date    APPENDECTOMY      CHOLECYSTECTOMY      EYE SURGERY      cataract OU    HEMORRHOID SURGERY      HYSTERECTOMY, TOTAL ABDOMINAL (CERVIX REMOVED)      JOINT REPLACEMENT Left     Knee     KNEE ARTHROPLASTY Right     SUBTOTAL COLECTOMY          Family History Problem Relation Age of Onset    Other Mother         cva    High Blood Pressure Mother     Other Father         heart disease         Social History     Tobacco Use    Smoking status: Never    Smokeless tobacco: Never   Substance Use Topics    Alcohol use: Not Currently    Drug use: Not Currently     Comment: Norco 5/325        Objective  Vitals:    10/26/22 1529   BP: 132/70   Pulse: 93   Temp: 98 °F (36.7 °C)   SpO2: 96%   Weight: 170 lb (77.1 kg)   Height: 5' 1\" (1.549 m)        Exam:  Physical Exam  Vitals reviewed. Constitutional:       General: She is not in acute distress. Appearance: Normal appearance. She is obese. She is not ill-appearing. HENT:      Head: Normocephalic and atraumatic. Right Ear: External ear normal.      Left Ear: External ear normal.   Eyes:      General: No scleral icterus. Right eye: No discharge. Left eye: No discharge. Conjunctiva/sclera: Conjunctivae normal.   Neck:      Comments: Tender to palpation along the cervical spine  Cardiovascular:      Rate and Rhythm: Normal rate and regular rhythm. Pulses: Normal pulses. Heart sounds: Normal heart sounds. No murmur heard. Pulmonary:      Effort: Pulmonary effort is normal. No respiratory distress. Breath sounds: No rales. Abdominal:      General: Bowel sounds are normal. There is no distension. Palpations: Abdomen is soft. There is no mass. Tenderness: There is no abdominal tenderness. Musculoskeletal:         General: Tenderness present. Normal range of motion. Cervical back: Normal range of motion and neck supple. No rigidity. No muscular tenderness. Comments: Tenderness to palpation of both shoulders, tenderness on palpation of her lower lumbar spine. Decreased range of motion to internal and external rotation of both shoulders, slightly decreased flexion of her dorsal spine. Lymphadenopathy:      Cervical: No cervical adenopathy.    Skin:     General: Skin is warm and dry. Coloration: Skin is pale. Skin is not jaundiced. Neurological:      Mental Status: She is alert and oriented to person, place, and time. Motor: No weakness. Gait: Gait normal.      Comments: Decreased sensation to light touch from the mid calf down bilaterally. Psychiatric:         Mood and Affect: Mood normal.         Behavior: Behavior normal.         Thought Content: Thought content normal.         Judgment: Judgment normal.        Last labs reviewed. ASSESSMENT & PLAN :   Problem List          Endocrine    Type 2 diabetes mellitus ([facility] - Primary       fasting blood sugar still too high at 147 so not at goal however her A1c is 6.9% so it is not too bad. She will continue her Lantus 12 units at bedtime. We will discontinue her glimepiride and start her on Farxiga 5 mg every morning which should help her blood sugars and her kidney status. Patient was warned about perineal infections, let me know if she has any problems with this, told to increase her hydration so she does not get dehydrated. She will continue to monitor her blood sugars as before and continue with her diet. Relevant Medications    Blood Glucose Monitoring Suppl (BLOOD GLUCOSE MONITOR SYSTEM) w/Device KIT    blood glucose test strips (ONETOUCH VERIO) strip    insulin glargine (LANTUS SOLOSTAR) 100 UNIT/ML injection pen    Lancets 28G MISC    Insulin Pen Needle 31G X 8 MM MISC    dapagliflozin (FARXIGA) 5 MG tablet    Other Relevant Orders    POCT glycosylated hemoglobin (Hb A1C) (Completed)    Hypothyroidism       stable on present dose of levothyroxine 50 mcg a day, continue medication         Relevant Medications    levothyroxine (SYNTHROID) 50 MCG tablet       Nervous and Auditory    Neuropathy       I wonder she does not have lumbar radiculopathy plus peripheral neuropathy from diabetes. Patient has been reluctant to do any nerve conduction tests.   At present her symptoms are being controlled with gabapentin 300 mg twice daily. Prescription written         Relevant Medications    gabapentin (NEURONTIN) 300 MG capsule       Genitourinary    Chronic kidney disease       chronic kidney disease stage IIIb. She will continue avoiding NSAID's including topical NSAID's. Ensure adequate hydration. Monitor her blood sugars. Continue her Lantus 12 units at bedtime and start Farxiga 5 mg in the morning. Discontinue glimepiride. Monitor her blood sugars and if they are consistently over 120 fasting let me know and we will adjust her Farxiga dose            Other    Chronic pain       she was given a prescription for Norco on 10/20/2022. She tries not to take this on a daily basis. With the discontinuation of her NSAID gel she may increase the number of times she takes this. We will wait and see if she runs out of the medication before her next office visit. OARRS report reviewed and her use is appropriate. Chronic checkpoints reviewed with patient          Relevant Medications    HYDROcodone-acetaminophen ([address]) 5-325 MG per tablet    gabapentin (NEURONTIN) 300 MG capsule    Need for influenza vaccination       quadrivalent flu shot given         Relevant Orders    Influenza, FLUAD, (age 72 y+), IM, PF, 0.5 mL (Completed)    Encounter for screening mammogram for breast cancer       last mammogram was in 2020. She wants to get a mammogram this year so we will give her a requisition for screening mammogram to be done at [facility]. 38 CAD BILATERAL        Return in about 3 months (around 1/26/2023), or dm, chronic pain.        Janelle Perez, DO  10/26/2022

## 2022-11-22 ENCOUNTER — TELEPHONE (OUTPATIENT)
Dept: PRIMARY CARE CLINIC | Age: 87
End: 2022-11-22

## 2022-11-22 RX ORDER — NITROFURANTOIN 25; 75 MG/1; MG/1
100 CAPSULE ORAL 2 TIMES DAILY
Qty: 28 CAPSULE | Refills: 0 | Status: SHIPPED | OUTPATIENT
Start: 2022-11-22 | End: 2022-12-06

## 2022-11-22 NOTE — TELEPHONE ENCOUNTER
Pt is having burning and itching, she says she may have a uti since taking the new medication. Farxiga 5mg  Cant come in because she going to the bathroom to much. Would like some relief.

## 2023-02-06 DIAGNOSIS — N32.81 OVERACTIVE BLADDER: ICD-10-CM

## 2023-02-06 NOTE — TELEPHONE ENCOUNTER
Name of Medication(s) Requested:  mirabegron    Pharmacy Requested:   [facility]    Medication(s) pended? [x] Yes  [] No    Last Appointment:  10/26/2022    Future appts:  Future Appointments   Date Time Provider Gerry Osorio   2/9/2023  1:30 PM [address] [facility], [facility]          Does patient need call back?   [] Yes  [x] No

## 2023-02-09 ENCOUNTER — OFFICE VISIT (OUTPATIENT)
Dept: PRIMARY CARE CLINIC | Age: 88
End: 2023-02-09
Payer: MEDICARE

## 2023-02-09 VITALS
WEIGHT: 163 LBS | OXYGEN SATURATION: 97 % | HEIGHT: 61 IN | SYSTOLIC BLOOD PRESSURE: 108 MMHG | DIASTOLIC BLOOD PRESSURE: 60 MMHG | HEART RATE: 111 BPM | TEMPERATURE: 97.5 F | BODY MASS INDEX: 30.78 KG/M2

## 2023-02-09 DIAGNOSIS — E11.29 TYPE 2 DIABETES MELLITUS WITH OTHER DIABETIC KIDNEY COMPLICATION, WITH LONG-TERM CURRENT USE OF INSULIN (HCC): ICD-10-CM

## 2023-02-09 DIAGNOSIS — N17.9 AKI (ACUTE KIDNEY INJURY) (HCC): ICD-10-CM

## 2023-02-09 DIAGNOSIS — N18.32 STAGE 3B CHRONIC KIDNEY DISEASE (HCC): ICD-10-CM

## 2023-02-09 DIAGNOSIS — E03.9 ACQUIRED HYPOTHYROIDISM: ICD-10-CM

## 2023-02-09 DIAGNOSIS — K21.9 GASTROESOPHAGEAL REFLUX DISEASE, UNSPECIFIED WHETHER ESOPHAGITIS PRESENT: ICD-10-CM

## 2023-02-09 DIAGNOSIS — Z09 HOSPITAL DISCHARGE FOLLOW-UP: Primary | ICD-10-CM

## 2023-02-09 DIAGNOSIS — E11.65 TYPE 2 DIABETES MELLITUS WITH HYPERGLYCEMIA, WITH LONG-TERM CURRENT USE OF INSULIN (HCC): ICD-10-CM

## 2023-02-09 DIAGNOSIS — G62.9 NEUROPATHY: ICD-10-CM

## 2023-02-09 DIAGNOSIS — K52.9 COLITIS: ICD-10-CM

## 2023-02-09 DIAGNOSIS — D50.0 IRON DEFICIENCY ANEMIA DUE TO CHRONIC BLOOD LOSS: ICD-10-CM

## 2023-02-09 DIAGNOSIS — Z79.4 TYPE 2 DIABETES MELLITUS WITH OTHER DIABETIC KIDNEY COMPLICATION, WITH LONG-TERM CURRENT USE OF INSULIN (HCC): ICD-10-CM

## 2023-02-09 DIAGNOSIS — Z79.4 TYPE 2 DIABETES MELLITUS WITH HYPERGLYCEMIA, WITH LONG-TERM CURRENT USE OF INSULIN (HCC): ICD-10-CM

## 2023-02-09 DIAGNOSIS — E78.2 MIXED HYPERLIPIDEMIA: ICD-10-CM

## 2023-02-09 DIAGNOSIS — G89.29 OTHER CHRONIC PAIN: ICD-10-CM

## 2023-02-09 DIAGNOSIS — I10 ESSENTIAL HYPERTENSION: ICD-10-CM

## 2023-02-09 PROBLEM — M75.101 ROTATOR CUFF TEAR ARTHROPATHY OF BOTH SHOULDERS: Status: ACTIVE | Noted: 2023-02-09

## 2023-02-09 PROBLEM — D72.829 LEUCOCYTOSIS: Status: RESOLVED | Noted: 2022-01-31 | Resolved: 2023-02-09

## 2023-02-09 PROCEDURE — 99214 OFFICE O/P EST MOD 30 MIN: CPT | Performed by: INTERNAL MEDICINE

## 2023-02-09 PROCEDURE — 1111F DSCHRG MED/CURRENT MED MERGE: CPT | Performed by: INTERNAL MEDICINE

## 2023-02-09 PROCEDURE — 1123F ACP DISCUSS/DSCN MKR DOCD: CPT | Performed by: INTERNAL MEDICINE

## 2023-02-09 RX ORDER — DOXYCYCLINE HYCLATE 50 MG/1
324 CAPSULE, GELATIN COATED ORAL
Qty: 90 TABLET | Refills: 1 | Status: SHIPPED | OUTPATIENT
Start: 2023-02-09

## 2023-02-09 RX ORDER — HYDROCODONE BITARTRATE AND ACETAMINOPHEN 5; 325 MG/1; MG/1
1 TABLET ORAL EVERY 6 HOURS PRN
Qty: 28 TABLET | Refills: 0 | Status: SHIPPED | OUTPATIENT
Start: 2023-02-09 | End: 2023-02-16

## 2023-02-09 RX ORDER — DOXYCYCLINE HYCLATE 50 MG/1
324 CAPSULE, GELATIN COATED ORAL
COMMUNITY
End: 2023-02-09 | Stop reason: SDUPTHER

## 2023-02-09 RX ORDER — PANTOPRAZOLE SODIUM 40 MG/1
40 TABLET, DELAYED RELEASE ORAL DAILY
Qty: 90 TABLET | Refills: 1 | Status: SHIPPED | OUTPATIENT
Start: 2023-02-09

## 2023-02-09 RX ORDER — PANTOPRAZOLE SODIUM 40 MG/1
40 TABLET, DELAYED RELEASE ORAL DAILY
COMMUNITY
End: 2023-02-09 | Stop reason: SDUPTHER

## 2023-02-09 RX ORDER — LANCETS 23 GAUGE
EACH MISCELLANEOUS
Qty: 100 EACH | Refills: 5 | Status: SHIPPED | OUTPATIENT
Start: 2023-02-09

## 2023-02-09 RX ORDER — BLOOD SUGAR DIAGNOSTIC
STRIP MISCELLANEOUS
Qty: 100 EACH | Refills: 5 | Status: SHIPPED | OUTPATIENT
Start: 2023-02-09

## 2023-02-09 SDOH — ECONOMIC STABILITY: INCOME INSECURITY: HOW HARD IS IT FOR YOU TO PAY FOR THE VERY BASICS LIKE FOOD, HOUSING, MEDICAL CARE, AND HEATING?: NOT HARD AT ALL

## 2023-02-09 SDOH — ECONOMIC STABILITY: FOOD INSECURITY: WITHIN THE PAST 12 MONTHS, YOU WORRIED THAT YOUR FOOD WOULD RUN OUT BEFORE YOU GOT MONEY TO BUY MORE.: NEVER TRUE

## 2023-02-09 SDOH — ECONOMIC STABILITY: FOOD INSECURITY: WITHIN THE PAST 12 MONTHS, THE FOOD YOU BOUGHT JUST DIDN'T LAST AND YOU DIDN'T HAVE MONEY TO GET MORE.: NEVER TRUE

## 2023-02-09 SDOH — ECONOMIC STABILITY: HOUSING INSECURITY
IN THE LAST 12 MONTHS, WAS THERE A TIME WHEN YOU DID NOT HAVE A STEADY PLACE TO SLEEP OR SLEPT IN A SHELTER (INCLUDING NOW)?: NO

## 2023-02-09 ASSESSMENT — ENCOUNTER SYMPTOMS
CHOKING: 0
COLOR CHANGE: 0
SHORTNESS OF BREATH: 1
CHEST TIGHTNESS: 0
EYE PAIN: 0
BACK PAIN: 1
ABDOMINAL DISTENTION: 0
WHEEZING: 0
ANAL BLEEDING: 0
EYE DISCHARGE: 0
BLOOD IN STOOL: 0
SINUS PRESSURE: 0
CONSTIPATION: 0
ABDOMINAL PAIN: 0
RECTAL PAIN: 0
COUGH: 0

## 2023-02-09 ASSESSMENT — PATIENT HEALTH QUESTIONNAIRE - PHQ9
10. IF YOU CHECKED OFF ANY PROBLEMS, HOW DIFFICULT HAVE THESE PROBLEMS MADE IT FOR YOU TO DO YOUR WORK, TAKE CARE OF THINGS AT HOME, OR GET ALONG WITH OTHER PEOPLE: 0
3. TROUBLE FALLING OR STAYING ASLEEP: 0
6. FEELING BAD ABOUT YOURSELF - OR THAT YOU ARE A FAILURE OR HAVE LET YOURSELF OR YOUR FAMILY DOWN: 0
8. MOVING OR SPEAKING SO SLOWLY THAT OTHER PEOPLE COULD HAVE NOTICED. OR THE OPPOSITE, BEING SO FIGETY OR RESTLESS THAT YOU HAVE BEEN MOVING AROUND A LOT MORE THAN USUAL: 0
4. FEELING TIRED OR HAVING LITTLE ENERGY: 0
SUM OF ALL RESPONSES TO PHQ QUESTIONS 1-9: 0
9. THOUGHTS THAT YOU WOULD BE BETTER OFF DEAD, OR OF HURTING YOURSELF: 0
5. POOR APPETITE OR OVEREATING: 0
SUM OF ALL RESPONSES TO PHQ QUESTIONS 1-9: 0
SUM OF ALL RESPONSES TO PHQ QUESTIONS 1-9: 0
2. FEELING DOWN, DEPRESSED OR HOPELESS: 0
SUM OF ALL RESPONSES TO PHQ QUESTIONS 1-9: 0
7. TROUBLE CONCENTRATING ON THINGS, SUCH AS READING THE NEWSPAPER OR WATCHING TELEVISION: 0

## 2023-02-09 NOTE — PROGRESS NOTES
2023    Name: Lisha Ely : 1935 Sex: female  Age: 81 y.o. Subjective:  Chief Complaint   Patient presents with    Follow-Up from Hospital        HPI patient here for transition of care. She was hospitalized at [facility] on 2023 and discharged on 2023. She presented with  bright red rectal bleeding. She had abdominal pain, her red blood cell counts were dropping, they dropped to as low as about 9. She did not require any transfusions as the bleeding resolved. Was seen by GI, stool studies were done. She did not have C. difficile so vancomycin was discontinued. She was placed on IV Zosyn. CT scan of her abdomen revealed moderate thickening of the wall from the proximal transverse colon to the mid sigmoid colon. There were also concerned about possible brisk bleeding from an upper GI source. She underwent a panendoscopy done by Dr. Rachele Zurita. She had a biopsy done which showed ischemic colitis with mucosal ulceration. Her white count was elevated initially to 17,000 and after administering IV fluids and antibiotics it came down to 7700. Her hemoglobin also cresencio to 11.2 with a hematocrit of 34.6 at the time of discharge. She has known chronic kidney disease but she had acute kidney injury during this admission. Her BUN was in the 40s. Creatinine increased to 1.1 and her estimated GFR fell to 47. I reviewed hospital notes including ED reports, history and physical, discharge summary, notes from her imaging studies and her laboratory values. Medication reconciliation done. They discontinued her lansoprazole and switched her to pantoprazole 40 mg daily. They also started her on ferrous gluconate 324 mg daily.   From reviewing her medication records apparently glimepiride was also discontinued  She remains on amlodipine 10 mg daily, ezetimibe 10 mg daily, gabapentin 300 mg daily, levothyroxine 50 mcg daily, Lantus insulin 12 units subcu at noon, Myrbetriq 25 mg daily    She has a history of type 2 diabetes mellitus on insulin. She is only on 12 units of Lantus a day. Her hemoglobin A1c a few months ago was 6.7%. She had a few high blood sugars in the hospital but not that many. I wonder if she really needs to be on insulin. She is intolerant to metformin which caused diarrhea and she was told by renal that she should not be on it, she was intolerant of [place] which caused recurrent UTIs. She is no longer on glimepiride. She would like to see a an endocrinologist to find out if she is a candidate for continuous glucose monitoring. I would like him to see if she really needs insulin. She has a history of chronic pain. Pain radiates down her legs. She was maintained on as needed Norco in the past.  She was tried on tramadol which did not help. She is unable to take NSAIDs because of her GERD and kidney functions. Tylenol does not help. We discussed referral to pain management and she is willing to go. We will refer her to Dr. Camacho Gain  We also discussed medical cannabis at the request of her daughter and they are going to think about that. She has a history of ASH on CKD. She has seen Dr. Michelle Mares. We will monitor her kidney blood work. History of hypothyroidism with normal TSH and free T4    History of hyperlipidemia on ezetimibe. We have not had a lipid profile on her in a while. Review of Systems   Constitutional:  Positive for appetite change, fatigue and unexpected weight change. Negative for chills and fever. HENT:  Negative for congestion, ear pain and sinus pressure. Eyes:  Negative for pain and discharge. Respiratory:  Positive for shortness of breath. Negative for cough, choking, chest tightness and wheezing. Cardiovascular:  Negative for chest pain, palpitations and leg swelling. Gastrointestinal:  Negative for abdominal distention, abdominal pain, anal bleeding, blood in stool, constipation and rectal pain. Genitourinary:  Positive for frequency and urgency. Negative for difficulty urinating and dysuria. Musculoskeletal:  Positive for back pain. Mid lumbar pain sometimes radiating down her right leg to her toes. Skin:  Negative for color change and rash. Neurological:  Positive for weakness. Negative for dizziness, speech difficulty, numbness and headaches. Psychiatric/Behavioral:  Negative for behavioral problems and confusion. The patient is not nervous/anxious. Current Outpatient Medications:     pantoprazole (PROTONIX) 40 MG tablet, Take 1 tablet by mouth daily Take one tablet by mouth every day, Disp: 90 tablet, Rfl: 1    ferrous gluconate (FERGON) 324 (38 Fe) MG tablet, Take 1 tablet by mouth daily (with breakfast) Take one tablet by mouth every day, Disp: 90 tablet, Rfl: 1    Lancets 28G MISC, Test blood sugar twice daily and as needed for symptoms of low blood sugar., Disp: 100 each, Rfl: 5    blood glucose test strips (ONETOUCH VERIO) strip, TEST 2 TIMES DAILY AS NEEDED (SYMPTOMS OF LOW BLOOD SUGAR) AS INSTRUCTED, Disp: 100 each, Rfl: 5    HYDROcodone-acetaminophen (NORCO) 5-325 MG per tablet, Take 1 tablet by mouth every 6 hours as needed for Pain for up to 7 days. Intended supply: 7 days.  Take lowest dose possible to manage pain Max Daily Amount: 4 tablets, Disp: 28 tablet, Rfl: 0    mirabegron (MYRBETRIQ) 25 MG TB24, Take 1 tablet by mouth daily, Disp: 30 tablet, Rfl: 3    gabapentin (NEURONTIN) 300 MG capsule, TAKE ONE CAPSULE BY MOUTH TWICE A DAY, Disp: 180 capsule, Rfl: 1    Insulin Pen Needle 31G X 8 MM MISC, 1 each by Does not apply route daily, Disp: 100 each, Rfl: 3    conjugated estrogens (PREMARIN) 0.625 MG/GM vaginal cream, Place vaginally daily, Disp: 1 each, Rfl: 2    ezetimibe (ZETIA) 10 MG tablet, TAKE ONE TABLET BY MOUTH EVERY DAY, Disp: 90 tablet, Rfl: 3    levothyroxine (SYNTHROID) 50 MCG tablet, TAKE ONE TABLET BY MOUTH EVERY DAY MONDAY TO FRIDAY (NOT SATURDAY AND SUNDAY), Disp: 90 tablet, Rfl: 3    amLODIPine (NORVASC) 10 MG tablet, TAKE ONE TABLET BY MOUTH EVERY DAY, Disp: 90 tablet, Rfl: 3    insulin glargine (LANTUS SOLOSTAR) 100 UNIT/ML injection pen, Inject 12 Units into the skin daily, Disp: 5 pen, Rfl: 3    CALCIUM-MAGNESIUM-ZINC PO, Take 1 tablet by mouth in the morning and at bedtime , Disp: , Rfl:     Handicap Placard MISC, by Does not apply route Duration 5 years, Disp: 2 each, Rfl: 0    Ascorbic Acid (UNRULY-C PO), Take by mouth daily, Disp: , Rfl:     vitamin D (CHOLECALCIFEROL) 25 MCG (1000 UT) TABS tablet, Take 1,000 Units by mouth daily, Disp: , Rfl:     Blood Glucose Monitoring Suppl (BLOOD GLUCOSE MONITOR SYSTEM) w/Device KIT, Test blood sugar twice daily, and as needed for symptoms of low blood sugar. (Patient is insulin dependant.), Disp: 1 kit, Rfl: 0     Allergies   Allergen Reactions    Ibuprofen     Lisinopril      Other reaction(s): Cough    Nsaids Other (See Comments)    Pregabalin Swelling    Simvastatin     Statins Other (See Comments)     Muscle weakness         Past Medical History:   Diagnosis Date    Anemia     Chronic kidney disease     Stage III    Chronic pain     Cirrhosis (HCC)     Depression     Fibromyalgia     Gastroparesis     Pernicious anemia     Thyroid nodule        There are no preventive care reminders to display for this patient.      Patient Active Problem List   Diagnosis    Essential hypertension    Slow transit constipation    Diabetes mellitus (HCC)    Gastroparesis    Other chronic pain    Fibromyalgia    Thyroid nodule    Pernicious anemia    Hypothyroidism    Abnormal liver enzymes    Bladder prolapse, female, acquired    Urge incontinence    Fatty liver    Depression    Mixed hyperlipidemia    Chronic kidney disease    Dysuria    Overactive bladder    Herniated lumbar intervertebral disc    Left lumbar radiculopathy    Gastroesophageal reflux disease    Muscle spasm    Chronic pain of both shoulders    Vitamin B12 deficiency    Cervical radiculitis    Vitamin D deficiency    Need for shingles vaccine    Neuropathy    Visual problems    Diarrhea of presumed infectious origin    Primary osteoarthritis    Esophageal dysphagia    Colitis    ASH (acute kidney injury) ([address]    Stage 3b chronic kidney disease (HCC)    Rotator cuff tear arthropathy of both shoulders    Iron deficiency anemia due to chronic blood loss    Hospital discharge follow-up        Past Surgical History:   Procedure Laterality Date    APPENDECTOMY      CHOLECYSTECTOMY      EYE SURGERY      cataract OU    HEMORRHOID SURGERY      HYSTERECTOMY, TOTAL ABDOMINAL (CERVIX REMOVED)      JOINT REPLACEMENT Left     Knee     KNEE ARTHROPLASTY Right     SUBTOTAL COLECTOMY          Family History   Problem Relation Age of Onset    Other Mother         cva    High Blood Pressure Mother     Other Father         heart disease         Social History     Tobacco Use    Smoking status: Never    Smokeless tobacco: Never   Substance Use Topics    Alcohol use: Not Currently    Drug use: Not Currently     Comment: Norco 5/325        Objective  Vitals:    02/09/23 1431   BP: 108/60   Pulse: (!) 111   Temp: 97.5 °F (36.4 °C)   SpO2: 97%   Weight: 163 lb (73.9 kg)   Height: 5' 1\" (1.549 m)        Exam:  Physical Exam  Vitals reviewed. Exam conducted with a chaperone present. Constitutional:       General: She is not in acute distress. Appearance: She is ill-appearing. HENT:      Head: Normocephalic and atraumatic. Eyes:      General:         Right eye: No discharge. Left eye: No discharge. Conjunctiva/sclera: Conjunctivae normal.   Cardiovascular:      Rate and Rhythm: Normal rate and regular rhythm. Heart sounds: Normal heart sounds. No murmur heard. Pulmonary:      Effort: Pulmonary effort is normal. No respiratory distress. Breath sounds: Normal breath sounds. No wheezing or rales.    Abdominal:      General: Bowel sounds are normal. There is no distension. Palpations: Abdomen is soft. Tenderness: There is no abdominal tenderness. Musculoskeletal:         General: No swelling or deformity. Cervical back: Neck supple. No rigidity or tenderness. Comments: Tender to palpation mid lumbar spine. Skin:     General: Skin is warm and dry. Coloration: Skin is not jaundiced or pale. Neurological:      Mental Status: She is alert and oriented to person, place, and time. Psychiatric:         Mood and Affect: Mood normal.         Behavior: Behavior normal.         Thought Content: Thought content normal.         Judgment: Judgment normal.        Last labs reviewed. ASSESSMENT & PLAN :   Problem List          Circulatory    Essential hypertension       at goal.  Continue medications and monitor blood pressures at home         Relevant Medications    amLODIPine (NORVASC) 10 MG tablet    Other Relevant Orders    Comprehensive Metabolic Panel       Digestive    Colitis       resolved, no more rectal bleeding. Follow-up with GI and will monitor her blood counts         Gastroesophageal reflux disease       continue pantoprazole and follow-up with GI. Relevant Medications    pantoprazole (PROTONIX) 40 MG tablet       Endocrine    Diabetes mellitus (HCC)    Relevant Medications    Blood Glucose Monitoring Suppl (BLOOD GLUCOSE MONITOR SYSTEM) w/Device KIT    insulin glargine (LANTUS SOLOSTAR) 100 UNIT/ML injection pen    Insulin Pen Needle 31G X 8 MM MISC    Lancets 28G MISC    blood glucose test strips (ONETOUCH VERIO) strip    Other Relevant Orders    Hemoglobin A1C    Eugenie Lockhart MD, Endocrinology, [place]    Hypothyroidism       at goal.  Continue levothyroxine 50 mcg daily and check TSH and free T4         Relevant Medications    levothyroxine (SYNTHROID) 50 MCG tablet       Nervous and Auditory    Neuropathy       not at goal, still has some pain in her legs.   She will be referred to endocrinology and she will continue taking her gabapentin 300 mg daily         Relevant Medications    gabapentin (NEURONTIN) 300 MG capsule       Genitourinary    ASH (acute kidney injury) ([facility] [address]       hopefully improved. We will recheck CBC and CMP         Relevant Orders    CBC with Auto Differential    Stage 3b chronic kidney disease (HCC)       not at goal.  Check CBC and CMP, monitor adequate hydration, avoid NSAIDs and follow-up with renal         Relevant Orders    CBC with Auto Differential       Other    Iron deficiency anemia due to chronic blood loss       check CBC, continue ferrous gluconate. When she comes back and she is fasting we can check her iron studies. Relevant Medications    ferrous gluconate (FERGON) 324 (38 Fe) MG tablet    Other Relevant Orders    CBC with Auto Differential    Hospital discharge follow-up - Primary       old records available reviewed including ED report, H&P, discharge summary, consult, lab and imaging studies. Medication reconciliation done. Prescriptions written for ferrous gluconate and pantoprazole         Relevant Orders    [place] DISCHARGE MEDS RECONCILED W/ CURRENT OUTPATIENT MED LIST (Completed)    Other chronic pain       not at goal.  Referred to pain management. Await their recommendations. Prescription for Norco 5/325 mg given. OARRS report reviewed. And her use is appropriate. Last prescription of 60 pills Norco 5/325 was given on 10/20/2022. She knows about side effects of lethargy, confusion, unsteady gait and constipation. Hopefully will not have to keep her on narcotics after she is evaluated and treated by pain management         Relevant Medications    gabapentin (NEURONTIN) 300 MG capsule    HYDROcodone-acetaminophen (NORCO) 5-325 MG per tablet    Other Relevant Orders    Zulema Berry MD, Pain Medicine, [facility]    Mixed hyperlipidemia       at goal.  Has not had lipids done in a while.   Continue ezetimibe and will check lipids in the future as she is not fasting today         Relevant Medications    ezetimibe (ZETIA) 10 MG tablet    amLODIPine (NORVASC) 10 MG tablet        Return in 6 weeks (on 3/23/2023), or if symptoms worsen or fail to improve.        [employer], DO  2/9/2023

## 2023-02-09 NOTE — ASSESSMENT & PLAN NOTE
old records available reviewed including ED report, H&P, discharge summary, consult, lab and imaging studies. Medication reconciliation done.   Prescriptions written for ferrous gluconate and pantoprazole

## 2023-02-09 NOTE — ASSESSMENT & PLAN NOTE
not at goal, still has some pain in her legs.   She will be referred to endocrinology and she will continue taking her gabapentin 300 mg daily

## 2023-02-09 NOTE — ASSESSMENT & PLAN NOTE
check CBC, continue ferrous gluconate. When she comes back and she is fasting we can check her iron studies.

## 2023-02-09 NOTE — ASSESSMENT & PLAN NOTE
not at goal.  Referred to pain management. Await their recommendations. Prescription for Norco 5/325 mg given. OARRS report reviewed. And her use is appropriate. Last prescription of 60 pills Norco 5/325 was given on 10/20/2022. She knows about side effects of lethargy, confusion, unsteady gait and constipation.   Hopefully will not have to keep her on narcotics after she is evaluated and treated by pain management

## 2023-02-09 NOTE — ASSESSMENT & PLAN NOTE
at goal.  Has not had lipids done in a while.   Continue ezetimibe and will check lipids in the future as she is not fasting today

## 2023-02-23 ENCOUNTER — OFFICE VISIT (OUTPATIENT)
Dept: PAIN MANAGEMENT | Age: 88
End: 2023-02-23
Payer: MEDICARE

## 2023-02-23 VITALS
HEIGHT: 61 IN | HEART RATE: 74 BPM | DIASTOLIC BLOOD PRESSURE: 82 MMHG | TEMPERATURE: 98.2 F | RESPIRATION RATE: 16 BRPM | BODY MASS INDEX: 30.58 KG/M2 | OXYGEN SATURATION: 96 % | WEIGHT: 162 LBS | SYSTOLIC BLOOD PRESSURE: 118 MMHG

## 2023-02-23 DIAGNOSIS — M25.512 CHRONIC PAIN OF BOTH SHOULDERS: ICD-10-CM

## 2023-02-23 DIAGNOSIS — G89.29 CHRONIC HIP PAIN, BILATERAL: ICD-10-CM

## 2023-02-23 DIAGNOSIS — E11.42 DIABETIC PERIPHERAL NEUROPATHY (HCC): ICD-10-CM

## 2023-02-23 DIAGNOSIS — M25.551 CHRONIC HIP PAIN, BILATERAL: ICD-10-CM

## 2023-02-23 DIAGNOSIS — M25.511 CHRONIC PAIN OF BOTH SHOULDERS: ICD-10-CM

## 2023-02-23 DIAGNOSIS — G89.29 CHRONIC PAIN OF BOTH SHOULDERS: ICD-10-CM

## 2023-02-23 DIAGNOSIS — M25.552 CHRONIC HIP PAIN, BILATERAL: ICD-10-CM

## 2023-02-23 DIAGNOSIS — G89.29 CHRONIC BILATERAL LOW BACK PAIN WITH BILATERAL SCIATICA: Primary | ICD-10-CM

## 2023-02-23 DIAGNOSIS — M54.42 CHRONIC BILATERAL LOW BACK PAIN WITH BILATERAL SCIATICA: Primary | ICD-10-CM

## 2023-02-23 DIAGNOSIS — M54.41 CHRONIC BILATERAL LOW BACK PAIN WITH BILATERAL SCIATICA: Primary | ICD-10-CM

## 2023-02-23 PROCEDURE — 1123F ACP DISCUSS/DSCN MKR DOCD: CPT | Performed by: STUDENT IN AN ORGANIZED HEALTH CARE EDUCATION/TRAINING PROGRAM

## 2023-02-23 PROCEDURE — 99204 OFFICE O/P NEW MOD 45 MIN: CPT | Performed by: STUDENT IN AN ORGANIZED HEALTH CARE EDUCATION/TRAINING PROGRAM

## 2023-02-23 PROCEDURE — 3044F HG A1C LEVEL LT 7.0%: CPT | Performed by: STUDENT IN AN ORGANIZED HEALTH CARE EDUCATION/TRAINING PROGRAM

## 2023-02-23 RX ORDER — GLIMEPIRIDE 1 MG/1
1 TABLET ORAL
COMMUNITY

## 2023-02-23 RX ORDER — HYDROCODONE BITARTRATE AND ACETAMINOPHEN 5; 325 MG/1; MG/1
1 TABLET ORAL EVERY 6 HOURS PRN
COMMUNITY

## 2023-02-23 NOTE — PROGRESS NOTES
[address] [facility], [address] [place] [address]  Pain Medicine Consult Note    Patient: Bria Merino DOB 1935  Date of Service:  23  Requesting Physician:  Mike Tang DO  Chief Complaint: New Patient (Lower back )      HISTORY OF PRESENT ILLNESS:      Ms. Bria Mreino is a 81 y.o. female presented today for evaluation of  low back pain, b/l LE pain that has been ongoing for the past 5 years    She   has a past medical history of Anemia, Chronic kidney disease, Chronic pain, Cirrhosis ([address] Depression, Fibromyalgia, Gastroparesis, Pernicious anemia, and Thyroid nodule. .      Pain:  Location: low back , b/L legs  Inciting Factor: none  Duration: 5+ years   Description: constant  Quality: aching. Level (worst): 10  Radiation:  yes - both legs to calves   Numbness/Tingling: yes - both legs  Aggravating factors: movement, walking, standing, sitting, bending  Alleviating factors: heat and ice.     Blood Thinners/Anticoagulation:  no  Herbal Supplements: no  Pertinent Allergies: yes - Lyrica (swelling), NSAIDS (CKD)   Diabetic: yes - A1C 6.7 - seeing Endo  Bowel/Bladder Incontinence: yes - related to bowel related issues/ surgeries, urge incontinence with h/o bladder botox injections      Medications:    NSAID's : no  - unable due to CKD  APAPs: yes - Tylenol   Patches/Gels: yes - heating pads   Membrane stabilizers :   Current -  yes - Gabapentin 300 mg BID  Previous - yes - Lyrica    Opioids :   Current - yes - Norco 5/325 mg PRN - Dr. Slick Marrufo    Previous - no   Muscle Relaxants: no  Steroids: no   Benzodiazepines: no   Anti-depres/Anti-Pscyh: no   Adjuvants or Others : no      Previous treatments:    Physical Therapy : did some in hospital    Chiropractic treatment: no   TENS Unit: yes, used    Surgeries: yes, Left Knee TKR    Interventional Pain procedures/ nerve blocks: yes, Annie    Previous Pain Physicians: yes -  [facility]    Any Discharges: unknown    Epidurals: yes - good relief     Current Medications:   Ascorbic Acid, Blood Glucose Monitor System, Calcium-Magnesium-Zinc, HYDROcodone-acetaminophen, Handicap Placard, Insulin Pen Needle, Lancets 28G, amLODIPine, blood glucose test strips, estrogens conjugated, ezetimibe, ferrous gluconate, gabapentin, glimepiride, insulin glargine, levothyroxine, mirabegron, pantoprazole, and vitamin D     Social History:  Work Hx: Retired   Currently in Litigation: no    H/O Smoking: denies   H/O alcohol abuse : denies   H/O Illicit drug use : denies   Social History     Substance and Sexual Activity   Drug Use Not Currently    Comment: Norco          Last Urine Screen:   No results found for: [address], BARBSCNU, LABBENZ, CANSU, COCAIMETSCRU, OPIATESCREENURINE, PHENCYCLIDINESCREENURINE, LABMETH, OXYCODONEUR, FENTSCRUR, DSCOMMENT    Imaging:   XRAY:    MRI:  No results found for this or any previous visit from the past 3650 days. CT:  CT ABDOMEN PELVIS [address] [place], [place] [address]y  [phone]    CT Scan Report  Signed    Patient: Brody Yoo                                                                MR#: [id number]  : 1935                                                                [de-identified]    Age/Sex: 81 / F                                                                Admit Date: 23    Loc: ER  Attending Dr:    Ordering Physician: Naty Phillips MD  Date of Service: 23  Procedure(s): CT abdomen pelvis wo con  Accession Number(s): [id number]    cc: Nikolai Wilson MD      INDICATION:  Bleeding pain, leukocytosis. TECHNIQUE:  CT of the abdomen and pelvis was performed without intravenous  contrast.    Automated mA/kV exposure control was utilized and patient examination was performed  in strict accordance with principles of ALARA.     RADIATION AMOUNT:  590 mGy-cm. COMPARISON:  US pelvis 11/20/2019. CT AP 6/10/2019. FINDINGS:  Abdomen: The lung bases are clear. Coronary artery calcifications. Gallbladder is absent. Right adrenal adenoma measuring 2 cm. Small left renal cyst measuring 1.5 cm. Mild to moderate renal cortical atrophy. No renal/ureteral calculi. The liver,  spleen, pancreas, adrenal glands, and kidneys are suboptimally evaluated on these  unenhanced images, but demonstrate no acute pathology. There is no free air or lymph node enlargement. Abdominal aorta is not aneurysmal.  Severe plaque along the distal aorta. Pelvis:  Moderate to severe wall thickening of the colon extending from the proximal  transverse colon to the mid sigmoid colon. Distal colonic anastomosis noted. Colonic diverticulosis without findings of diverticulitis. There is no free fluid. Lymph nodes are not enlarged. Urinary bladder is unremarkable. Skeleton:  There are no acute fractures. No suspicious bony lesions. IMPRESSION:  1. Findings compatible with moderate to severe segmental colitis extending from the  proximal transverse colon to the mid sigmoid colon. 2. Colonic diverticulosis without findings of diverticulitis. 3. Right adrenal adenoma. 4. Coronary artery calcifications.       Signed by Danial Greer MD          Dictated By: Adenike Acosta MD    DD/DT: 01/28/23 1458  Signed By: Adenike Acosta MD 01/28/23 1505    :      EMG:    Pertinent Labs:   Lab Results   Component Value Date/Time    BUN 25 02/09/2023 03:41 PM    CREATININE 1.3 02/09/2023 03:41 PM    GLUCOSE 135 02/09/2023 03:41 PM    AST 20 02/09/2023 03:41 PM    ALT 13 02/09/2023 03:41 PM    LABA1C 6.8 02/09/2023 03:41 PM    INR 1.09 01/28/2023 01:15 PM     02/09/2023 03:41 PM       Past Medical History:   Diagnosis Date    Anemia     Chronic kidney disease     Stage III    Chronic pain     Cirrhosis ([address]     Depression     Fibromyalgia     Gastroparesis Pernicious anemia     Thyroid nodule        Past Surgical History:   Procedure Laterality Date    APPENDECTOMY      CHOLECYSTECTOMY      EYE SURGERY      cataract OU    HEMORRHOID SURGERY      HYSTERECTOMY, TOTAL ABDOMINAL (CERVIX REMOVED)      JOINT REPLACEMENT Left     Knee     KNEE ARTHROPLASTY Right     SUBTOTAL COLECTOMY         Prior to Admission medications    Medication Sig Start Date End Date Taking? Authorizing Provider   glimepiride (AMARYL) 1 MG tablet Take 1 mg by mouth every morning (before breakfast)   Yes Historical Provider, MD   HYDROcodone-acetaminophen (NORCO) 5-325 MG per tablet Take 1 tablet by mouth every 6 hours as needed for Pain. Yes Historical Provider, MD   pantoprazole (PROTONIX) 40 MG tablet Take 1 tablet by mouth daily Take one tablet by mouth every day 2/9/23  Yes Queta Mckoy DO   ferrous gluconate (FERGON) 324 (38 Fe) MG tablet Take 1 tablet by mouth daily (with breakfast) Take one tablet by mouth every day 2/9/23  Yes Zack Gomez, DO   Lancets 28G MISC Test blood sugar twice daily and as needed for symptoms of low blood sugar.  2/9/23  Yes Queta Mckoy DO   blood glucose test strips (ONETOUCH VERIO) strip TEST 2 TIMES DAILY AS NEEDED (SYMPTOMS OF LOW BLOOD SUGAR) AS INSTRUCTED 2/9/23  Yes Queta Mckoy DO   mirabegron (MYRBETRIQ) 25 MG TB24 Take 1 tablet by mouth daily 2/6/23  Yes Queta Mckoy DO   gabapentin (NEURONTIN) 300 MG capsule TAKE ONE CAPSULE BY MOUTH TWICE A DAY 10/26/22 2/23/23 Yes Queta Mckoy DO   Insulin Pen Needle 31G X 8 MM MISC 1 each by Does not apply route daily 10/26/22  Yes Queta Mckoy DO   conjugated estrogens (PREMARIN) 0.625 MG/GM vaginal cream Place vaginally daily 9/20/22  Yes Queta Mckoy DO   ezetimibe (ZETIA) 10 MG tablet TAKE ONE TABLET BY MOUTH EVERY DAY 9/9/22  Yes Queta Mckoy DO   levothyroxine (SYNTHROID) 50 MCG tablet TAKE ONE TABLET BY MOUTH EVERY DAY MONDAY TO FRIDAY (NOT SATURDAY AND SUNDAY) 9/9/22  Yes Gongora Jaleesa DO Leelee   amLODIPine (NORVASC) 10 MG tablet TAKE ONE TABLET BY MOUTH EVERY DAY 9/9/22  Yes Queta Mckoy DO   insulin glargine (LANTUS SOLOSTAR) 100 UNIT/ML injection pen Inject 12 Units into the skin daily 5/24/22  Yes Queta Mckoy DO   CALCIUM-MAGNESIUM-ZINC PO Take 1 tablet by mouth in the morning and at bedtime    Yes Historical Provider, MD   Handicap Placard MISC by Does not apply route Duration 5 years 5/13/21  Yes Queta Mckoy DO   Ascorbic Acid (UNRULY-C PO) Take by mouth daily   Yes Historical Provider, MD   vitamin D (CHOLECALCIFEROL) 25 MCG (1000 UT) TABS tablet Take 1,000 Units by mouth daily   Yes Historical Provider, MD   Blood Glucose Monitoring Suppl (BLOOD GLUCOSE MONITOR SYSTEM) w/Device KIT Test blood sugar twice daily, and as needed for symptoms of low blood sugar. (Patient is insulin dependant.) 12/19/19  Yes Queta Mckoy DO       Allergies   Allergen Reactions    Ibuprofen     Lisinopril      Other reaction(s): Cough    Nsaids Other (See Comments)    Pregabalin Swelling    Simvastatin     Statins Other (See Comments)     Muscle weakness        Social History     Tobacco Use    Smoking status: Never    Smokeless tobacco: Never   Substance Use Topics    Alcohol use: Not Currently    Drug use: Not Currently     Comment: [employer]       family history includes High Blood Pressure in her mother; Other in her father and mother. REVIEW OF SYSTEMS:   Patient specifically denies fever/chills, chest pain, shortness of breath, new bowel or bladder complaints. All other review of systems was negative except as noted above.     PHYSICAL EXAMINATION:    /82   Pulse 74   Temp 98.2 °F (36.8 °C) (Temporal)   Resp 16   Ht 5' 1\" (1.549 m)   Wt 162 lb (73.5 kg)   SpO2 96%   BMI 30.61 kg/m²     General:  Pleasant in no distress and A & O x 3, Build:Overweight, Function: Rises from a seated position with difficulty     HEENT:normocephalic, atraumatic, Pupils regular, round, equal Sclera: icterus absent      Lungs: Breathing:normal breath pattern, unlabored    CVS: RRR, pulses intact b/l    Abdomen: large midline surgical scar, non-distended, and normal Non tender, no guarding. Cervical spine: Inspection: normal   Palpation: No tenderness over the midline and paraspinal area, bilaterally   Range of motion: Normal flexion, extension, rotation bilaterally and is not painful. Spurling's: negative bilaterally   Pillai's: negative bilaterally     Thoracic spine: Inspection: normal   Palpation:Yes tenderness over the midline and paraspinal area, bilaterally  Range of motion: normal in flexion, extension rotation bilateral and is not painful. Lumbar spine: Inspection: exaggerated kyphosis   Spine Range of motion: Decreased, flexion Decreased,  extension Decreased, Lateral bending, and rotation bilaterally reduced is somewhat painful. Palpation:tenderness paravertebral muscles and midline tenderness. Facet Loading: left -positive and right-positive.     Musculoskeletal:  Sacroiliac joint tenderness No bilaterally   MARCIA test: positive right   Gaenslen's test:negative bilaterally  SLR : positive bilaterally   Trochanteric bursa tenderness: positive right     Extremities:  Tremors: No  - bilaterally upper and lower   Shoulders:   ROM: reduced bilaterally   Palpation:  Yes tenderness over bilateral shoulder  Hips: some pain with internal rotation of hips   Varicose veins: No    Pulses: present Lt radial   Cyanosis: none   Edema: No  x all 4 extremities     Neurological: Sensory:normal to light touch decreased in stocking/glove distrubution  , CN 2-12 grossly intact      MOTOR Left (x/5) Right (x/5)   Shoulder Abduction (C5)  4+ 4+   Biceps - Elbow Flexion (C6)  4+ 4+   Triceps - Elbow Extension (C7)  5 5   Hand  (C8)  5 5   Iliopsoas - Hip flex /Abd (L2)  5 5   Quadriceps - Knee Ext (L3)  5 5   Ant Tibialis - Ankle Dorsiflex (L4)  5 5   Post Tibialis - Hip Ext/Abd Foot dorsiflex (L5) 5 5   Gastrocnemius - Plantar flex (S1)  5 5     REFLEXES Left Right   Brachioradialis (C5/6)  2+ 2+   Biceps (C5/6)  2+ 2+   Triceps (C7)  2+ 2+   Quadriceps / Patellar (L4)  0 0   Achilles (S1)  1+ 1+     Gait:abnormal and antalgic  Assistance Devices: cane    Dermatology: Skin:no unusual rashes    Impression:  Assessment/Plan:  Diagnoses and all orders for this visit:  Chronic bilateral low back pain with bilateral sciatica  -     XR LUMBAR SPINE (2-3 VIEWS); Future  -     MRI LUMBAR SPINE WO CONTRAST; Future  -     Amb External Referral To Physical Therapy  Chronic hip pain, bilateral  -     XR LUMBAR SPINE (2-3 VIEWS); Future  -     XR HIP BILATERAL W AP PELVIS (2 VIEWS); Future  -     Amb External Referral To Physical Therapy  Chronic pain of both shoulders  -     XR LUMBAR SPINE (2-3 VIEWS); Future  -     Amb External Referral To Physical Therapy  Diabetic peripheral neuropathy (HCC)  -     Amb External Referral To Physical Therapy    81 y.o. female with h/o  HTN, DM, Fibromyalgia, Hypothyroidism, urge incontinence, Fatty Liver, HLD, CKD 3, GERD, Neuropathy, b/l rotator cuff tears, Knee O/A w/ recent hospitalization for ischemic colitis who p/w chronic low back pain, b/l hip pain, b/l shoulder pain . Previous treatments included norco, gabapentin, pt/ot, lyrica, tylenol, heating pads, epidural injections. Imaging (reviewed with patient) XR cervical showed Multilevel degenerative disc disease of cervical spine. There is no recent imaging of her back, legs or hips that has been done or in the system. Physical exam was significant for limited shoulder abduction (b/l) and TTP b/l. TTP over thoracic, lumbar b/l paraspinals and midline, positive MARCIA right, positive SLR b/l, negative SIJ b/l, positive GTB right, absent b/l knee reflexes, intact overall LE strength, decreased sensation in stocking/glove distribution b/l.      Differential diagnosis includes (but is not limited to) lumbar spinal stenosis with neurogenic claudication, lumbar radiculopathy, lumbar spondylosis, diabetic peripheral neuropathy, bilateral hip osteoarthritis, greater trochanteric bursitis, sacroiliitis, myofascial pain, fibromyalgia. Our treatment plan will start with PT for low back / hips - rx given, prefers [facility]. She already started PT in hospital and completed a few sessions. Next we will obtain XR Lumbar spine, XR Hips as there is no recent imaging of her back done. Next we will need MRI Lumbar spine w/o contrast to rule out stenosis or other neurogenic causes of her pain. Pending this workup, we would then consider interventions. Regarding medication, I would recommend she stay at Gabapentin total daily dose 600mg given her CKD. She can continue Norco as prescribed by Dr. Olimpia Nichole, although given her issues with constipation, I would not recommend increasing the dose or taking it frequently. She can continue to ice and heat the low back and use a TENS unit and voltaren gel. I would recommend follow up with Ortho re: shoulders, right knee. Finally, we will send a request for records from [facility] as she did have epidurals with them in the past.    The patient was understanding and in agreement with this plan. They will follow up with us in 4 weeks. Plan:   Therapy: Start PT - Low back, Hips, - External Rx sent  Imaging: XR Lumbar, XR Hips, MRI Lumbar spine w.o contast  Medications: no new  Interventions: consider pending workup  Consults: follow up with Ortho re: shoulders/right knee  Follow up: 4 weeks  Urine screen today: no   Previous Records/Imaging: Obtain full pertinent and past medical records, including Imaging CDs and radiology reports. Counseling :  Patient encouraged to stay active and to watch/lose weight   Encouraged to continue Regular home exercise program as tolerated - stretching / strengthening.    Smoking cessation counseling : no   Treatment plan discussed with the patient including medication and procedure side effects, as well as benefits and alternatives and the patient expressed understanding. We discussed with the patient that combining opioids, benzodiazepines, alcohol, illicit drugs or sleep aids increases the risk of respiratory depression including death. We discussed that these medications may cause drowsiness, sedation or dizziness and have counseled the patient not to drive or operate machinery. We have discussed that these medications will be prescribed only by one provider. We have discussed with the patient about age related risk factors and have thoroughly discussed the importance of taking these medications as prescribed. The patient verbalizes understanding. I spent a total of 54 minutes on the date of the service which included preparing to see the patient, face-to-face patient care, completing clinical documentation, obtaining and/or reviewing separately obtained history, performing a medically appropriate exam, counseling and educating the patient/family/caregiver and ordering medications, tests, or procedures. NOTE: The above documentation was prepared using voice recognition software. Every attempt was made to ensure accuracy but there may be spelling, grammatical, and contextual errors. Anupama Christianson MD    Controlled Substances Monitoring:   RX Monitoring 2/9/2023   Periodic Controlled Substance Monitoring Possible medication side effects, risk of tolerance/dependence & alternative treatments discussed. ;No signs of potential drug abuse or diversion identified. ;Assessed functional status.        OARRS report reviewed 2/23/23   CC:     Corwin Goodpasture,   [address] [facility] [address],  [address]   Gil Lyles DO   [place] [id number]

## 2023-02-23 NOTE — PROGRESS NOTES
Patient: CARLIE Berry 1935  Date of Service:  23      Do you currently have any of the following:    Fever: No  Headache:  No  Cough: No  Shortness of breath: No  Exposed to anyone with these symptoms: No       Patient presents to [facility] with complaints of lower back  pain that started 5 years ago and has been getting worse. She states the pain began following No specific cause    Pain is constant and is described as aching, throbbing, shooting, stabbing, and sharp. She rates the pain as a 10/10 on her worst day , 6/10 on her best day, and a 8/10 on average on the VAS scale. Pain does radiate to right leg. She  has weakness of the right arm. Alleviating factors include: heat and ice. Aggravating factors include:  movement, walking, standing, sitting, bending. She states that the pain does keep her from sleeping at night. She took her last dose of tylenol,gabapentin lasst night. She is not on NSAIDS and  is not on anticoagulation medications   Previous treatments: Physical Therapy, Epidural Steroid Injection, and medications. .      Personal Expectations from this treatment: increase activity and decrease pain    Temp 98.2 °F (36.8 °C) (Temporal)   Ht 5' 1\" (1.549 m)   Wt 162 lb (73.5 kg)   BMI 30.61 kg/m²     No LMP recorded. Patient has had a hysterectomy.

## 2023-03-23 ENCOUNTER — OFFICE VISIT (OUTPATIENT)
Dept: PRIMARY CARE CLINIC | Age: 88
End: 2023-03-23
Payer: MEDICARE

## 2023-03-23 VITALS
TEMPERATURE: 98 F | WEIGHT: 164 LBS | OXYGEN SATURATION: 99 % | SYSTOLIC BLOOD PRESSURE: 132 MMHG | BODY MASS INDEX: 30.96 KG/M2 | DIASTOLIC BLOOD PRESSURE: 70 MMHG | HEIGHT: 61 IN | HEART RATE: 92 BPM

## 2023-03-23 DIAGNOSIS — I10 ESSENTIAL HYPERTENSION: Primary | ICD-10-CM

## 2023-03-23 DIAGNOSIS — E11.65 TYPE 2 DIABETES MELLITUS WITH HYPERGLYCEMIA, WITH LONG-TERM CURRENT USE OF INSULIN (HCC): ICD-10-CM

## 2023-03-23 DIAGNOSIS — E03.9 ACQUIRED HYPOTHYROIDISM: ICD-10-CM

## 2023-03-23 DIAGNOSIS — G62.9 NEUROPATHY: ICD-10-CM

## 2023-03-23 DIAGNOSIS — M51.26 HERNIATED LUMBAR INTERVERTEBRAL DISC: ICD-10-CM

## 2023-03-23 DIAGNOSIS — D50.0 IRON DEFICIENCY ANEMIA DUE TO CHRONIC BLOOD LOSS: ICD-10-CM

## 2023-03-23 DIAGNOSIS — N32.81 OVERACTIVE BLADDER: ICD-10-CM

## 2023-03-23 DIAGNOSIS — E78.2 MIXED HYPERLIPIDEMIA: ICD-10-CM

## 2023-03-23 DIAGNOSIS — N18.32 STAGE 3B CHRONIC KIDNEY DISEASE (HCC): ICD-10-CM

## 2023-03-23 DIAGNOSIS — Z79.4 TYPE 2 DIABETES MELLITUS WITH HYPERGLYCEMIA, WITH LONG-TERM CURRENT USE OF INSULIN (HCC): ICD-10-CM

## 2023-03-23 DIAGNOSIS — G89.29 OTHER CHRONIC PAIN: ICD-10-CM

## 2023-03-23 PROCEDURE — 3044F HG A1C LEVEL LT 7.0%: CPT | Performed by: INTERNAL MEDICINE

## 2023-03-23 PROCEDURE — 1123F ACP DISCUSS/DSCN MKR DOCD: CPT | Performed by: INTERNAL MEDICINE

## 2023-03-23 PROCEDURE — 99214 OFFICE O/P EST MOD 30 MIN: CPT | Performed by: INTERNAL MEDICINE

## 2023-03-23 RX ORDER — GABAPENTIN 300 MG/1
CAPSULE ORAL
Qty: 180 CAPSULE | Refills: 1 | Status: SHIPPED | OUTPATIENT
Start: 2023-03-23 | End: 2023-08-18

## 2023-03-23 RX ORDER — CONJUGATED ESTROGENS 0.62 MG/G
CREAM VAGINAL DAILY
Qty: 30 G | Refills: 4 | Status: SHIPPED | OUTPATIENT
Start: 2023-03-23

## 2023-03-23 RX ORDER — INSULIN GLARGINE 100 [IU]/ML
12 INJECTION, SOLUTION SUBCUTANEOUS DAILY
Qty: 5 ADJUSTABLE DOSE PRE-FILLED PEN SYRINGE | Refills: 5 | Status: SHIPPED | OUTPATIENT
Start: 2023-03-23

## 2023-03-23 ASSESSMENT — ENCOUNTER SYMPTOMS
SINUS PRESSURE: 0
COUGH: 0
BLOOD IN STOOL: 0
SHORTNESS OF BREATH: 1
BACK PAIN: 1
EYE PAIN: 0
EYE DISCHARGE: 0
CONSTIPATION: 0
RECTAL PAIN: 0
WHEEZING: 0
ANAL BLEEDING: 0
COLOR CHANGE: 0
CHEST TIGHTNESS: 0
CHOKING: 0
ABDOMINAL PAIN: 0
ABDOMINAL DISTENTION: 0

## 2023-03-23 NOTE — PROGRESS NOTES
3/27/2023    Name: Gala Cheung : 1935 Sex: female  Age: 81 y.o. Subjective:  Chief Complaint   Patient presents with    Hypertension        HPI     Patient no longer has any rectal bleeding or abdominal pain. Her colitis is resolved. She is on amlodipine 10 mg daily, ezetimibe 10 mg daily, gabapentin 300 mg daily, levothyroxine 50 mcg daily, Lantus insulin 12 units subcu at noon, Myrbetriq 25 mg daily    She has a history of type 2 diabetes mellitus on insulin. She is only on 12 units of Lantus a day. Her hemoglobin A1c a few months ago was 6.7%. She had a few high blood sugars in the hospital but not that many. I wonder if she really needs to be on insulin. She is intolerant to metformin which caused diarrhea and she was told by renal that she should not be on it, she was intolerant of Sharene Becket which caused recurrent UTIs. She is no longer on glimepiride. She refuses to see an endocrinologist.  Had a long talk with her and her daughter regarding the use of a continuous glucose monitor. Her daughter feels that patient is with it enough to use the monitor but the patient does not want to try it. We tried to convince her to at least give it a try as she has to prick her fingers 3-4 times a day. She says she does not mind pricking her fingers and she will not try the monitor    She has a history of chronic pain. Pain radiates down her legs. She was maintained on as needed Norco in the past.  She was tried on tramadol which did not help. She is unable to take NSAIDs because of her GERD and kidney functions. Tylenol does not help. We discussed referral to pain management and she is willing to go. We will refer her to Dr. Vishnu Monterroso  We also discussed medical cannabis at the request of her daughter and they are going to think about that. She has decided not to try medical cannabis.   She saw Dr. Vishnu Monterroso and he did an MRI on her back and is seeing her in a week or so to discuss the

## 2023-03-27 NOTE — ASSESSMENT & PLAN NOTE
insulin-dependent. On Lantus and glimepiride. .  Continue her medications and monitor her hemoglobin A1c prescription given for Lantus [place]

## 2023-03-27 NOTE — ASSESSMENT & PLAN NOTE
not at goal.  Still has a lot of pain down her legs. She follows up with pain management.   Continue gabapentin and Norco.  Prescription given for gabapentin 300 mg once a day

## 2023-03-27 NOTE — ASSESSMENT & PLAN NOTE
goal.  TSH and free T4 were acceptable in the past but we need to recheck them.   Continue her levothyroxine 50 mcg daily

## 2023-03-27 NOTE — ASSESSMENT & PLAN NOTE
not at goal.  LDL still 102. Continue her diet, check lipids.   Continue ezetimibe but she is intolerant of statins

## 2023-03-30 ENCOUNTER — OFFICE VISIT (OUTPATIENT)
Dept: PAIN MANAGEMENT | Age: 88
End: 2023-03-30
Payer: MEDICARE

## 2023-03-30 VITALS
SYSTOLIC BLOOD PRESSURE: 124 MMHG | WEIGHT: 162 LBS | DIASTOLIC BLOOD PRESSURE: 82 MMHG | BODY MASS INDEX: 30.58 KG/M2 | OXYGEN SATURATION: 97 % | RESPIRATION RATE: 16 BRPM | HEIGHT: 61 IN | HEART RATE: 104 BPM | TEMPERATURE: 97.2 F

## 2023-03-30 DIAGNOSIS — M54.42 CHRONIC BILATERAL LOW BACK PAIN WITH BILATERAL SCIATICA: ICD-10-CM

## 2023-03-30 DIAGNOSIS — M54.16 LUMBAR RADICULOPATHY: Primary | ICD-10-CM

## 2023-03-30 DIAGNOSIS — M54.41 CHRONIC BILATERAL LOW BACK PAIN WITH BILATERAL SCIATICA: ICD-10-CM

## 2023-03-30 DIAGNOSIS — M25.512 CHRONIC PAIN OF BOTH SHOULDERS: ICD-10-CM

## 2023-03-30 DIAGNOSIS — G89.29 CHRONIC PAIN OF BOTH SHOULDERS: ICD-10-CM

## 2023-03-30 DIAGNOSIS — E11.42 DIABETIC PERIPHERAL NEUROPATHY (HCC): ICD-10-CM

## 2023-03-30 DIAGNOSIS — G89.29 CHRONIC HIP PAIN, BILATERAL: ICD-10-CM

## 2023-03-30 DIAGNOSIS — G89.29 CHRONIC BILATERAL LOW BACK PAIN WITH BILATERAL SCIATICA: ICD-10-CM

## 2023-03-30 DIAGNOSIS — M43.16 ANTEROLISTHESIS OF LUMBAR SPINE: ICD-10-CM

## 2023-03-30 DIAGNOSIS — M25.551 CHRONIC HIP PAIN, BILATERAL: ICD-10-CM

## 2023-03-30 DIAGNOSIS — M25.552 CHRONIC HIP PAIN, BILATERAL: ICD-10-CM

## 2023-03-30 DIAGNOSIS — M25.511 CHRONIC PAIN OF BOTH SHOULDERS: ICD-10-CM

## 2023-03-30 PROCEDURE — 1123F ACP DISCUSS/DSCN MKR DOCD: CPT | Performed by: STUDENT IN AN ORGANIZED HEALTH CARE EDUCATION/TRAINING PROGRAM

## 2023-03-30 PROCEDURE — 99213 OFFICE O/P EST LOW 20 MIN: CPT | Performed by: STUDENT IN AN ORGANIZED HEALTH CARE EDUCATION/TRAINING PROGRAM

## 2023-03-30 PROCEDURE — 3044F HG A1C LEVEL LT 7.0%: CPT | Performed by: STUDENT IN AN ORGANIZED HEALTH CARE EDUCATION/TRAINING PROGRAM

## 2023-03-30 RX ORDER — SODIUM CHLORIDE 0.9 % (FLUSH) 0.9 %
5-40 SYRINGE (ML) INJECTION PRN
OUTPATIENT
Start: 2023-03-30

## 2023-03-30 RX ORDER — SODIUM CHLORIDE 0.9 % (FLUSH) 0.9 %
5-40 SYRINGE (ML) INJECTION EVERY 12 HOURS SCHEDULED
OUTPATIENT
Start: 2023-03-30

## 2023-03-30 RX ORDER — SODIUM CHLORIDE 9 MG/ML
INJECTION, SOLUTION INTRAVENOUS PRN
OUTPATIENT
Start: 2023-03-30

## 2023-03-30 NOTE — PROGRESS NOTES
[address] [facility], [address] [place] [address] Medicine Follow Up Note      Valerie Adams     Date of Visit:  3/30/2023    CC:  Patient presents for follow up   Chief Complaint   Patient presents with    Follow-up     Low back pain        HPI:    Pain is unchanged. Medication side effects:none. Recent interventional procedures:none. .  Blood Thinners/Anticoagulation:  no  Herbal Supplements: no  Pertinent Allergies: yes - Lyrica (swelling), NSAIDS (CKD)   Diabetic: yes - A1C 6.7 - seeing Endo  Bowel/Bladder Incontinence: yes - related to bowel related issues/ surgeries, urge incontinence with h/o bladder botox injections      Previous Plan:  PT - external  XR Lumbar done  XR hips done  MRI Lumbar done  Follow up Ortho - not done    Interval Changes:  Still having ongoing pain  Had issues with rectal bleed/ abd pain - hospitalization - now resolved    Procedures: no          Imaging: New: yes,       XRAY:  XR Lumbar 2/2023     FINDINGS:   There is a moderate amount of residual fecal debris throughout the colon. There is significant amount of aortic vascular plaque. Vertebral body height is normal.  There is slight anterior subluxation of L3   with respect L4. There is multilevel mild-to-moderate disc space narrowing   throughout the lumbar spine. There are small posterior spurs at L5-S1. Impression   Slight anterior subluxation of L3 with respect L4     XR Hips  2/2023     FINDINGS:   There is a mild amount of residual fecal debris in visualized colonic loops   and rectum. Hip joints are symmetric and unremarkable without joint space   narrowing. There is mild degenerative changes in lower lumbar spine. Pelvic   calcifications are presumably phleboliths. There is a staple line in the   pelvis.            Impression   No acute process             MRI:  MRI LUMBAR SPINE WO CONTRAST 03/21/23     FINDINGS:  There is grade 1

## 2023-03-30 NOTE — PROGRESS NOTES
Shekhar Cage presents to the [facility] on 3/30/2023. Aenl Dimas is complaining of pain low back. The pain is constant. The pain is described as aching, throbbing, shooting, and stabbing. Pain is rated on her best day at a 5, on her worst day at a 10, and on average at a 7 on the VAS scale. She took her last dose of Norco 2 days ago. Tylenol this morning . Any procedures since your last visit: No    Pacemaker or defibrillator: No   She is not on NSAIDS and is not on anticoagulation medications   Medication Contract and Consent for Opioid Use Documents Filed       Patient Documents       Type of Document Status Date Received Received By Description    Medication Contract Received 6/13/2019  4:27 PM Nikki Olson 6/11/19 medication contract                    Resp 16   Ht 5' 1\" (1.549 m)   Wt 162 lb (73.5 kg)   BMI 30.61 kg/m²      No LMP recorded. Patient has had a hysterectomy.

## 2023-04-24 NOTE — ASSESSMENT & PLAN NOTE
Continue Prevacid Colchicine Counseling:  Patient counseled regarding adverse effects including but not limited to stomach upset (nausea, vomiting, stomach pain, or diarrhea).  Patient instructed to limit alcohol consumption while taking this medication.  Colchicine may reduce blood counts especially with prolonged use.  The patient understands that monitoring of kidney function and blood counts may be required, especially at baseline. The patient verbalized understanding of the proper use and possible adverse effects of colchicine.  All of the patient's questions and concerns were addressed.